# Patient Record
Sex: FEMALE | Race: ASIAN | NOT HISPANIC OR LATINO | Employment: OTHER | URBAN - METROPOLITAN AREA
[De-identification: names, ages, dates, MRNs, and addresses within clinical notes are randomized per-mention and may not be internally consistent; named-entity substitution may affect disease eponyms.]

---

## 2017-05-15 DIAGNOSIS — E78.5 HYPERLIPIDEMIA LDL GOAL <130: ICD-10-CM

## 2017-05-15 NOTE — TELEPHONE ENCOUNTER
lovastatin (MEVACOR) 40 MG tablet     Last Written Prescription Date: 05/11/16  Last Fill Quantity: 90, # refills: 4  Last Office Visit with G, [facility] or [facility] prescribing provider: 05/25/16       Lab Results   Component Value Date    CHOL 216 08/28/2014     Lab Results   Component Value Date    HDL 49 08/28/2014     Lab Results   Component Value Date     05/11/2016     08/28/2014     Lab Results   Component Value Date    TRIG 173 08/28/2014     Lab Results   Component Value Date    CHOLHDLRATIO 4.4 08/28/2014         [facility]

## 2017-05-18 RX ORDER — LOVASTATIN 40 MG
40 TABLET ORAL AT BEDTIME
Qty: 90 TABLET | Refills: 0 | Status: SHIPPED | OUTPATIENT
Start: 2017-05-18 | End: 2018-09-12

## 2017-05-18 NOTE — TELEPHONE ENCOUNTER
Routing refill request to provider for review/approval because:  Labs not current:  Cholesterol labs are from 2014  Lian Hensley RN

## 2017-08-30 ENCOUNTER — COMMUNICATION - HEALTHEAST (OUTPATIENT)
Dept: UROLOGY | Facility: CLINIC | Age: 67
End: 2017-08-30

## 2017-08-30 ENCOUNTER — TRANSFERRED RECORDS (OUTPATIENT)
Dept: HEALTH INFORMATION MANAGEMENT | Facility: CLINIC | Age: 67
End: 2017-08-30

## 2017-08-30 LAB
CREAT SERPL-MCNC: 0.83 MG/DL (ref 0.6–1.1)
GFR SERPL CREATININE-BSD FRML MDRD: >60 ML/MIN/1.73M2
GLUCOSE SERPL-MCNC: 181 MG/DL (ref 70–125)
POTASSIUM SERPL-SCNC: 3.6 MMOL/L (ref 3.5–5)

## 2017-09-05 ENCOUNTER — COMMUNICATION - HEALTHEAST (OUTPATIENT)
Dept: UROLOGY | Facility: CLINIC | Age: 67
End: 2017-09-05

## 2017-09-26 ENCOUNTER — TRANSFERRED RECORDS (OUTPATIENT)
Dept: HEALTH INFORMATION MANAGEMENT | Facility: CLINIC | Age: 67
End: 2017-09-26

## 2017-09-27 ENCOUNTER — COMMUNICATION - HEALTHEAST (OUTPATIENT)
Dept: UROLOGY | Facility: CLINIC | Age: 67
End: 2017-09-27

## 2017-09-29 ENCOUNTER — ANESTHESIA - HEALTHEAST (OUTPATIENT)
Dept: SURGERY | Facility: CLINIC | Age: 67
End: 2017-09-29

## 2017-09-29 ENCOUNTER — SURGERY - HEALTHEAST (OUTPATIENT)
Dept: SURGERY | Facility: CLINIC | Age: 67
End: 2017-09-29

## 2017-09-29 ASSESSMENT — MIFFLIN-ST. JEOR: SCORE: 1220.46

## 2017-10-05 ENCOUNTER — AMBULATORY - HEALTHEAST (OUTPATIENT)
Dept: UROLOGY | Facility: CLINIC | Age: 67
End: 2017-10-05

## 2017-10-05 DIAGNOSIS — N20.9 URINARY TRACT STONES: ICD-10-CM

## 2017-10-10 ENCOUNTER — AMBULATORY - HEALTHEAST (OUTPATIENT)
Dept: UROLOGY | Facility: CLINIC | Age: 67
End: 2017-10-10

## 2017-10-10 ENCOUNTER — TRANSFERRED RECORDS (OUTPATIENT)
Dept: HEALTH INFORMATION MANAGEMENT | Facility: CLINIC | Age: 67
End: 2017-10-10

## 2017-10-10 DIAGNOSIS — N20.0 CALCULUS OF KIDNEY: ICD-10-CM

## 2017-10-10 DIAGNOSIS — N20.1 CALCULUS OF URETER: ICD-10-CM

## 2017-11-13 ENCOUNTER — OFFICE VISIT - HEALTHEAST (OUTPATIENT)
Dept: UROLOGY | Facility: CLINIC | Age: 67
End: 2017-11-13

## 2017-11-13 ENCOUNTER — TRANSFERRED RECORDS (OUTPATIENT)
Dept: HEALTH INFORMATION MANAGEMENT | Facility: CLINIC | Age: 67
End: 2017-11-13

## 2017-11-13 ENCOUNTER — AMBULATORY - HEALTHEAST (OUTPATIENT)
Dept: LAB | Facility: CLINIC | Age: 67
End: 2017-11-13

## 2017-11-13 ENCOUNTER — HOSPITAL ENCOUNTER (OUTPATIENT)
Dept: CT IMAGING | Facility: CLINIC | Age: 67
Discharge: HOME | End: 2017-11-13
Attending: UROLOGY

## 2017-11-13 DIAGNOSIS — N20.1 CALCULUS OF URETER: ICD-10-CM

## 2017-11-13 DIAGNOSIS — N20.9 URINARY CALCULUS: ICD-10-CM

## 2017-11-13 DIAGNOSIS — N20.9 URINARY TRACT STONES: ICD-10-CM

## 2017-11-13 DIAGNOSIS — N20.0 CALCULUS OF KIDNEY: ICD-10-CM

## 2017-11-15 ENCOUNTER — AMBULATORY - HEALTHEAST (OUTPATIENT)
Dept: LAB | Facility: CLINIC | Age: 67
End: 2017-11-15

## 2017-11-15 ENCOUNTER — OFFICE VISIT (OUTPATIENT)
Dept: FAMILY MEDICINE | Facility: CLINIC | Age: 67
End: 2017-11-15
Payer: MEDICARE

## 2017-11-15 VITALS
OXYGEN SATURATION: 98 % | BODY MASS INDEX: 36.31 KG/M2 | WEIGHT: 173 LBS | SYSTOLIC BLOOD PRESSURE: 136 MMHG | TEMPERATURE: 97.7 F | HEART RATE: 78 BPM | HEIGHT: 58 IN | DIASTOLIC BLOOD PRESSURE: 72 MMHG

## 2017-11-15 DIAGNOSIS — Z78.0 ASYMPTOMATIC POSTMENOPAUSAL STATUS: ICD-10-CM

## 2017-11-15 DIAGNOSIS — Z23 NEED FOR PROPHYLACTIC VACCINATION AGAINST STREPTOCOCCUS PNEUMONIAE (PNEUMOCOCCUS): ICD-10-CM

## 2017-11-15 DIAGNOSIS — Z12.31 VISIT FOR SCREENING MAMMOGRAM: ICD-10-CM

## 2017-11-15 DIAGNOSIS — I10 HYPERTENSION GOAL BP (BLOOD PRESSURE) < 140/90: ICD-10-CM

## 2017-11-15 DIAGNOSIS — N20.0 CALCULUS OF KIDNEY: Primary | ICD-10-CM

## 2017-11-15 DIAGNOSIS — Z91.81 AT RISK FOR FALLING: ICD-10-CM

## 2017-11-15 DIAGNOSIS — R20.2 NUMBNESS AND TINGLING OF HAND: ICD-10-CM

## 2017-11-15 DIAGNOSIS — N20.1 CALCULUS OF URETER: ICD-10-CM

## 2017-11-15 DIAGNOSIS — R20.0 NUMBNESS AND TINGLING OF HAND: ICD-10-CM

## 2017-11-15 DIAGNOSIS — E66.01 MORBID OBESITY DUE TO EXCESS CALORIES (H): ICD-10-CM

## 2017-11-15 LAB
ALBUMIN SERPL-MCNC: 3.3 G/DL (ref 3.4–5)
ALP SERPL-CCNC: 94 U/L (ref 40–150)
ALT SERPL W P-5'-P-CCNC: 29 U/L (ref 0–50)
ANION GAP SERPL CALCULATED.3IONS-SCNC: 9 MMOL/L (ref 3–14)
AST SERPL W P-5'-P-CCNC: 20 U/L (ref 0–45)
BILIRUB SERPL-MCNC: 0.3 MG/DL (ref 0.2–1.3)
BUN SERPL-MCNC: 16 MG/DL (ref 7–30)
CALCIUM SERPL-MCNC: 8.4 MG/DL (ref 8.5–10.1)
CHLORIDE SERPL-SCNC: 107 MMOL/L (ref 94–109)
CHOLEST SERPL-MCNC: 195 MG/DL
CO2 SERPL-SCNC: 24 MMOL/L (ref 20–32)
CREAT SERPL-MCNC: 0.87 MG/DL (ref 0.52–1.04)
GFR SERPL CREATININE-BSD FRML MDRD: 65 ML/MIN/1.7M2
GLUCOSE SERPL-MCNC: 148 MG/DL (ref 70–99)
HDLC SERPL-MCNC: 37 MG/DL
LDLC SERPL CALC-MCNC: 83 MG/DL
NONHDLC SERPL-MCNC: 158 MG/DL
POTASSIUM SERPL-SCNC: 3.8 MMOL/L (ref 3.4–5.3)
PROT SERPL-MCNC: 7.1 G/DL (ref 6.8–8.8)
SODIUM SERPL-SCNC: 140 MMOL/L (ref 133–144)
TRIGL SERPL-MCNC: 376 MG/DL
VIT B12 SERPL-MCNC: 420 PG/ML (ref 193–986)

## 2017-11-15 PROCEDURE — 80053 COMPREHEN METABOLIC PANEL: CPT | Performed by: FAMILY MEDICINE

## 2017-11-15 PROCEDURE — 80061 LIPID PANEL: CPT | Performed by: FAMILY MEDICINE

## 2017-11-15 PROCEDURE — 82607 VITAMIN B-12: CPT | Performed by: FAMILY MEDICINE

## 2017-11-15 PROCEDURE — 90670 PCV13 VACCINE IM: CPT | Performed by: FAMILY MEDICINE

## 2017-11-15 PROCEDURE — 99214 OFFICE O/P EST MOD 30 MIN: CPT | Mod: 25 | Performed by: FAMILY MEDICINE

## 2017-11-15 PROCEDURE — 36415 COLL VENOUS BLD VENIPUNCTURE: CPT | Performed by: FAMILY MEDICINE

## 2017-11-15 PROCEDURE — G0009 ADMIN PNEUMOCOCCAL VACCINE: HCPCS | Performed by: FAMILY MEDICINE

## 2017-11-15 RX ORDER — OXYCODONE AND ACETAMINOPHEN 5; 325 MG/1; MG/1
TABLET ORAL
COMMUNITY
Start: 2017-08-30 | End: 2018-09-12

## 2017-11-15 RX ORDER — LOSARTAN POTASSIUM 25 MG/1
25 TABLET ORAL DAILY
Qty: 90 TABLET | Refills: 1 | Status: SHIPPED | OUTPATIENT
Start: 2017-11-15 | End: 2018-09-12

## 2017-11-15 RX ORDER — LISINOPRIL 10 MG/1
10 TABLET ORAL DAILY
COMMUNITY
Start: 2017-09-28 | End: 2017-11-15 | Stop reason: SINTOL

## 2017-11-15 RX ORDER — LEVOFLOXACIN 750 MG/1
TABLET, FILM COATED ORAL
COMMUNITY
Start: 2017-09-28 | End: 2017-11-15

## 2017-11-15 RX ORDER — TAMSULOSIN HYDROCHLORIDE 0.4 MG/1
CAPSULE ORAL
COMMUNITY
Start: 2017-09-28 | End: 2018-09-12

## 2017-11-15 RX ORDER — FUROSEMIDE 20 MG
20 TABLET ORAL DAILY
COMMUNITY
Start: 2017-09-28 | End: 2018-09-12

## 2017-11-15 RX ORDER — OXYCODONE HYDROCHLORIDE 5 MG/1
TABLET ORAL
COMMUNITY
Start: 2017-09-28 | End: 2018-09-12

## 2017-11-15 ASSESSMENT — ANXIETY QUESTIONNAIRES
1. FEELING NERVOUS, ANXIOUS, OR ON EDGE: NOT AT ALL
2. NOT BEING ABLE TO STOP OR CONTROL WORRYING: NOT AT ALL
7. FEELING AFRAID AS IF SOMETHING AWFUL MIGHT HAPPEN: NOT AT ALL
6. BECOMING EASILY ANNOYED OR IRRITABLE: NOT AT ALL
GAD7 TOTAL SCORE: 0
IF YOU CHECKED OFF ANY PROBLEMS ON THIS QUESTIONNAIRE, HOW DIFFICULT HAVE THESE PROBLEMS MADE IT FOR YOU TO DO YOUR WORK, TAKE CARE OF THINGS AT HOME, OR GET ALONG WITH OTHER PEOPLE: NOT DIFFICULT AT ALL
3. WORRYING TOO MUCH ABOUT DIFFERENT THINGS: NOT AT ALL
5. BEING SO RESTLESS THAT IT IS HARD TO SIT STILL: NOT AT ALL

## 2017-11-15 ASSESSMENT — PATIENT HEALTH QUESTIONNAIRE - PHQ9
SUM OF ALL RESPONSES TO PHQ QUESTIONS 1-9: 1
5. POOR APPETITE OR OVEREATING: NOT AT ALL

## 2017-11-15 NOTE — PATIENT INSTRUCTIONS
Based on your medical history and these are the current health maintenance or preventive care services that you are due for (some may have been done at this visit)  Health Maintenance Due   Topic Date Due     PHQ-9 Q6 MONTHS  11/25/2016     FALL RISK ASSESSMENT  04/06/2017     DEXA SCAN SCREENING (SYSTEM ASSIGNED)  04/06/2017     PNEUMOCOCCAL (1 of 2 - PCV13) 04/06/2017     LIPID MONITORING Q1 YEAR  05/11/2017     DEPRESSION ACTION PLAN Q1 YR  05/11/2017     FIT Q1 YR  05/25/2017     MAMMO SCREEN Q2 YR (SYSTEM ASSIGNED)  07/22/2017     INFLUENZA VACCINE (SYSTEM ASSIGNED)  09/01/2017         At [facility], we strive to deliver an exceptional experience to you, every time we see you.    If you receive a survey in the mail, please send us back your thoughts. We really do value your feedback.    Your care team's suggested websites for health information:  Www.[employer] : Up to date and easily searchable information on multiple topics.  Www.medlineplus.gov : medication info, interactive tutorials, watch real surgeries online  Www.familydoctor.org : good info from the Academy of Family Physicians  Www.cdc.gov : public health info, travel advisories, epidemics (H1N1)  Www.aap.org : children's health info, normal development, vaccinations  Www.health.[facility].[place].us : [place] dept of health, public health issues in [place], N1N1    How to contact your care team:   Team [facility] [phone]         Pharmacy [phone]    Dr. Dickinson, Mariluz Geronimo PA-C, Dr. Woodall, Chelsea TERESA CNP, Bria Lange PA-C, Dr. Bell, and BRII Bangura CNP    Team RN: Lyric      Clinic hours  M-Th 7 am-7 pm   Fri 7 am-5 pm.   Urgent care M-F 11 am-9 pm,   Sat/Sun 9 am-5 pm.  Pharmacy M-Th 8 am-8 pm Fri 8 am-6 pm  Sat/Sun 9 am-5 pm.     All password changes, disabled accounts, or ID changes in [employer]MyHealth will be done by our Access Services Department.    If you need help with your account or  sofi, call: [phone]. Clinic staff no longer has the ability to change passwords.

## 2017-11-15 NOTE — NURSING NOTE
"Chief Complaint   Patient presents with     [facility] - 9/26/2017 to 9/28/2017 - Returning to hospital on 11/13/2017 = Calculus of ureter (Primary Dx);Urinary tract stones;Urinary calculus       Initial /72 (BP Location: Right arm, Patient Position: Chair, Cuff Size: Adult Large)  Pulse 78  Temp 97.7  F (36.5  C) (Oral)  Ht 4' 10\" (1.473 m)  Wt 173 lb (78.5 kg)  SpO2 98%  Breastfeeding? No  BMI 36.16 kg/m2 Estimated body mass index is 36.16 kg/(m^2) as calculated from the following:    Height as of this encounter: 4' 10\" (1.473 m).    Weight as of this encounter: 173 lb (78.5 kg).  Medication Reconciliation: complete   An,CMA (AMAA)      "

## 2017-11-15 NOTE — LETTER
November 16, 2017      Amalia Harris  [address] [facility] [place] [address]            Ms. Harris,    Your LDL (bad cholesterol)  was at goal. Your HDL (good cholesterol) was below my goal for you (>45 in men and > 55 in women).  Genetics and inactivity can contribute to this. Your triglycerides were above normal.  Poor diet, genetics and being overweight can contribute to this.  1000mg daily of omega-3 fatty acids may improve this. You need to recheck fasting labs yearly.  Your liver function tests are normal.  Your blood sugar was elevated but this is not unexpected given you were not fasting. Maintaining a healthy weight is benificial to good blood sugar control.  Your kidney function was normal.  Your vitamin B12 level was normal.    Please contact the clinic if you have additional questions.  Thank you.    Sincerely,    Lien Link/ak        Results for orders placed or performed in visit on 11/15/17   Lipid panel reflex to direct LDL Non-fasting   Result Value Ref Range    Cholesterol 195 <200 mg/dL    Triglycerides 376 (H) <150 mg/dL    HDL Cholesterol 37 (L) >49 mg/dL    LDL Cholesterol Calculated 83 <100 mg/dL    Non HDL Cholesterol 158 (H) <130 mg/dL   Comprehensive metabolic panel   Result Value Ref Range    Sodium 140 133 - 144 mmol/L    Potassium 3.8 3.4 - 5.3 mmol/L    Chloride 107 94 - 109 mmol/L    Carbon Dioxide 24 20 - 32 mmol/L    Anion Gap 9 3 - 14 mmol/L    Glucose 148 (H) 70 - 99 mg/dL    Urea Nitrogen 16 7 - 30 mg/dL    Creatinine 0.87 0.52 - 1.04 mg/dL    GFR Estimate 65 >60 mL/min/1.7m2    GFR Estimate If Black 79 >60 mL/min/1.7m2    Calcium 8.4 (L) 8.5 - 10.1 mg/dL    Bilirubin Total 0.3 0.2 - 1.3 mg/dL    Albumin 3.3 (L) 3.4 - 5.0 g/dL    Protein Total 7.1 6.8 - 8.8 g/dL    Alkaline Phosphatase 94 40 - 150 U/L    ALT 29 0 - 50 U/L    AST 20 0 - 45 U/L   Vitamin B12   Result Value Ref Range    Vitamin B12 420 193 - 986 pg/mL

## 2017-11-15 NOTE — PROGRESS NOTES
SUBJECTIVE:   Amalia Harris is a 67 year old female who presents to clinic today for the following health issues:          Hospital Follow-up Visit:    Hospital/Nursing Home/IP Rehab Facility: [facility]  Date of Admission: 9/26/17  Date of Discharge: 9/28/2017  Reason(s) for Admission:    Diagnoses    Hydronephrosis    Renal colic on right side    Obstruction of left ureteropelvic junction (UPJ) due to stone                  Problems taking medications regularly:  None       Medication changes since discharge: None       Problems adhering to non-medication therapy:  None    Summary of hospitalization:  [facility] discharge summary reviewed  Diagnostic Tests/Treatments reviewed.  Follow up needed: none  Other Healthcare Providers Involved in Patient s Care:         None  Update since discharge: improved.     Post Discharge Medication Reconciliation: discharge medications reconciled and changed, per note/orders (see AVS).  Plan of care communicated with patient     Coding guidelines for this visit:  Type of Medical   Decision Making Face-to-Face Visit       within 7 Days of discharge Face-to-Face Visit        within 14 days of discharge   Moderate Complexity [id number]   High Complexity [id number]          Left stone passed with flomax but right stone required lithotripsy. Has one still present and should pass on its own with flomax.    HTN - taking lisinopril but causing dry cough that is disrupting day.  Requesting change.    Tingling of bilateral hands for months.  Worse at night and better when shaking hands.  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          Problem list and histories reviewed & adjusted, as indicated.  Additional history: as documented    Patient Active Problem List   Diagnosis     Hyperlipidemia LDL goal <130     Major depression     Osteoarthritis     Advance care planning     Intertriginous candidiasis     Morbid obesity considered morbid due to HTN (H)      "Prediabetes     Guaiac positive stools     Past Surgical History:   Procedure Laterality Date     GALLBLADDER SURGERY      was done in 86       Social History   Substance Use Topics     Smoking status: Never Smoker     Smokeless tobacco: Never Used     Alcohol use No     Family History   Problem Relation Age of Onset     Respiratory Father      CANCER Brother              Reviewed and updated as needed this visit by clinical staffTobacco  Allergies  Meds  Problems  Med Hx  Surg Hx  Fam Hx  Soc Hx        Reviewed and updated as needed this visit by Provider  Allergies  Meds  Problems         ROS:  Constitutional, HEENT, cardiovascular, pulmonary, gi and gu systems are negative, except as otherwise noted.      OBJECTIVE:   /72 (BP Location: Right arm, Patient Position: Chair, Cuff Size: Adult Large)  Pulse 78  Temp 97.7  F (36.5  C) (Oral)  Ht 4' 10\" (1.473 m)  Wt 173 lb (78.5 kg)  SpO2 98%  Breastfeeding? No  BMI 36.16 kg/m2  Body mass index is 36.16 kg/(m^2).  GENERAL: healthy, alert and no distress  NECK: no adenopathy, no asymmetry, masses, or scars and thyroid normal to palpation  RESP: lungs clear to auscultation - no rales, rhonchi or wheezes  CV: regular rate and rhythm, normal S1 S2, no S3 or S4, no murmur, click or rub, no peripheral edema and peripheral pulses strong  ABDOMEN: soft, nontender, no hepatosplenomegaly, no masses and bowel sounds normal, no flank pain  MS: no gross musculoskeletal defects noted, no edema  NEURO: tingling when tapping cubital tendon on LEFT  PSYCH: mentation appears normal, affect normal/bright    Diagnostic Test Results:  none     ASSESSMENT/PLAN:     1. Calculus of kidney  Continued stone - check labs and continue flomax until last stone has passed  - Comprehensive metabolic panel    2. Hypertension goal BP (blood pressure) < 140/90  Medication side effects - stop lisinopril and start losartan. Check labs  - Lipid panel reflex to direct LDL " Non-fasting  - losartan (COZAAR) 25 MG tablet; Take 1 tablet (25 mg) by mouth daily  Dispense: 90 tablet; Refill: 1  - Comprehensive metabolic panel    3. Numbness and tingling of hand  Suspect carpal tunnel - check lab and refer for EMG  - NEUROLOGY ADULT REFERRAL  - Vitamin B12    4. Morbid obesity considered morbid due to HTN (H)  Low carb eating recommended    5. Need for prophylactic vaccination against Streptococcus pneumoniae (pneumococcus)    - PNEUMOCOCCAL CONJ VACCINE 13 VALENT IM  - ADMIN 1st VACCINE    6. At risk for falling  MA assessment done    7. Visit for screening mammogram    - MA SCREENING DIGITAL BILAT - Future  (s+30); Future    8. Asymptomatic postmenopausal status    - DEXA HIP/PELVIS/SPINE - Future; Future    The uses and side effects, including black box warnings as appropriate, were discussed in detail.  All patient questions were answered.  The patient was instructed to call immediately if any side effects developed.     FUTURE APPOINTMENTS:       - Follow-up visit in 1 month.    Lien Link MD  [facility]

## 2017-11-15 NOTE — NURSING NOTE
Screening Questionnaire for Adult Immunization    Are you sick today?   No   Do you have allergies to medications, food, a vaccine component or latex?   Yes   Have you ever had a serious reaction after receiving a vaccination?   No   Do you have a long-term health problem with heart disease, lung disease, asthma, kidney disease, metabolic disease (e.g. diabetes), anemia, or other blood disorder?   No   Do you have cancer, leukemia, HIV/AIDS, or any other immune system problem?   No   In the past 3 months, have you taken medications that affect  your immune system, such as prednisone, other steroids, or anticancer drugs; drugs for the treatment of rheumatoid arthritis, Crohn s disease, or psoriasis; or have you had radiation treatments?   No   Have you had a seizure, or a brain or other nervous system problem?   No   During the past year, have you received a transfusion of blood or blood     products, or been given immune (gamma) globulin or antiviral drug?   No   For women: Are you pregnant or is there a chance you could become        pregnant during the next month?   No   Have you received any vaccinations in the past 4 weeks?   No     Immunization questionnaire answers were all negative.        Per orders of Dr. Angela Link, injection of PCV 13 given by Orin Scruggs. Patient instructed to remain in clinic for 15 minutes afterwards, and to report any adverse reaction to me immediately.       Screening performed by Orin Scruggs on 11/15/2017 at 4:02 PM.

## 2017-11-16 ASSESSMENT — ANXIETY QUESTIONNAIRES: GAD7 TOTAL SCORE: 0

## 2017-11-16 NOTE — PROGRESS NOTES
Ms. Harris,    Your LDL (bad cholesterol)  was at goal. Your HDL (good cholesterol) was below my goal for you (>45 in men and > 55 in women).  Genetics and inactivity can contribute to this. Your triglycerides were above normal.  Poor diet, genetics and being overweight can contribute to this.  1000mg daily of omega-3 fatty acids may improve this. You need to recheck fasting labs yearly.  Your liver function tests are normal.  Your blood sugar was elevated but this is not unexpected given you were not fasting. Maintaining a healthy weight is benificial to good blood sugar control.  Your kidney function was normal.  Your vitamin B12 level was normal.    Please contact the clinic if you have additional questions.  Thank you.    Sincerely,    Lien Link

## 2017-11-22 ENCOUNTER — COMMUNICATION - HEALTHEAST (OUTPATIENT)
Dept: UROLOGY | Facility: CLINIC | Age: 67
End: 2017-11-22

## 2017-11-22 ENCOUNTER — AMBULATORY - HEALTHEAST (OUTPATIENT)
Dept: LAB | Facility: CLINIC | Age: 67
End: 2017-11-22

## 2017-11-22 DIAGNOSIS — N20.9 URINARY TRACT STONES: ICD-10-CM

## 2017-12-14 ENCOUNTER — HOSPITAL ENCOUNTER (OUTPATIENT)
Dept: CT IMAGING | Facility: CLINIC | Age: 67
Discharge: HOME | End: 2017-12-14
Attending: UROLOGY

## 2017-12-14 ENCOUNTER — OFFICE VISIT - HEALTHEAST (OUTPATIENT)
Dept: UROLOGY | Facility: CLINIC | Age: 67
End: 2017-12-14

## 2017-12-14 ENCOUNTER — TRANSFERRED RECORDS (OUTPATIENT)
Dept: HEALTH INFORMATION MANAGEMENT | Facility: CLINIC | Age: 67
End: 2017-12-14

## 2017-12-14 DIAGNOSIS — N20.1 CALCULUS OF URETER: ICD-10-CM

## 2017-12-14 DIAGNOSIS — R34 LOW URINE OUTPUT: ICD-10-CM

## 2017-12-14 DIAGNOSIS — N20.9 URINARY TRACT STONES: ICD-10-CM

## 2017-12-14 DIAGNOSIS — N20.0 URIC ACID NEPHROLITHIASIS: ICD-10-CM

## 2017-12-14 DIAGNOSIS — R82.991 HYPOCITRATURIA: ICD-10-CM

## 2018-09-12 ENCOUNTER — OFFICE VISIT (OUTPATIENT)
Dept: FAMILY MEDICINE | Facility: CLINIC | Age: 68
End: 2018-09-12
Payer: MEDICARE

## 2018-09-12 VITALS
OXYGEN SATURATION: 98 % | BODY MASS INDEX: 38.12 KG/M2 | HEIGHT: 58 IN | HEART RATE: 88 BPM | WEIGHT: 181.6 LBS | DIASTOLIC BLOOD PRESSURE: 80 MMHG | SYSTOLIC BLOOD PRESSURE: 116 MMHG | TEMPERATURE: 98 F

## 2018-09-12 DIAGNOSIS — F32.2 SEVERE SINGLE CURRENT EPISODE OF MAJOR DEPRESSIVE DISORDER, WITHOUT PSYCHOTIC FEATURES (H): ICD-10-CM

## 2018-09-12 DIAGNOSIS — I10 HYPERTENSION GOAL BP (BLOOD PRESSURE) < 140/90: ICD-10-CM

## 2018-09-12 DIAGNOSIS — Z11.1 SCREENING EXAMINATION FOR PULMONARY TUBERCULOSIS: ICD-10-CM

## 2018-09-12 DIAGNOSIS — Z23 NEED FOR PROPHYLACTIC VACCINATION AND INOCULATION AGAINST INFLUENZA: ICD-10-CM

## 2018-09-12 DIAGNOSIS — E78.5 HYPERLIPIDEMIA LDL GOAL <130: ICD-10-CM

## 2018-09-12 DIAGNOSIS — R07.9 CHEST PAIN, UNSPECIFIED TYPE: Primary | ICD-10-CM

## 2018-09-12 DIAGNOSIS — E66.01 MORBID OBESITY DUE TO EXCESS CALORIES (H): ICD-10-CM

## 2018-09-12 DIAGNOSIS — R01.1 HEART MURMUR: ICD-10-CM

## 2018-09-12 DIAGNOSIS — Z12.11 SCREEN FOR COLON CANCER: ICD-10-CM

## 2018-09-12 LAB
ALBUMIN SERPL-MCNC: 3.2 G/DL (ref 3.4–5)
ALP SERPL-CCNC: 78 U/L (ref 40–150)
ALT SERPL W P-5'-P-CCNC: 26 U/L (ref 0–50)
ANION GAP SERPL CALCULATED.3IONS-SCNC: 8 MMOL/L (ref 3–14)
AST SERPL W P-5'-P-CCNC: 16 U/L (ref 0–45)
BILIRUB SERPL-MCNC: 0.3 MG/DL (ref 0.2–1.3)
BUN SERPL-MCNC: 25 MG/DL (ref 7–30)
CALCIUM SERPL-MCNC: 9.1 MG/DL (ref 8.5–10.1)
CHLORIDE SERPL-SCNC: 105 MMOL/L (ref 94–109)
CHOLEST SERPL-MCNC: 250 MG/DL
CO2 SERPL-SCNC: 28 MMOL/L (ref 20–32)
CREAT SERPL-MCNC: 0.85 MG/DL (ref 0.52–1.04)
ERYTHROCYTE [DISTWIDTH] IN BLOOD BY AUTOMATED COUNT: 13.2 % (ref 10–15)
GFR SERPL CREATININE-BSD FRML MDRD: 67 ML/MIN/1.7M2
GLUCOSE SERPL-MCNC: 118 MG/DL (ref 70–99)
HCT VFR BLD AUTO: 38.1 % (ref 35–47)
HDLC SERPL-MCNC: 65 MG/DL
HGB BLD-MCNC: 12.3 G/DL (ref 11.7–15.7)
LDLC SERPL CALC-MCNC: 128 MG/DL
MCH RBC QN AUTO: 29.9 PG (ref 26.5–33)
MCHC RBC AUTO-ENTMCNC: 32.3 G/DL (ref 31.5–36.5)
MCV RBC AUTO: 93 FL (ref 78–100)
NONHDLC SERPL-MCNC: 185 MG/DL
PLATELET # BLD AUTO: 354 10E9/L (ref 150–450)
POTASSIUM SERPL-SCNC: 4.3 MMOL/L (ref 3.4–5.3)
PROT SERPL-MCNC: 7.7 G/DL (ref 6.8–8.8)
RBC # BLD AUTO: 4.11 10E12/L (ref 3.8–5.2)
SODIUM SERPL-SCNC: 141 MMOL/L (ref 133–144)
TRIGL SERPL-MCNC: 286 MG/DL
TSH SERPL DL<=0.005 MIU/L-ACNC: 2.09 MU/L (ref 0.4–4)
WBC # BLD AUTO: 14 10E9/L (ref 4–11)

## 2018-09-12 PROCEDURE — G0008 ADMIN INFLUENZA VIRUS VAC: HCPCS | Performed by: NURSE PRACTITIONER

## 2018-09-12 PROCEDURE — 80053 COMPREHEN METABOLIC PANEL: CPT | Performed by: NURSE PRACTITIONER

## 2018-09-12 PROCEDURE — 90662 IIV NO PRSV INCREASED AG IM: CPT | Performed by: NURSE PRACTITIONER

## 2018-09-12 PROCEDURE — 85027 COMPLETE CBC AUTOMATED: CPT | Performed by: NURSE PRACTITIONER

## 2018-09-12 PROCEDURE — 86480 TB TEST CELL IMMUN MEASURE: CPT | Performed by: NURSE PRACTITIONER

## 2018-09-12 PROCEDURE — 82043 UR ALBUMIN QUANTITATIVE: CPT | Performed by: NURSE PRACTITIONER

## 2018-09-12 PROCEDURE — 80061 LIPID PANEL: CPT | Performed by: NURSE PRACTITIONER

## 2018-09-12 PROCEDURE — 93000 ELECTROCARDIOGRAM COMPLETE: CPT | Performed by: NURSE PRACTITIONER

## 2018-09-12 PROCEDURE — 99214 OFFICE O/P EST MOD 30 MIN: CPT | Mod: 25 | Performed by: NURSE PRACTITIONER

## 2018-09-12 PROCEDURE — 36415 COLL VENOUS BLD VENIPUNCTURE: CPT | Performed by: NURSE PRACTITIONER

## 2018-09-12 PROCEDURE — 84443 ASSAY THYROID STIM HORMONE: CPT | Performed by: NURSE PRACTITIONER

## 2018-09-12 RX ORDER — LOVASTATIN 40 MG
40 TABLET ORAL AT BEDTIME
Qty: 90 TABLET | Refills: 1 | Status: SHIPPED | OUTPATIENT
Start: 2018-09-12 | End: 2019-09-05

## 2018-09-12 ASSESSMENT — ANXIETY QUESTIONNAIRES
5. BEING SO RESTLESS THAT IT IS HARD TO SIT STILL: NEARLY EVERY DAY
7. FEELING AFRAID AS IF SOMETHING AWFUL MIGHT HAPPEN: NOT AT ALL
2. NOT BEING ABLE TO STOP OR CONTROL WORRYING: NOT AT ALL
IF YOU CHECKED OFF ANY PROBLEMS ON THIS QUESTIONNAIRE, HOW DIFFICULT HAVE THESE PROBLEMS MADE IT FOR YOU TO DO YOUR WORK, TAKE CARE OF THINGS AT HOME, OR GET ALONG WITH OTHER PEOPLE: SOMEWHAT DIFFICULT
6. BECOMING EASILY ANNOYED OR IRRITABLE: NEARLY EVERY DAY
1. FEELING NERVOUS, ANXIOUS, OR ON EDGE: NEARLY EVERY DAY
3. WORRYING TOO MUCH ABOUT DIFFERENT THINGS: NEARLY EVERY DAY
GAD7 TOTAL SCORE: 12

## 2018-09-12 ASSESSMENT — PATIENT HEALTH QUESTIONNAIRE - PHQ9: 5. POOR APPETITE OR OVEREATING: NOT AT ALL

## 2018-09-12 NOTE — PROGRESS NOTES
SUBJECTIVE:   Amalia Harris is a 68 year old female who presents to clinic today for the following health issues:    Hyperlipidemia Follow-Up      Rate your low fat/cholesterol diet?: good    Taking statin?  Yes, no muscle aches from statin    Other lipid medications/supplements?:  none      Amount of exercise or physical activity: 4-5 days/week for an average of greater than 60 minutes    Problems taking medications regularly: No    Medication side effects: weight gain    Diet: low fat/cholesterol    Possible medication to help lose weight.          Chest Pain      Onset: 2 weeks    Description (location/character/radiation/duration): cones and goes, lasting for a few hours at a time, not related to meals, sometimes is worse with activity (climbing stairs), no shortness of breath but she notes increased HR, no diaphoresis, weakness, dizziness, nausea or voniting.     Intensity:  moderate    Accompanying signs and symptoms:        Shortness of breath: no        Sweating: no        Nausea/vomitting: no        Palpitations: YES- when walking quickly       Other (fevers/chills/cough/heartburn/lightheadedness): no     History (similar episodes/previous evaluation): None    Precipitating or alleviating factors:       Worse with exertion: YES- especially when carrying heavy items       Worse with breathing: no        Related to eating: no        Better with burping: no     Therapies tried and outcome: Warm water/green tea helps    Patient also requests TB screen.  She went camping with a friend who has TB..    No unexplained hoarseness  No loss of appetite  No unexplained weight loss  No productive or prolonged cough  No bloody sputum  Np persistent fever over 100 F  No night sweats  No hemoptysis  No shortness of breath  No unexplained fatigue or weakness  No recurrent pneumonia      Problem list and histories reviewed & adjusted, as indicated.  Additional history: as documented    Patient Active Problem List   Diagnosis  "    Hyperlipidemia LDL goal <130     Major depression     Osteoarthritis     Advance care planning     Intertriginous candidiasis     Morbid obesity considered morbid due to HTN (H)     Prediabetes     Guaiac positive stools     Hypertension goal BP (blood pressure) < 140/90     Past Surgical History:   Procedure Laterality Date     GALLBLADDER SURGERY      was done in 86       Social History   Substance Use Topics     Smoking status: Never Smoker     Smokeless tobacco: Never Used     Alcohol use No     Family History   Problem Relation Age of Onset     Respiratory Father      Cancer Brother          Current Outpatient Prescriptions   Medication Sig Dispense Refill     lovastatin (MEVACOR) 40 MG tablet Take 1 tablet (40 mg) by mouth At Bedtime Needs to be seen for more. 90 tablet 0     BP Readings from Last 3 Encounters:   09/12/18 116/80   11/15/17 136/72   05/25/16 126/82    Wt Readings from Last 3 Encounters:   09/12/18 181 lb 9.6 oz (82.4 kg)   11/15/17 173 lb (78.5 kg)   05/25/16 176 lb 9.6 oz (80.1 kg)                    Reviewed and updated as needed this visit by clinical staff  Tobacco  Allergies  Meds  Med Hx  Surg Hx  Fam Hx  Soc Hx      Reviewed and updated as needed this visit by Provider         ROS:  Constitutional, HEENT, cardiovascular, pulmonary, gi and gu systems are negative, except as otherwise noted.    OBJECTIVE:     /80 (BP Location: Left arm, Patient Position: Chair, Cuff Size: Adult Large)  Pulse 88  Temp 98  F (36.7  C) (Oral)  Ht 4' 10\" (1.473 m)  Wt 181 lb 9.6 oz (82.4 kg)  SpO2 98%  BMI 37.95 kg/m2  Body mass index is 37.95 kg/(m^2).  GENERAL: healthy, alert and no distress  EYES: Eyes grossly normal to inspection, PERRL and conjunctivae and sclerae normal  HENT: ear canals and TM's normal, nose and mouth without ulcers or lesions  NECK: no adenopathy, no asymmetry, masses, or scars and thyroid normal to palpation  RESP: lungs clear to auscultation - no rales, rhonchi " "or wheezes  CV: regular rate and rhythm, normal S1 S2, no S3 or S4, Gr 2 DHIRAJ heard best at LUSB, no click or rub, no peripheral edema and peripheral pulses strong  ABDOMEN: soft, nontender, no hepatosplenomegaly, no masses and bowel sounds normal  MS: no gross musculoskeletal defects noted, no edema  SKIN: no suspicious lesions or rashes  NEURO: Normal strength and tone, mentation intact and speech normal  BACK: no CVA tenderness, no paralumbar tenderness  PSYCH: mentation appears normal, affect normal/bright  LYMPH: normal ant/post cervical, supraclavicular nodes    Diagnostic Test Results:  Results for orders placed or performed in visit on 09/12/18 (from the past 24 hour(s))   EKG 12-lead complete w/read - Clinics    Impression    Sinus rhythm, rate 74,  Normal rate, rhythm, intervals, axis, nonspecific ST depression-nondiagnostic, no prior EKG's available for comparison.  Abnormal EKG.  Felicia Ayala  APRN, CNP     CBC with platelets   Result Value Ref Range    WBC 14.0 (H) 4.0 - 11.0 10e9/L    RBC Count 4.11 3.8 - 5.2 10e12/L    Hemoglobin 12.3 11.7 - 15.7 g/dL    Hematocrit 38.1 35.0 - 47.0 %    MCV 93 78 - 100 fl    MCH 29.9 26.5 - 33.0 pg    MCHC 32.3 31.5 - 36.5 g/dL    RDW 13.2 10.0 - 15.0 %    Platelet Count 354 150 - 450 10e9/L       ASSESSMENT/PLAN:         BMI:   Estimated body mass index is 37.95 kg/(m^2) as calculated from the following:    Height as of this encounter: 4' 10\" (1.473 m).    Weight as of this encounter: 181 lb 9.6 oz (82.4 kg).   Weight management plan: Discussed healthy diet and exercise guidelines and patient will follow up in 6 months in clinic to re-evaluate.      1. Chest pain, unspecified type  Patient is asymptomatic presently, EKG with nonspecifid ST depression.  Getting chest film given history of CHF, new onset murmur, referring to Cardiology, reviewed indications for ER visit in detail.  - EKG 12-lead complete w/read - Clinics  - Dobutamine Stress Echocardiogram; " Future  - XR Chest 2 Views; Future  - CARDIOLOGY EVAL ADULT REFERRAL    2. Heart murmur  Getting stress echo, chest film, and referring to Cardiology. Patient to go to the ER for new/worsening chest pain.  - CBC with platelets; Future  - Dobutamine Stress Echocardiogram; Future  - XR Chest 2 Views; Future  - CBC with platelets  - CARDIOLOGY EVAL ADULT REFERRAL    3. Severe single current episode of major depressive disorder, without psychotic features (H)  PHQ-9= 15, ILAN-78=12.  Patient is not on serotonin specific reuptake inhibitor and is not in counseling, declines medication/ referral at this time.     4. Hyperlipidemia LDL goal <130  Patient will come back in 2 weeks for fasting labs, refilled Mevacor. I will call with any abnormal lab results.  Low salt, low chol diet reviewed.  - Lipid Profile (Chol, Trig, HDL, LDL calc); Future  - Comprehensive metabolic panel FUTURE anytime; Future  - Lipid Profile (Chol, Trig, HDL, LDL calc)  - lovastatin (MEVACOR) 40 MG tablet; Take 1 tablet (40 mg) by mouth At Bedtime Needs to be seen for more.  Dispense: 90 tablet; Refill: 1    5. Hypertension goal BP (blood pressure) < 140/90  checking labs, low salt diet advised.  - **Comprehensive metabolic panel FUTURE anytime; Future  - TSH with free T4 reflex; Future  - Albumin Random Urine Quantitative with Creat Ratio; Future  - Albumin Random Urine Quantitative with Creat Ratio  - TSH with free T4 reflex  - **Comprehensive metabolic panel FUTURE anytime    6. Morbid obesity considered morbid due to HTN (H)  Benefits of weight loss reviewed in detail, encouraged her to cut back on the carbohydrates in the diet, consume more fruits and vegetables, drink plenty of water, avoid fruit juices, sodas, get 150 min moderate exercise/week.  Recheck weight in 6 months.    7. Screen for colon cancer  -FIT screening    8. Screening examination for pulmonary tuberculosis    - M Tuberculosis by Quantiferon    9. Need for prophylactic  vaccination and inoculation against influenza    - FLU VACCINE, INCREASED ANTIGEN, PRESV FREE, AGE 65+ [25390]  - Vaccine Administration, Initial [48233]    See Patient Instructions    BRII Barrientos [facility]

## 2018-09-12 NOTE — PATIENT INSTRUCTIONS
At [facility], we strive to deliver an exceptional experience to you, every time we see you.  If you receive a survey in the mail, please send us back your thoughts. We really do value your feedback.    Based on your medical history, these are the current health maintenance/preventive care services that you are due for (some may have been done at this visit.)  Health Maintenance Due   Topic Date Due     FALL RISK ASSESSMENT  04/06/2017     DEXA SCAN SCREENING (SYSTEM ASSIGNED)  04/06/2017     DEPRESSION ACTION PLAN Q1 YR  05/11/2017     FIT Q1 YR  05/25/2017     MAMMO SCREEN Q2 YR (SYSTEM ASSIGNED)  07/22/2017     PHQ-9 Q6 MONTHS  05/15/2018     INFLUENZA VACCINE (1) 09/01/2018       Suggested websites for health information:  Www.Shoes4you.GreenPal : Up to date and easily searchable information on multiple topics.  Www.33Across.gov : medication info, interactive tutorials, watch real surgeries online  Www.familydoctor.org : good info from the Academy of Family Physicians  Www.cdc.gov : public health info, travel advisories, epidemics (H1N1)  Www.aap.org : children's health info, normal development, vaccinations  Www.health.[facility].[place].us : [place] dept of health, public health issues in [place], N1N1    Your care team:                            Family Medicine Internal Medicine   MD Gonzalo Palomino MD Shantel Branch-Fleming, MD Katya Georgiev PA-C Megan Hill, APRN AGUEDA Perrin MD Pediatrics   Isaiah Garcia, PASajanC  Isabel George, MD Chelsea Garcia APRN CNP   MD Kiersten Michele MD Deborah Mielke, MD Kim Thein, APRN [facility]      Clinic hours: Monday - Thursday 7 am-7 pm; Fridays 7 am-5 pm.   Urgent care: Monday - Friday 11 am-9 pm; Saturday and Sunday 9 am-5 pm.  Pharmacy : Monday -Thursday 8 am-8 pm; Friday 8 am-6 pm; Saturday and Sunday 9 am-5 pm.     Clinic: [phone]   Pharmacy: [phone]       *CHEST PAIN, UNCERTAIN CAUSE    Based on your  exam today, the exact cause of your chest pain is not certain. Your condition does not seem serious at this time, and your pain does not appear to be coming from your heart. However, sometimes the signs of a serious problem take more time to appear. Therefore, watch for the warning signs listed below.  HOME CARE:    1. Rest today and avoid strenuous activity.  2. Take any prescribed medicine as directed.  FOLLOW UP with your doctor in 1-3 days.   GET PROMPT MEDICAL ATTENTION if any of the following occur:    A change in the type of pain: if it feels different, becomes more severe, lasts longer, or begins to spread into your shoulder, arm, neck, jaw or back    Shortness of breath or increased pain with breathing    Weakness, dizziness, or fainting    Cough with blood or dark colored sputum (phlegm)    Fever over 101  F (38.3  C)    Swelling, pain or redness in one leg    6812-3123 [employer]. [address] [place] [address]. All rights reserved. This information is not intended as a substitute for professional medical care. Always follow your healthcare professional's instructions.  This information has been modified by your health care provider with permission from the publisher.

## 2018-09-12 NOTE — MR AVS SNAPSHOT
After Visit Summary   9/12/2018    Amalia Harris    MRN: [id number]           Patient Information     Date Of Birth          1950        Visit Information        Provider Department      9/12/2018 3:00 PM Felicia Ayala APRN CNP; [employer] [employer] [facility]        Today's Diagnoses     Chest pain, unspecified type    -  1    Heart murmur        Hyperlipidemia LDL goal <130        Hypertension goal BP (blood pressure) < 140/90        Morbid obesity considered morbid due to HTN (H)        Screen for colon cancer        Major depressive disorder with single episode, in full remission (H)        Influenza vaccine needed          Care Instructions    At [facility], we strive to deliver an exceptional experience to you, every time we see you.  If you receive a survey in the mail, please send us back your thoughts. We really do value your feedback.    Based on your medical history, these are the current health maintenance/preventive care services that you are due for (some may have been done at this visit.)  Health Maintenance Due   Topic Date Due     FALL RISK ASSESSMENT  04/06/2017     DEXA SCAN SCREENING (SYSTEM ASSIGNED)  04/06/2017     DEPRESSION ACTION PLAN Q1 YR  05/11/2017     FIT Q1 YR  05/25/2017     MAMMO SCREEN Q2 YR (SYSTEM ASSIGNED)  07/22/2017     PHQ-9 Q6 MONTHS  05/15/2018     INFLUENZA VACCINE (1) 09/01/2018       Suggested websites for health information:  Www.MacroSolve.org : Up to date and easily searchable information on multiple topics.  Www.medlineplus.gov : medication info, interactive tutorials, watch real surgeries online  Www.familydoctor.org : good info from the Academy of Family Physicians  Www.cdc.gov : public health info, travel advisories, epidemics (H1N1)  Www.aap.org : children's health info, normal development, vaccinations  Www.health.state.[place].us : [place] dept of health, public health issues in [place], N1N1    Your care team:                             Family Medicine Internal Medicine   MD Gonzalo Palomino MD Shantel Branch-Fleming, MD Katya Georgiev PA-C Megan Hill, APRN AGUEDA Perrin MD Pediatrics   Isaiah Garcia PAMERI George, MD Chelsea Garcia APRN CNP   MD Kiersten Michele MD Deborah Mielke, MD Kim Thein, APRN [facility]      Clinic hours: Monday - Thursday 7 am-7 pm; Fridays 7 am-5 pm.   Urgent care: Monday - Friday 11 am-9 pm; Saturday and Sunday 9 am-5 pm.  Pharmacy : Monday -Thursday 8 am-8 pm; Friday 8 am-6 pm; Saturday and Sunday 9 am-5 pm.     Clinic: [phone]   Pharmacy: [phone]       *CHEST PAIN, UNCERTAIN CAUSE    Based on your exam today, the exact cause of your chest pain is not certain. Your condition does not seem serious at this time, and your pain does not appear to be coming from your heart. However, sometimes the signs of a serious problem take more time to appear. Therefore, watch for the warning signs listed below.  HOME CARE:    1. Rest today and avoid strenuous activity.  2. Take any prescribed medicine as directed.  FOLLOW UP with your doctor in 1-3 days.   GET PROMPT MEDICAL ATTENTION if any of the following occur:    A change in the type of pain: if it feels different, becomes more severe, lasts longer, or begins to spread into your shoulder, arm, neck, jaw or back    Shortness of breath or increased pain with breathing    Weakness, dizziness, or fainting    Cough with blood or dark colored sputum (phlegm)    Fever over 101  F (38.3  C)    Swelling, pain or redness in one leg    0730-8070 [employer]. [address] [place] [address], [place] [address]. All rights reserved. This information is not intended as a substitute for professional medical care. Always follow your healthcare professional's instructions.  This information has been modified by your health care provider with permission from the publisher.            Follow-ups  after your visit        Additional Services     CARDIOLOGY EVAL ADULT REFERRAL       Preferred location:  [facility] [phone]   [email or web address]    Please be aware that coverage of these services is subject to the terms and limitations of your health insurance plan.  Call member services at your health plan with any benefit or coverage questions.      Please bring the following to your appointment:  Any x-rays, CTs or MRIs which have been performed. Contact the facility where they were done to arrange for  prior to your scheduled appointment.    List of current medications  This referral request   Any documents/labs given to you for this referral                  Future tests that were ordered for you today     Open Future Orders        Priority Expected Expires Ordered    Dobutamine Stress Echocardiogram Routine  9/12/2019 9/12/2018    XR Chest 2 Views Routine 9/12/2018 9/12/2019 9/12/2018            Who to contact     If you have questions or need follow up information about today's clinic visit or your schedule please contact [facility] [place] directly at [phone].  Normal or non-critical lab and imaging results will be communicated to you by MyChart, letter or phone within 4 business days after the clinic has received the results. If you do not hear from us within 7 days, please contact the clinic through MyChart or phone. If you have a critical or abnormal lab result, we will notify you by phone as soon as possible.  Submit refill requests through [employer] or call your pharmacy and they will forward the refill request to us. Please allow 3 business days for your refill to be completed.          Additional Information About Your Visit        Care EveryWhere ID     This is your Care EveryWhere ID. This could be used by other organizations to access your [facility]  "records  [id number]        Your Vitals Were     Pulse Temperature Height Pulse Oximetry BMI (Body Mass Index)       88 98  F (36.7  C) (Oral) 4' 10\" (1.473 m) 98% 37.95 kg/m2        Blood Pressure from Last 3 Encounters:   09/12/18 116/80   11/15/17 136/72   05/25/16 126/82    Weight from Last 3 Encounters:   09/12/18 181 lb 9.6 oz (82.4 kg)   11/15/17 173 lb (78.5 kg)   05/25/16 176 lb 9.6 oz (80.1 kg)              We Performed the Following     CARDIOLOGY EVAL ADULT REFERRAL     EKG 12-lead complete w/read - Clinics          Today's Medication Changes          These changes are accurate as of 9/12/18  4:15 PM.  If you have any questions, ask your nurse or doctor.               Stop taking these medicines if you haven't already. Please contact your care team if you have questions.     furosemide 20 MG tablet   Commonly known as:  LASIX   Stopped by:  Felicia Ayala APRN CNP           losartan 25 MG tablet   Commonly known as:  COZAAR   Stopped by:  Felicia Ayala APRN CNP           nystatin cream   Commonly known as:  MYCOSTATIN   Stopped by:  Felicia Ayala APRN CNP           oxyCODONE IR 5 MG tablet   Commonly known as:  ROXICODONE   Stopped by:  Felicia Ayala APRN CNP           oxyCODONE-acetaminophen 5-325 MG per tablet   Commonly known as:  PERCOCET   Stopped by:  Felicia Ayala APRN CNP           tamsulosin 0.4 MG capsule   Commonly known as:  FLOMAX   Stopped by:  Felicia Ayala APRN CNP                Where to get your medicines      These medications were [place] [facility] [place] [address]  [address], [place] [address]     Phone:  [phone]     lovastatin 40 MG tablet                Primary Care Provider Office Phone # Fax #    Lien Link [phone]       [address]  [place] [address]        Equal Access to Services     RAUL GUY AH: Hipolito Cardona, " wadarleneda damien, qaybta kamich lowry, leanna begumcalvin ah. So [facility] [phone].    ATENCIÓN: Si brela abbey, tiene a canchola disposición servicios gratuitos de asistencia lingüística. Héctor al [phone].    We comply with applicable federal civil rights laws and [place] laws. We do not discriminate on the basis of race, color, national origin, age, disability, sex, sexual orientation, or gender identity.            Thank you!     Thank you for choosing [facility]  for your care. Our goal is always to provide you with excellent care. Hearing back from our patients is one way we can continue to improve our services. Please take a few minutes to complete the written survey that you may receive in the mail after your visit with us. Thank you!             Your Updated Medication List - Protect others around you: Learn how to safely use, store and throw away your medicines at www.disposemymeds.org.          This list is accurate as of 9/12/18  4:15 PM.  Always use your most recent med list.                   Brand Name Dispense Instructions for use Diagnosis    lovastatin 40 MG tablet    MEVACOR    90 tablet    Take 1 tablet (40 mg) by mouth At Bedtime Needs to be seen for more.    Hyperlipidemia LDL goal <130

## 2018-09-12 NOTE — LETTER
September 17, 2018      Amalia Harris  [address] [place] [address]        Hi Amalia,     Your urine Microalbumin was normal for you and your thyroid function tests were also normal for you.     Your blood sugar was 118 (elevated) but you were not fasting.  Your kidney and liver function are normal.     Your cholesterol was abnormal. Genetics, diet, weight and low exercise levels can contribute to this.  Elevated LDL cholesterol and triglycerides as well as low HDL cholesterol all increase a person's risk for heart and vascular disease.     I recommend you continue with  a low fat and low cholesterol diet, weight loss and regular exercise to see if you can't improve this a bit. Please continue on the Mevacor as previously prescribed. Recheck in 12 months.     Your white blood count was elevated at 14 but your indicies were normal for you. Have you been ill recently with URI type symptoms?  We might consider doing a chest film on you.     Feel free to contact me with any questions or concerns.  Thank you for allowing me to participate in your care.         Resulted Orders   Albumin Random Urine Quantitative with Creat Ratio   Result Value Ref Range    Creatinine Urine 44 mg/dL    Albumin Urine mg/L 10 mg/L    Albumin Urine mg/g Cr 22.32 0 - 25 mg/g Cr   TSH with free T4 reflex   Result Value Ref Range    TSH 2.09 0.40 - 4.00 mU/L   CBC with platelets   Result Value Ref Range    WBC 14.0 (H) 4.0 - 11.0 10e9/L    RBC Count 4.11 3.8 - 5.2 10e12/L    Hemoglobin 12.3 11.7 - 15.7 g/dL    Hematocrit 38.1 35.0 - 47.0 %    MCV 93 78 - 100 fl    MCH 29.9 26.5 - 33.0 pg    MCHC 32.3 31.5 - 36.5 g/dL    RDW 13.2 10.0 - 15.0 %    Platelet Count 354 150 - 450 10e9/L   **Comprehensive metabolic panel FUTURE anytime   Result Value Ref Range    Sodium 141 133 - 144 mmol/L    Potassium 4.3 3.4 - 5.3 mmol/L    Chloride 105 94 - 109 mmol/L    Carbon Dioxide 28 20 - 32 mmol/L    Anion Gap 8 3 - 14 mmol/L    Glucose 118 (H) 70 - 99  mg/dL      Comment:      Non Fasting    Urea Nitrogen 25 7 - 30 mg/dL    Creatinine 0.85 0.52 - 1.04 mg/dL    GFR Estimate 67 >60 mL/min/1.7m2      Comment:      Non  GFR Calc    GFR Estimate If Black 81 >60 mL/min/1.7m2      Comment:       GFR Calc    Calcium 9.1 8.5 - 10.1 mg/dL    Bilirubin Total 0.3 0.2 - 1.3 mg/dL    Albumin 3.2 (L) 3.4 - 5.0 g/dL    Protein Total 7.7 6.8 - 8.8 g/dL    Alkaline Phosphatase 78 40 - 150 U/L    ALT 26 0 - 50 U/L    AST 16 0 - 45 U/L   Lipid Profile (Chol, Trig, HDL, LDL calc)   Result Value Ref Range    Cholesterol 250 (H) <200 mg/dL      Comment:      Desirable:       <200 mg/dl    Triglycerides 286 (H) <150 mg/dL      Comment:      Borderline high:  150-199 mg/dl  High:             200-499 mg/dl  Very high:       >499 mg/dl  Non Fasting      HDL Cholesterol 65 >49 mg/dL    LDL Cholesterol Calculated 128 (H) <100 mg/dL      Comment:      Above desirable:  100-129 mg/dl  Borderline High:  130-159 mg/dL  High:             160-189 mg/dL  Very high:       >189 mg/dl      Non HDL Cholesterol 185 (H) <130 mg/dL      Comment:      Above Desirable:  130-159 mg/dl  Borderline high:  160-189 mg/dl  High:             190-219 mg/dl  Very high:       >219 mg/dl         If you have any questions or concerns, please call the clinic at the number listed above.       Sincerely,        Felicia Ayala, APRN CNP/TJ

## 2018-09-12 NOTE — PROGRESS NOTES

## 2018-09-12 NOTE — LETTER
September 18, 2018      Amalia Harris  [address] [place] [address]    Hi Amalia,     Your Quantiferon screen was positive so I do want to do a chest film on you.  You can schedule a Radiology appt to have this done and then follow back with me to review your results.  Feel free to contact me with any questions or concerns.  Thank you for allowing me to participate in your care.     Felicia Ayala APRN, CNP/smj    Resulted Orders   Albumin Random Urine Quantitative with Creat Ratio   Result Value Ref Range    Creatinine Urine 44 mg/dL    Albumin Urine mg/L 10 mg/L    Albumin Urine mg/g Cr 22.32 0 - 25 mg/g Cr   TSH with free T4 reflex   Result Value Ref Range    TSH 2.09 0.40 - 4.00 mU/L   CBC with platelets   Result Value Ref Range    WBC 14.0 (H) 4.0 - 11.0 10e9/L    RBC Count 4.11 3.8 - 5.2 10e12/L    Hemoglobin 12.3 11.7 - 15.7 g/dL    Hematocrit 38.1 35.0 - 47.0 %    MCV 93 78 - 100 fl    MCH 29.9 26.5 - 33.0 pg    MCHC 32.3 31.5 - 36.5 g/dL    RDW 13.2 10.0 - 15.0 %    Platelet Count 354 150 - 450 10e9/L   **Comprehensive metabolic panel FUTURE anytime   Result Value Ref Range    Sodium 141 133 - 144 mmol/L    Potassium 4.3 3.4 - 5.3 mmol/L    Chloride 105 94 - 109 mmol/L    Carbon Dioxide 28 20 - 32 mmol/L    Anion Gap 8 3 - 14 mmol/L    Glucose 118 (H) 70 - 99 mg/dL      Comment:      Non Fasting    Urea Nitrogen 25 7 - 30 mg/dL    Creatinine 0.85 0.52 - 1.04 mg/dL    GFR Estimate 67 >60 mL/min/1.7m2      Comment:      Non  GFR Calc    GFR Estimate If Black 81 >60 mL/min/1.7m2      Comment:       GFR Calc    Calcium 9.1 8.5 - 10.1 mg/dL    Bilirubin Total 0.3 0.2 - 1.3 mg/dL    Albumin 3.2 (L) 3.4 - 5.0 g/dL    Protein Total 7.7 6.8 - 8.8 g/dL    Alkaline Phosphatase 78 40 - 150 U/L    ALT 26 0 - 50 U/L    AST 16 0 - 45 U/L   Lipid Profile (Chol, Trig, HDL, LDL calc)   Result Value Ref Range    Cholesterol 250 (H) <200 mg/dL      Comment:      Desirable:       <200  mg/dl    Triglycerides 286 (H) <150 mg/dL      Comment:      Borderline high:  150-199 mg/dl  High:             200-499 mg/dl  Very high:       >499 mg/dl  Non Fasting      HDL Cholesterol 65 >49 mg/dL    LDL Cholesterol Calculated 128 (H) <100 mg/dL      Comment:      Above desirable:  100-129 mg/dl  Borderline High:  130-159 mg/dL  High:             160-189 mg/dL  Very high:       >189 mg/dl      Non HDL Cholesterol 185 (H) <130 mg/dL      Comment:      Above Desirable:  130-159 mg/dl  Borderline high:  160-189 mg/dl  High:             190-219 mg/dl  Very high:       >219 mg/dl     Quantiferon TB Gold Plus   Result Value Ref Range    Quantiferon-TB Gold Plus Result Positive (A) NEG^Negative      Comment:      Interferon gamma response to M.tuberculosis antigens was detected,suggesting   infection with M.tuberculosis. Positive results in patients at low risk for   infection should be interpreted with caution and repeat testing on a new   sample should be considered  Interferon gamma response to M.tuberculosis antigens was detected,suggesting   infection with M.tuberculosis. Positive results in patients at low risk for   infection should be interpreted with caution and repeat testing on a new   sample should be considered    as recommended by the 2017 ATS/IDSA/CDC Clinical Practice Guidelines for   Diagnosis of Tuberculosis in Adults and Children [Renée DM et al.   Clin.Infect.Dis. 2017 64(92):111-115]. False positive results may occur in   patients with prior infection with M.marinum, M.szulgai or M.kansasii.      TB1 Ag minus Nil Value 1.04 IU/mL    TB2 Ag minus Nil Value 1.68 IU/mL    Mitogen minus Nil Result >10.00 IU/mL    Nil Result 0.06 IU/mL

## 2018-09-13 LAB
CREAT UR-MCNC: 44 MG/DL
MICROALBUMIN UR-MCNC: 10 MG/L
MICROALBUMIN/CREAT UR: 22.32 MG/G CR (ref 0–25)

## 2018-09-13 ASSESSMENT — PATIENT HEALTH QUESTIONNAIRE - PHQ9: SUM OF ALL RESPONSES TO PHQ QUESTIONS 1-9: 15

## 2018-09-13 ASSESSMENT — ANXIETY QUESTIONNAIRES: GAD7 TOTAL SCORE: 12

## 2018-09-17 LAB
GAMMA INTERFERON BACKGROUND BLD IA-ACNC: 0.06 IU/ML
M TB IFN-G BLD-IMP: POSITIVE
M TB IFN-G CD4+ BCKGRND COR BLD-ACNC: >10 IU/ML
MITOGEN IGNF BCKGRD COR BLD-ACNC: 1.04 IU/ML
MITOGEN IGNF BCKGRD COR BLD-ACNC: 1.68 IU/ML

## 2018-10-18 ENCOUNTER — TELEPHONE (OUTPATIENT)
Dept: FAMILY MEDICINE | Facility: CLINIC | Age: 68
End: 2018-10-18

## 2018-10-18 DIAGNOSIS — R76.12 POSITIVE QUANTIFERON-TB GOLD TEST: Primary | ICD-10-CM

## 2018-10-18 NOTE — TELEPHONE ENCOUNTER
Routing to RN to advise of results. [facility]  might be needed.    Yamila Pastor MA

## 2018-10-18 NOTE — TELEPHONE ENCOUNTER
This writer attempted to contact Amalia on 10/18/18      Reason for call assist with questions and left message.      If patient calls back:   Patient contacted by a Registered Nurse. Inform patient that someone from the RN group will contact them, document that pt called and route to P DYAD 3 RN POOL [[id number]]        Isabell Huynh RN

## 2018-10-18 NOTE — TELEPHONE ENCOUNTER
..Reason for Call:  Request for results:    Name of test or procedure: lab draw    Date of test of procedure: 9/12/2018    Location of the test or procedure: [facility]    OK to leave the result message on voice mail or with a family member? YES    Phone number Patient can be reached at:  Cell number on file:    Telephone Information:   Mobile [phone]       Additional comments: possible will need [facility]  when calling. Patient was wondering about the call she got concerning lab results/billing? She wanted to confirm what the calls were about.     Call taken on 10/18/2018 at 9:21 AM by Jessi Bullock

## 2018-10-19 NOTE — TELEPHONE ENCOUNTER
This writer attempted to contact Amalia with [facility]  on 10/19/18      Reason for call clarify what people needs assistance with and left message.      If patient calls back:   Patient contacted by a Registered Nurse. Inform patient that someone from the RN group will contact them, document that pt called and route to P DYAD 3 RN POOL [[id number]]        Lyric Maki, RN

## 2018-10-22 NOTE — TELEPHONE ENCOUNTER
This writer attempted to contact Amalia with  [employer]  #[id number]  on 10/22/18      Reason for call clarify original message, unclear what patient is needing or has questions about  and unable to leave message, no answer after several rings with . Call was disconnected. Will have to attempt call again at later time. No alternate number listed in chart.      If patient calls back:   Patient contacted by a Registered Nurse. Inform patient that someone from the RN group will contact them, document that pt called and route to P DYAD 3 RN POOL [[id number]]        Cathryn Mcdaniel RN

## 2018-10-23 NOTE — TELEPHONE ENCOUNTER
This writer attempted to contact Amalia with  [phone] on 10/23/18      Reason for call clarify what we can do for the patient and unable to leave message line was busy.      If patient calls back:Find out what the patient needs.   Patient contacted by a Registered Nurse. Inform patient that someone from the RN group will contact them, document that pt called and route to P DYAD 3 RN POOL [[id number]]        Lyric Maki, RN

## 2018-10-23 NOTE — TELEPHONE ENCOUNTER
..Pt  returned call    Best number to reach caller: Home number on file [phone] (home)    Is it ok to leave a detailed message: will need an

## 2018-10-24 NOTE — TELEPHONE ENCOUNTER
This writer attempted to contact Amalia on 10/24/18 via [employer]       Reason for call answer questions and left message.      If patient calls back:   Patient contacted by a Registered Nurse. Inform patient that someone from the RN group will contact them, document that pt called and route to P DYAD 3 RN POOL [[id number]]        Davonte Baumann RN, BSN

## 2018-10-25 NOTE — TELEPHONE ENCOUNTER
Reason for Call:  Other     Detailed comments: 9:11 am returned. Call please call back with [facility] .  Some but not much english.    Phone Number Patient can be reached at: Home number on file [phone] (home)    Best Time: any     Can we leave a detailed message on this number? YES    Call taken on 10/25/2018 at 9:12 AM by Sil Trinh

## 2018-10-25 NOTE — TELEPHONE ENCOUNTER
Result Notes   Notes Recorded by Isidra Lai MA on 9/18/2018 at 8:34 AM  Letter sent.  Azra MEDINA    ------    Notes Recorded by Felicia Ayala APRN CNP on 9/17/2018 at 7:20 PM  Costa Holland,    Your Quantiferon screen was positive so I do want to do a chest film on you.  You can schedule a Radiology appt to have this done and then follow back with me to review your results.  Feel free to contact me with any questions or concerns.  Thank you for allowing me to participate in your care.    Felicia TERESA, CNP     Patient never received letter regarding result. Spoke with patient via [employer]  #[id number] - advised patient to schedule a radiology appointment for a Chest Film. Due to delayed reception of results and needed action, provider is there anything else you would advise patient on at this time? Patient was also advised to schedule a follow-up visit with you to discuss Chest Film results after.     Jazmyn Ballesteros RN

## 2018-10-26 NOTE — TELEPHONE ENCOUNTER
Patient spoke with RN on 10/25/18 regarding questions and concerns pertaining to recent lab results notice. Patient was advised by RN to schedule radiology appt for chest x-ray and then to follow up with provider afterwards for discussion. Per provider, nothing further is recommended at this time. Closing encounter as patient has been informed of next steps.    Cathryn Mcdaniel RN  [facility] Triage

## 2018-12-07 ENCOUNTER — RADIANT APPOINTMENT (OUTPATIENT)
Dept: MAMMOGRAPHY | Facility: CLINIC | Age: 68
End: 2018-12-07
Attending: FAMILY MEDICINE
Payer: MEDICARE

## 2018-12-07 DIAGNOSIS — Z12.31 VISIT FOR SCREENING MAMMOGRAM: ICD-10-CM

## 2018-12-07 PROCEDURE — 77067 SCR MAMMO BI INCL CAD: CPT | Mod: TC

## 2018-12-10 ENCOUNTER — OFFICE VISIT (OUTPATIENT)
Dept: FAMILY MEDICINE | Facility: CLINIC | Age: 68
End: 2018-12-10
Payer: MEDICARE

## 2018-12-10 VITALS
SYSTOLIC BLOOD PRESSURE: 136 MMHG | DIASTOLIC BLOOD PRESSURE: 89 MMHG | OXYGEN SATURATION: 97 % | BODY MASS INDEX: 36.73 KG/M2 | TEMPERATURE: 97.8 F | HEART RATE: 83 BPM | HEIGHT: 58 IN | WEIGHT: 175 LBS

## 2018-12-10 DIAGNOSIS — Z12.11 SCREEN FOR COLON CANCER: ICD-10-CM

## 2018-12-10 DIAGNOSIS — I10 HYPERTENSION GOAL BP (BLOOD PRESSURE) < 140/90: ICD-10-CM

## 2018-12-10 DIAGNOSIS — E78.5 HYPERLIPIDEMIA LDL GOAL <130: ICD-10-CM

## 2018-12-10 DIAGNOSIS — R73.03 PREDIABETES: ICD-10-CM

## 2018-12-10 DIAGNOSIS — Z78.0 ASYMPTOMATIC POSTMENOPAUSAL STATUS: ICD-10-CM

## 2018-12-10 DIAGNOSIS — Z00.00 ENCOUNTER FOR ROUTINE HISTORY AND PHYSICAL EXAM IN FEMALE: Primary | ICD-10-CM

## 2018-12-10 PROBLEM — N20.0 URIC ACID NEPHROLITHIASIS: Status: ACTIVE | Noted: 2017-09-26

## 2018-12-10 PROBLEM — N20.9 URINARY TRACT STONES: Status: ACTIVE | Noted: 2017-12-14

## 2018-12-10 PROBLEM — R82.991 HYPOCITRATURIA: Status: ACTIVE | Noted: 2017-12-14

## 2018-12-10 PROBLEM — R34 LOW URINE OUTPUT: Status: ACTIVE | Noted: 2017-12-14

## 2018-12-10 PROBLEM — I50.9 CHF (CONGESTIVE HEART FAILURE) (H): Status: ACTIVE | Noted: 2017-09-27

## 2018-12-10 PROCEDURE — 99397 PER PM REEVAL EST PAT 65+ YR: CPT | Performed by: NURSE PRACTITIONER

## 2018-12-10 RX ORDER — LOSARTAN POTASSIUM 25 MG/1
25 TABLET ORAL DAILY
Qty: 90 TABLET | Refills: 3 | Status: SHIPPED | OUTPATIENT
Start: 2018-12-10 | End: 2019-09-05

## 2018-12-10 RX ORDER — LOSARTAN POTASSIUM 25 MG/1
25 TABLET ORAL
COMMUNITY
Start: 2017-11-15 | End: 2018-12-10

## 2018-12-10 ASSESSMENT — MIFFLIN-ST. JEOR: SCORE: 1223.54

## 2018-12-10 NOTE — PROGRESS NOTES
"  SUBJECTIVE:   Amalia Harris is a 68 year old female who presents for Preventive Visit.  Are you in the first 12 months of your Medicare Part B coverage?  No     Physical Health:    In general, how would you rate your overall physical health? good    Outside of work, how many days during the week do you exercise? 2-3 days/week    Outside of work, approximately how many minutes a day do you exercise?15-30 minutes treadmilll    If you drink alcohol do you typically have >3 drinks per day or >7 drinks per week? Not Applicable    Do you usually eat at least 4 servings of fruit and vegetables a day, include whole grains & fiber and avoid regularly eating high fat or \"junk\" foods? Yes    Do you have any problems taking medications regularly?  YES    Do you have any side effects from medications? none    Needs assistance for the following daily activities: no assistance needed    Which of the following safety concerns are present in your home?  none identified     Hearing impairment: Yes, 10 yards away from pt she has a hard time hearing others    In the past 6 months, have you been bothered by leaking of urine? no    Mental Health:    In general, how would you rate your overall mental or emotional health? good  PHQ-2 Score:      Do you feel safe in your environment? Yes    Do you have a Health Care Directive? No: Advance care planning reviewed with patient; information given to patient to review.    Additional concerns to address?  No    Fall risk:     Cognitive Screenin) Repeat 3 items (Leader, Season, Table)    2) Clock draw: NORMAL  3) 3 item recall: Recalls 3 objects  Results: 3 items recalled: COGNITIVE IMPAIRMENT LESS LIKELY    Mini-CogTM Copyright MICAELA Varner. Licensed by the author for use in [facility]; reprinted with permission (maikel@.[facility]). All rights reserved.      Do you have sleep apnea, excessive snoring or daytime drowsiness?: yes- snores at night no snort arousals or apneic spells. Sleep is " "restorative and she does not nap during the day.        PreDiabetes Follow-up      Patient is checking blood sugars: not at all    Diabetic concerns: None     Symptoms of hypoglycemia (low blood sugar): none     Paresthesias (numbness or burning in feet) or sores: No     Date of last diabetic eye exam:     Diabetes Management Resources    Hyperlipidemia Follow-Up      Rate your low fat/cholesterol diet?: good    Taking statin?  Yes, no muscle aches from statin    Other lipid medications/supplements?:  none    Hypertension Follow-up      Outpatient blood pressures are being checked at home.  Results are  \"normal.\".    Low Salt Diet: low salt    BP Readings from Last 2 Encounters:   12/10/18 136/89   09/12/18 116/80     Hemoglobin A1C (%)   Date Value   05/25/2016 6.4 (H)     LDL Cholesterol Calculated (mg/dL)   Date Value   09/12/2018 128 (H)   11/15/2017 83       Reviewed and updated as needed this visit by clinical staff  Tobacco  Allergies  Meds  Problems  Med Hx  Surg Hx  Fam Hx  Soc Hx          Reviewed and updated as needed this visit by Provider  Allergies  Meds  Problems        Social History     Tobacco Use     Smoking status: Never Smoker     Smokeless tobacco: Never Used   Substance Use Topics     Alcohol use: No     Alcohol/week: 0.0 oz                           Current providers sharing in care for this patient include:   Patient Care Team:  Lien Sands MD as PCP - General (Family Practice)    The following health maintenance items are reviewed in Epic and correct as of today:  Health Maintenance   Topic Date Due     HF ACTION PLAN Q3 YR  1950     ZOSTER IMMUNIZATION (1 of 2) 04/06/2002     DTAP/TDAP/TD IMMUNIZATION (2 - Td) 10/25/2011     DEXA SCAN SCREENING (SYSTEM ASSIGNED)  04/06/2017     FIT Q1 YR  05/25/2017     PNEUMOVAX IMMUNIZATION 65+ LOW/MEDIUM RISK (2 of 2 - PPSV23) 11/15/2018     BMP Q6 MOS  03/12/2019     PHQ-9 Q6 MONTHS  03/12/2019     ALT Q1 YR  " 09/12/2019     FALL RISK ASSESSMENT  09/12/2019     LIPID MONITORING Q1 YEAR  09/12/2019     CBC Q1 YR  09/12/2019     MAMMO SCREEN Q2 YR (SYSTEM ASSIGNED)  12/07/2020     ADVANCE DIRECTIVE PLANNING Q5 YRS  05/11/2021     DEPRESSION ACTION PLAN  Completed     INFLUENZA VACCINE  Completed     HEPATITIS C SCREENING  Completed     IPV IMMUNIZATION  Aged Out     MENINGITIS IMMUNIZATION  Aged Out     Labs reviewed in EPIC  BP Readings from Last 3 Encounters:   12/10/18 136/89   09/12/18 116/80   11/15/17 136/72    Wt Readings from Last 3 Encounters:   12/10/18 79.4 kg (175 lb)   09/12/18 82.4 kg (181 lb 9.6 oz)   11/15/17 78.5 kg (173 lb)                  Patient Active Problem List   Diagnosis     Hyperlipidemia LDL goal <130     Major depression     Osteoarthritis     Advance care planning     Intertriginous candidiasis     Morbid obesity considered morbid due to HTN (H)     Prediabetes     Guaiac positive stools     Hypertension goal BP (blood pressure) < 140/90     CHF (congestive heart failure) (H)     Hypocitraturia     Low urine output     Uric acid nephrolithiasis     Urinary tract stones     Past Surgical History:   Procedure Laterality Date     GALLBLADDER SURGERY      was done in 86       Social History     Tobacco Use     Smoking status: Never Smoker     Smokeless tobacco: Never Used   Substance Use Topics     Alcohol use: No     Alcohol/week: 0.0 oz     Family History   Problem Relation Age of Onset     Respiratory Father      Cancer Brother          Recent Labs   Lab Test 09/12/18  1623 11/15/17  1605  05/25/16  1424 05/11/16  1448 08/28/14  0831  07/18/11  1227   A1C  --   --   --  6.4*  --   --   --   --    * 83  --   --  176* 132*   < > 173*   HDL 65 37*  --   --   --  49*  --  49*   TRIG 286* 376*  --   --   --  173*  --   --    ALT 26 29  --   --   --   --   --  28   CR 0.85 0.87   < >  --   --   --   --  0.67   GFRESTIMATED 67 65   < >  --   --   --   --  >90   GFRESTBLACK 81 79   < >  --   --   " --   --  >90   POTASSIUM 4.3 3.8   < >  --   --   --   --  3.9   TSH 2.09  --   --   --   --   --   --   --     < > = values in this interval not displayed.        ROS:  Constitutional, HEENT, cardiovascular, pulmonary, GI, , musculoskeletal, neuro, skin, endocrine and psych systems are negative, except as otherwise noted.    OBJECTIVE:   /89   Pulse 83   Temp 97.8  F (36.6  C) (Oral)   Ht 1.473 m (4' 10\")   Wt 79.4 kg (175 lb)   SpO2 97%   BMI 36.58 kg/m   Estimated body mass index is 36.58 kg/m  as calculated from the following:    Height as of this encounter: 1.473 m (4' 10\").    Weight as of this encounter: 79.4 kg (175 lb).  EXAM:   GENERAL: healthy, alert and no distress  EYES: Eyes grossly normal to inspection, PERRL and conjunctivae and sclerae normal  HENT: ear canals and TM's normal, nose and mouth without ulcers or lesions  NECK: no adenopathy, no asymmetry, masses, or scars and thyroid normal to palpation  RESP: lungs clear to auscultation - no rales, rhonchi or wheezes  BREAST: normal without masses, tenderness or nipple discharge and no palpable axillary masses or adenopathy  CV: regular rate and rhythm, normal S1 S2, no S3 or S4, no murmur, click or rub, no peripheral edema and peripheral pulses strong  ABDOMEN: soft, nontender, no hepatosplenomegaly, no masses and bowel sounds normal   (female): deferred  MS: no gross musculoskeletal defects noted, no edema  SKIN: no suspicious lesions or rashes  NEURO: Normal strength and tone, mentation intact and speech normal  PSYCH: mentation appears normal, affect normal/bright  LYMPH: no cervical, supraclavicular, axillary, or inguinal adenopathy    Diagnostic Test Results:  none     ASSESSMENT / PLAN:   1. Encounter for routine history and physical exam in female      2. Prediabetes  Discussed prior A1c  Of 6.4 5/2016 and need for re screening given her increased risk for developing diabetes.  - Hemoglobin A1c; Future    3. Hypertension goal " "BP (blood pressure) < 140/90  BP at upper limit of normal, refilled Cozaar, advised weight loss, low salt diet, benefits of exercise. Return to clinic 6 months for BP recheck  - Albumin Random Urine Quantitative with Creat Ratio; Future  - Comprehensive metabolic panel; Future  - losartan (COZAAR) 25 MG tablet; Take 1 tablet (25 mg) by mouth daily  Dispense: 90 tablet; Refill: 3    4.  Hyperlipidemia LDL goal <130  Patient ahs refill of Mevacor, adjust dose based on Lipid results (due on 1 week).  - Comprehensive metabolic panel; Future  - Lipid Profile; Future    5. Screen for colon cancer    - Fecal colorectal cancer screen (FIT); Future    6. Asymptomatic postmenopausal status    - DEXA HIP/PELVIS/SPINE - Future; Future    End of Life Planning:  Patient currently has an advanced directive: No.  I have verified the patient's ablity to prepare an advanced directive/make health care decisions.  Literature was provided to assist patient in preparing an advanced directive.    COUNSELING:  Reviewed preventive health counseling, as reflected in patient instructions    BP Readings from Last 1 Encounters:   12/10/18 136/89     Estimated body mass index is 36.58 kg/m  as calculated from the following:    Height as of this encounter: 1.473 m (4' 10\").    Weight as of this encounter: 79.4 kg (175 lb).      Weight management plan: Discussed healthy diet and exercise guidelines     reports that  has never smoked. she has never used smokeless tobacco.      Appropriate preventive services were discussed with this patient, including applicable screening as appropriate for cardiovascular disease, diabetes, osteopenia/osteoporosis, and glaucoma.  As appropriate for age/gender, discussed screening for colorectal cancer, prostate cancer, breast cancer, and cervical cancer. Checklist reviewing preventive services available has been given to the patient.    Reviewed patients plan of care and provided an AVS. The Basic Care Plan " (routine screening as documented in Health Maintenance) for Amalia meets the Care Plan requirement. This Care Plan has been established and reviewed with the Patient.    Counseling Resources:  ATP IV Guidelines  Pooled Cohorts Equation Calculator  Breast Cancer Risk Calculator  FRAX Risk Assessment  ICSI Preventive Guidelines  Dietary Guidelines for Americans, 2010  USDA's MyPlate  ASA Prophylaxis  Lung CA Screening    BRII Barrientos CNP  [facility]

## 2018-12-10 NOTE — PATIENT INSTRUCTIONS
At [facility], we strive to deliver an exceptional experience to you, every time we see you.  If you receive a survey in the mail, please send us back your thoughts. We really do value your feedback.    Your care team:                            Family Medicine Internal Medicine   MD Gonzalo Palomino MD Shantel Branch-Fleming, MD Katya Georgiev PA-C Megan Hill, APRN CNP    Presley Perrin MD Pediatrics   Isaiah Garcia, NARCISO George, MD Chelsea Garcia APRN CNP   MD Kiersten Michele MD Deborah Mielke, MD Cata Ayala, APRN [facility]      Clinic hours: Monday - Thursday 7 am-7 pm; Fridays 7 am-5 pm.   Urgent care: Monday - Friday 11 am-9 pm; Saturday and Sunday 9 am-5 pm.  Pharmacy : Monday -Thursday 8 am-8 pm; Friday 8 am-6 pm; Saturday and Sunday 9 am-5 pm.     Clinic: [phone]   Pharmacy: [phone]        Preventive Health Recommendations    See your health care provider every year to    Review health changes.     Discuss preventive care.      Review your medicines if your doctor has prescribed any.      You no longer need a yearly Pap test unless you've had an abnormal Pap test in the past 10 years. If you have vaginal symptoms, such as bleeding or discharge, be sure to talk with your provider about a Pap test.      Every 1 to 2 years, have a mammogram.  If you are over 69, talk with your health care provider about whether or not you want to continue having screening mammograms.      Every 10 years, have a colonoscopy. Or, have a yearly FIT test (stool test). These exams will check for colon cancer.       Have a cholesterol test every 5 years, or more often if your doctor advises it.       Have a diabetes test (fasting glucose) every three years. If you are at risk for diabetes, you should have this test more often.       At age 65, have a bone density scan (DEXA) to check for osteoporosis (brittle bone disease).    Shots:    Get a flu  shot each year.    Get a tetanus shot every 10 years.    Talk to your doctor about your pneumonia vaccines. There are now two you should receive - Pneumovax (PPSV 23) and Prevnar (PCV 13).    Talk to your pharmacist about the shingles vaccine.    Talk to your doctor about the hepatitis B vaccine.    Nutrition:     Eat at least 5 servings of fruits and vegetables each day.      Eat whole-grain bread, whole-wheat pasta and brown rice instead of white grains and rice.      Get adequate about Calcium and Vitamin D.     Lifestyle    Exercise at least 150 minutes a week (30 minutes a day, 5 days a week). This will help you control your weight and prevent disease.      Limit alcohol to one drink per day.      No smoking.       Wear sunscreen to prevent skin cancer.       See your dentist twice a year for an exam and cleaning.      See your eye doctor every 1 to 2 years to screen for conditions such as glaucoma, macular degeneration, cataracts, etc.    Personalized Prevention Plan  You are due for the preventive services outlined below.  Your care team is available to assist you in scheduling these services.  If you have already completed any of these items, please share that information with your care team to update in your medical record.    Health Maintenance Due   Topic Date Due     Zoster (Chicken Pox) Vaccine (1 of 2) 04/06/2002     Diptheria Tetanus Pertussis (DTAP/TDAP/TD) Vaccine (2 - Td) 10/25/2011     Bone Density Screening (Dexa)  04/06/2017     Colon Cancer Screening - FIT Test - yearly  05/25/2017     Pneumococcal Vaccine (2 of 2 - PPSV23) 11/15/2018     Patient Education     Prevention Guidelines, Women Ages 50 to 64  Screening tests and vaccines are an important part of managing your health. A screening test is done to find possible disorders or diseases in people who don't have any symptoms. The goal is to find a disease early so lifestyle changes can be made and you can be watched more closely to reduce  the risk of disease, or to detect it early enough to treat it most effectively. Screening tests are not considered diagnostic, but are used to determine if more testing is needed. Health counseling is essential, too. Below are guidelines for these, for women ages 50 to 64. Talk with your healthcare provider to make sure you re up to date on what you need.  Screening Who needs it How often   Type 2 diabetes or prediabetes All women beginning at age 45 and women without symptoms at any age who are overweight or obese and have 1 or more additional risk factors for diabetes. At  least every 3 years   Type 2 diabetes or prediabetes All women diagnosed with gestational diabetes Lifelong testing every 3 years   Type 2 diabetes All women with prediabetes Every year   Alcohol misuse All women in this age group At routine exams   Blood pressure All women in this age group Yearly checkup if your blood pressure is normal  Normal blood pressure is less than 120/80 mm Hg  If your blood pressure reading is higher than normal, follow the advice of your healthcare provider   Breast cancer All women at average risk in this age group Yearly mammogram should be done until age 54. At age 55, switch to mammograms every other year or choose to continue yearly mammograms.  All women should be familiar with the potential benefits and risks of breast cancer screening with mammograms.      Cervical cancer All women in this age group, except women who have had a complete hysterectomy Pap test every 3 years or Pap test with human papillomavirus (HPV) test every 5 years   Chlamydia Women at increased risk for infection At routine exams   Colorectal cancer All women in this age group Flexible sigmoidoscopy every 5 years, or colonoscopy every 10 years, or double-contrast barium enema every 5 years; yearly fecal occult blood test or fecal immunochemical test; or a stool DNA test as often as your health care provider advises; talk with your health  care provider about which tests are best for you   Depression All women in this age group At routine exams   Gonorrhea Sexually active women at increased risk for infection At routine exams   Hepatitis C Anyone at increased risk; 1 time for those born between 1945 and 1965 At routine exams   High cholesterol or triglycerides All women in this age group who are at risk for coronary artery disease At least every 5 years   HIV All women At routine exams   Lung cancer Adults age 55 to 80 who have smoked Yearly screening in smokers with 30 pack-year history of smoking or who quit within 15 years   Obesity All women in this age group At routine exams   Osteoporosis Women who are postmenopausal Ask your healthcare provider   Syphilis Women at increased risk for infection - talk with your healthcare provider At routine exams   Tuberculosis Women at increased risk for infection - talk with your healthcare provider Ask your healthcare provider   Vision All women in this age group Ask your healthcare provider   Vaccine Who needs it How often   Chickenpox (varicella) All women in this age group who have no record of this infection or vaccine 2 doses; the second dose should be given at least 4 weeks after the first dose   Hepatitis A Women at increased risk for infection - talk with your healthcare provider 2 doses given at least 6 months apart   Hepatitis B Women at increased risk for infection - talk with your healthcare provider 3 doses over 6 months; second dose should be given 1 month after the first dose; the third dose should be given at least 2 months after the second dose and at least 4 months after the first dose   Haemophilus influenzaeType B (HIB) Women at increased risk for infection - talk with your healthcare provider 1 to 3 doses   Influenza (flu) All women in this age group Once a year   Measles, mumps, rubella (MMR) Women in this age group through their late 50s who have no record of these infections or  vaccines 1 dose   Meningococcal Women at increased risk for infection - talk with your healthcare provider 1 or more doses   Pneumococcal conjugate vaccine (PCV13) and pneumococcal polysaccharide vaccine (PPSV23) Women at increased risk for infection - talk with your healthcare provider PCV13: 1 dose ages 19 to 65 (protects against 13 types of pneumococcal bacteria)  PPSV23: 1 to 2 doses through age 64, or 1 dose at 65 or older (protects against 23 types of pneumococcal bacteria)   Tetanus/diphtheria/pertussis (Td/Tdap) booster All women in this age group Td every 10 years, or a one-time dose of Tdap instead of a Td booster after age 18, then Td every 10 years   Zoster All women ages 60 and older 1 dose   Counseling Who needs it How often   BRCA gene mutation testing for breast and ovarian cancer susceptibility Women with increased risk for having gene mutation When your risk is known   Breast cancer and chemoprevention Women at high risk for breast cancer When your risk is known   Diet and exercise Women who are overweight or obese When diagnosed, and then at routine exams   Sexually transmitted infection prevention Women at increased risk for infection - talk with your healthcare provider At routine exams   Use of daily aspirin Women ages 55 and up in this age group who are at risk for cardiovascular health problems such as stroke When your risk is known   Use of tobacco and the health effects it can cause All women in this age group Every exam   1American [employer] Society  Date Last Reviewed: 1/26/2016 2000-2018 [employer]. [address] [place] [id number]. All rights reserved. This information is not intended as a substitute for professional medical care. Always follow your healthcare professional's instructions.

## 2018-12-17 DIAGNOSIS — E78.5 HYPERLIPIDEMIA LDL GOAL <130: ICD-10-CM

## 2018-12-17 DIAGNOSIS — I10 HYPERTENSION GOAL BP (BLOOD PRESSURE) < 140/90: ICD-10-CM

## 2018-12-17 DIAGNOSIS — R73.03 PREDIABETES: ICD-10-CM

## 2018-12-17 LAB
ALBUMIN SERPL-MCNC: 3.3 G/DL (ref 3.4–5)
ALP SERPL-CCNC: 81 U/L (ref 40–150)
ALT SERPL W P-5'-P-CCNC: 23 U/L (ref 0–50)
ANION GAP SERPL CALCULATED.3IONS-SCNC: 7 MMOL/L (ref 3–14)
AST SERPL W P-5'-P-CCNC: 18 U/L (ref 0–45)
BILIRUB SERPL-MCNC: 0.4 MG/DL (ref 0.2–1.3)
BUN SERPL-MCNC: 20 MG/DL (ref 7–30)
CALCIUM SERPL-MCNC: 8.6 MG/DL (ref 8.5–10.1)
CHLORIDE SERPL-SCNC: 107 MMOL/L (ref 94–109)
CHOLEST SERPL-MCNC: 251 MG/DL
CO2 SERPL-SCNC: 27 MMOL/L (ref 20–32)
CREAT SERPL-MCNC: 0.8 MG/DL (ref 0.52–1.04)
CREAT UR-MCNC: 100 MG/DL
GFR SERPL CREATININE-BSD FRML MDRD: 71 ML/MIN/1.7M2
GLUCOSE SERPL-MCNC: 126 MG/DL (ref 70–99)
HBA1C MFR BLD: 6.8 % (ref 0–5.6)
HDLC SERPL-MCNC: 48 MG/DL
LDLC SERPL CALC-MCNC: 168 MG/DL
MICROALBUMIN UR-MCNC: 233 MG/L
MICROALBUMIN/CREAT UR: 233 MG/G CR (ref 0–25)
NONHDLC SERPL-MCNC: 203 MG/DL
POTASSIUM SERPL-SCNC: 3.2 MMOL/L (ref 3.4–5.3)
PROT SERPL-MCNC: 7.7 G/DL (ref 6.8–8.8)
SODIUM SERPL-SCNC: 141 MMOL/L (ref 133–144)
TRIGL SERPL-MCNC: 177 MG/DL

## 2018-12-17 PROCEDURE — 82043 UR ALBUMIN QUANTITATIVE: CPT | Performed by: NURSE PRACTITIONER

## 2018-12-17 PROCEDURE — 80053 COMPREHEN METABOLIC PANEL: CPT | Performed by: NURSE PRACTITIONER

## 2018-12-17 PROCEDURE — 83036 HEMOGLOBIN GLYCOSYLATED A1C: CPT | Performed by: NURSE PRACTITIONER

## 2018-12-17 PROCEDURE — 36415 COLL VENOUS BLD VENIPUNCTURE: CPT | Performed by: NURSE PRACTITIONER

## 2018-12-17 PROCEDURE — 80061 LIPID PANEL: CPT | Performed by: NURSE PRACTITIONER

## 2018-12-24 DIAGNOSIS — Z12.11 SCREEN FOR COLON CANCER: ICD-10-CM

## 2018-12-24 LAB — HEMOCCULT STL QL IA: NEGATIVE

## 2018-12-24 PROCEDURE — 82274 ASSAY TEST FOR BLOOD FECAL: CPT | Performed by: NURSE PRACTITIONER

## 2019-02-21 ENCOUNTER — TELEPHONE (OUTPATIENT)
Dept: FAMILY MEDICINE | Facility: CLINIC | Age: 69
End: 2019-02-21

## 2019-02-21 NOTE — TELEPHONE ENCOUNTER
Panel Management Review   One phone call and send letter if unable to reach them or skyrockithart message and send letter if not read after 2 weeks (You will get a message to your inbasket)      BP Readings from Last 1 Encounters:   12/10/18 136/89    ,   Lab Results   Component Value Date    A1C 6.8 12/17/2018    A1C 6.4 05/25/2016   , 12/10/2018  Last Office Visit with this department: 12/10/2018    Fail List measure:     Depression / Dysthymia review    Measure:  Needs PHQ-9 score of 4 or less during index window.  Administer PHQ-9 and if score is 5 or more, send encounter to provider for next steps.    5 - 7 month window range: 1/12/19-5/12/19    PHQ-9 SCORE 5/25/2016 11/15/2017 9/12/2018   PHQ-9 Total Score - - -   PHQ-9 Total Score 8 1 15       If PHQ-9 recheck is 5 or more, route to provider for next steps.    Patient is due for:  PHQ9      Patient is due/failing the following:   PHQ9    Action needed:   Patient needs to do PHQ9.    Type of outreach:    Phone, left message for patient to call back.     Questions for provider review:    None                                                                                                                                    Mary Ann Saenz MA      Chart routed to Care Team .

## 2019-03-01 NOTE — TELEPHONE ENCOUNTER
Pls call patient and do PHQ-9 screen.  If no response, send letter asking her to set up an appt to review her depression.   Felicia TERESA, CNP

## 2019-03-27 NOTE — TELEPHONE ENCOUNTER
Panel Management Review   One phone call and send letter if unable to reach them or Ariistohart message and send letter if not read after 2 weeks (You will get a message to your inbasket)      BP Readings from Last 1 Encounters:   12/10/18 136/89    ,   Lab Results   Component Value Date    A1C 6.8 12/17/2018    A1C 6.4 05/25/2016   , 12/10/2018    Fail List measure:     Depression / Dysthymia review    Measure:  Needs PHQ-9 score of 4 or less during index window.  Administer PHQ-9 and if score is 5 or more, send encounter to provider for next steps.    5 - 7 month window range: 1/14/19-5/14/19    PHQ-9 SCORE 5/25/2016 11/15/2017 9/12/2018   PHQ-9 Total Score - - -   PHQ-9 Total Score 8 1 15       If PHQ-9 recheck is 5 or more, route to provider for next steps.    Patient is due for:  PHQ9        Patient is due/failing the following:   PHQ9    Action needed:   Patient needs to do PHQ9.    Type of outreach:    Phone, left message for patient to call back.     Questions for provider review:    None                                                                                                                                    Mary Ann Saenz MA      Chart routed to Care Team .

## 2019-04-17 ASSESSMENT — PATIENT HEALTH QUESTIONNAIRE - PHQ9: SUM OF ALL RESPONSES TO PHQ QUESTIONS 1-9: 2

## 2019-04-17 NOTE — TELEPHONE ENCOUNTER
Panel Management Review   One phone call and send letter if unable to reach them or Radario[employer] message and send letter if not read after 2 weeks (You will get a message to your inbasket)      BP Readings from Last 1 Encounters:   12/10/18 136/89    ,   Lab Results   Component Value Date    A1C 6.8 12/17/2018    A1C 6.4 05/25/2016   , 12/10/2018    Fail List measure:     Depression / Dysthymia review    Measure:  Needs PHQ-9 score of 4 or less during index window.  Administer PHQ-9 and if score is 5 or more, send encounter to provider for next steps.    5 - 7 month window range: 1/12/19-5/2/19    PHQ-9 SCORE 11/15/2017 9/12/2018 4/17/2019   PHQ-9 Total Score - - -   PHQ-9 Total Score 1 15 2       If PHQ-9 recheck is 5 or more, route to provider for next steps.    Patient is due for:  PHQ9        Patient is due/failing the following:   PHQ9    Action needed:   Patient needs to do PHQ9.    Type of outreach:    Phone, spoke to patient.  scored 2    Questions for provider review:    None                                                                                                                                    Mary Ann Saenz MA      Chart routed to NA .

## 2019-06-03 ENCOUNTER — OFFICE VISIT - HEALTHEAST (OUTPATIENT)
Dept: UROLOGY | Facility: CLINIC | Age: 69
End: 2019-06-03

## 2019-06-03 ENCOUNTER — AMBULATORY - HEALTHEAST (OUTPATIENT)
Dept: UROLOGY | Facility: CLINIC | Age: 69
End: 2019-06-03

## 2019-06-03 DIAGNOSIS — N20.0 NEPHROLITHIASIS, URIC ACID: ICD-10-CM

## 2019-06-03 DIAGNOSIS — N20.1 CALCULUS OF URETER: ICD-10-CM

## 2019-06-03 DIAGNOSIS — N13.2 HYDRONEPHROSIS WITH URINARY OBSTRUCTION DUE TO URETERAL CALCULUS: ICD-10-CM

## 2019-06-03 LAB
ALBUMIN UR-MCNC: NEGATIVE MG/DL
APPEARANCE UR: ABNORMAL
BILIRUB UR QL STRIP: NEGATIVE
COLOR UR AUTO: YELLOW
GLUCOSE UR STRIP-MCNC: NEGATIVE MG/DL
HGB UR QL STRIP: ABNORMAL
KETONES UR STRIP-MCNC: NEGATIVE MG/DL
LEUKOCYTE ESTERASE UR QL STRIP: ABNORMAL
NITRATE UR QL: NEGATIVE
PH UR STRIP: 5.5 [PH] (ref 5–8)
SP GR UR STRIP: <=1.005 (ref 1–1.03)
UROBILINOGEN UR STRIP-ACNC: ABNORMAL

## 2019-06-12 ENCOUNTER — AMBULATORY - HEALTHEAST (OUTPATIENT)
Dept: UROLOGY | Facility: CLINIC | Age: 69
End: 2019-06-12

## 2019-06-12 ENCOUNTER — COMMUNICATION - HEALTHEAST (OUTPATIENT)
Dept: UROLOGY | Facility: CLINIC | Age: 69
End: 2019-06-12

## 2019-06-12 ENCOUNTER — AMBULATORY - HEALTHEAST (OUTPATIENT)
Dept: LAB | Facility: CLINIC | Age: 69
End: 2019-06-12

## 2019-06-12 DIAGNOSIS — N20.0 NEPHROLITHIASIS, URIC ACID: ICD-10-CM

## 2019-06-12 DIAGNOSIS — N20.1 CALCULUS OF URETER: ICD-10-CM

## 2019-06-12 DIAGNOSIS — N20.0 CALCULUS OF KIDNEY: ICD-10-CM

## 2019-06-12 LAB
ALBUMIN UR-MCNC: NEGATIVE MG/DL
APPEARANCE UR: CLEAR
BILIRUB UR QL STRIP: NEGATIVE
COLOR UR AUTO: ABNORMAL
GLUCOSE UR STRIP-MCNC: NEGATIVE MG/DL
HGB UR QL STRIP: NEGATIVE
KETONES UR STRIP-MCNC: NEGATIVE MG/DL
LEUKOCYTE ESTERASE UR QL STRIP: ABNORMAL
NITRATE UR QL: NEGATIVE
PH UR STRIP: 6.5 [PH] (ref 4.5–8)
SP GR UR STRIP: 1.01 (ref 1–1.03)
UROBILINOGEN UR STRIP-ACNC: ABNORMAL

## 2019-06-14 ENCOUNTER — AMBULATORY - HEALTHEAST (OUTPATIENT)
Dept: UROLOGY | Facility: CLINIC | Age: 69
End: 2019-06-14

## 2019-06-14 ENCOUNTER — COMMUNICATION - HEALTHEAST (OUTPATIENT)
Dept: UROLOGY | Facility: CLINIC | Age: 69
End: 2019-06-14

## 2019-06-14 DIAGNOSIS — N30.00 ACUTE CYSTITIS WITHOUT HEMATURIA: ICD-10-CM

## 2019-06-14 DIAGNOSIS — N13.2 HYDRONEPHROSIS WITH URINARY OBSTRUCTION DUE TO URETERAL CALCULUS: ICD-10-CM

## 2019-06-14 LAB — BACTERIA SPEC CULT: ABNORMAL

## 2019-06-20 ENCOUNTER — OFFICE VISIT - HEALTHEAST (OUTPATIENT)
Dept: UROLOGY | Facility: CLINIC | Age: 69
End: 2019-06-20

## 2019-06-20 ENCOUNTER — HOSPITAL ENCOUNTER (OUTPATIENT)
Dept: CT IMAGING | Facility: CLINIC | Age: 69
Discharge: HOME | End: 2019-06-20
Attending: PHYSICIAN ASSISTANT

## 2019-06-20 DIAGNOSIS — N20.1 CALCULUS OF URETER: ICD-10-CM

## 2019-06-20 DIAGNOSIS — N20.0 NEPHROLITHIASIS, URIC ACID: ICD-10-CM

## 2019-06-20 DIAGNOSIS — N13.2 HYDRONEPHROSIS WITH URINARY OBSTRUCTION DUE TO URETERAL CALCULUS: ICD-10-CM

## 2019-06-20 LAB
ALBUMIN UR-MCNC: NEGATIVE MG/DL
APPEARANCE UR: CLEAR
BILIRUB UR QL STRIP: NEGATIVE
COLOR UR AUTO: YELLOW
GLUCOSE UR STRIP-MCNC: NEGATIVE MG/DL
HGB UR QL STRIP: ABNORMAL
KETONES UR STRIP-MCNC: NEGATIVE MG/DL
LEUKOCYTE ESTERASE UR QL STRIP: NEGATIVE
NITRATE UR QL: NEGATIVE
PH UR STRIP: 5.5 [PH] (ref 5–8)
SP GR UR STRIP: <=1.005 (ref 1–1.03)
UROBILINOGEN UR STRIP-ACNC: ABNORMAL

## 2019-09-05 ENCOUNTER — OFFICE VISIT (OUTPATIENT)
Dept: OPTOMETRY | Facility: CLINIC | Age: 69
End: 2019-09-05
Payer: MEDICARE

## 2019-09-05 ENCOUNTER — OFFICE VISIT (OUTPATIENT)
Dept: FAMILY MEDICINE | Facility: CLINIC | Age: 69
End: 2019-09-05
Payer: MEDICARE

## 2019-09-05 VITALS
OXYGEN SATURATION: 96 % | TEMPERATURE: 98 F | DIASTOLIC BLOOD PRESSURE: 84 MMHG | BODY MASS INDEX: 36.31 KG/M2 | RESPIRATION RATE: 16 BRPM | SYSTOLIC BLOOD PRESSURE: 134 MMHG | WEIGHT: 173 LBS | HEIGHT: 58 IN | HEART RATE: 89 BPM

## 2019-09-05 DIAGNOSIS — H53.9 VISUAL DISTURBANCE: Primary | ICD-10-CM

## 2019-09-05 DIAGNOSIS — I10 HYPERTENSION GOAL BP (BLOOD PRESSURE) < 140/90: ICD-10-CM

## 2019-09-05 DIAGNOSIS — R80.9 TYPE 2 DIABETES MELLITUS WITH MICROALBUMINURIA, WITHOUT LONG-TERM CURRENT USE OF INSULIN (H): ICD-10-CM

## 2019-09-05 DIAGNOSIS — E11.29 TYPE 2 DIABETES MELLITUS WITH MICROALBUMINURIA, WITHOUT LONG-TERM CURRENT USE OF INSULIN (H): ICD-10-CM

## 2019-09-05 DIAGNOSIS — E78.5 HYPERLIPIDEMIA LDL GOAL <100: ICD-10-CM

## 2019-09-05 DIAGNOSIS — I10 ESSENTIAL HYPERTENSION WITH GOAL BLOOD PRESSURE LESS THAN 140/90: ICD-10-CM

## 2019-09-05 DIAGNOSIS — E78.5 HYPERLIPIDEMIA LDL GOAL <130: ICD-10-CM

## 2019-09-05 DIAGNOSIS — E11.9 TYPE 2 DIABETES MELLITUS WITHOUT COMPLICATION, WITHOUT LONG-TERM CURRENT USE OF INSULIN (H): ICD-10-CM

## 2019-09-05 DIAGNOSIS — H25.813 COMBINED FORMS OF AGE-RELATED CATARACT OF BOTH EYES: Primary | ICD-10-CM

## 2019-09-05 LAB
ALT SERPL W P-5'-P-CCNC: 27 U/L (ref 0–50)
ANION GAP SERPL CALCULATED.3IONS-SCNC: 7 MMOL/L (ref 3–14)
BUN SERPL-MCNC: 21 MG/DL (ref 7–30)
CALCIUM SERPL-MCNC: 9.1 MG/DL (ref 8.5–10.1)
CHLORIDE SERPL-SCNC: 108 MMOL/L (ref 94–109)
CHOLEST SERPL-MCNC: 221 MG/DL
CO2 SERPL-SCNC: 24 MMOL/L (ref 20–32)
CREAT SERPL-MCNC: 0.95 MG/DL (ref 0.52–1.04)
GFR SERPL CREATININE-BSD FRML MDRD: 62 ML/MIN/{1.73_M2}
GLUCOSE BLD-MCNC: 138 MG/DL (ref 70–99)
GLUCOSE SERPL-MCNC: 150 MG/DL (ref 70–99)
HBA1C MFR BLD: 6.3 % (ref 0–5.6)
HDLC SERPL-MCNC: 50 MG/DL
LDLC SERPL CALC-MCNC: 127 MG/DL
NONHDLC SERPL-MCNC: 171 MG/DL
POTASSIUM SERPL-SCNC: 3.8 MMOL/L (ref 3.4–5.3)
SODIUM SERPL-SCNC: 139 MMOL/L (ref 133–144)
TRIGL SERPL-MCNC: 218 MG/DL

## 2019-09-05 PROCEDURE — 82947 ASSAY GLUCOSE BLOOD QUANT: CPT | Performed by: FAMILY MEDICINE

## 2019-09-05 PROCEDURE — 99214 OFFICE O/P EST MOD 30 MIN: CPT | Performed by: FAMILY MEDICINE

## 2019-09-05 PROCEDURE — 83036 HEMOGLOBIN GLYCOSYLATED A1C: CPT | Performed by: FAMILY MEDICINE

## 2019-09-05 PROCEDURE — 80061 LIPID PANEL: CPT | Performed by: FAMILY MEDICINE

## 2019-09-05 PROCEDURE — 36415 COLL VENOUS BLD VENIPUNCTURE: CPT | Performed by: FAMILY MEDICINE

## 2019-09-05 PROCEDURE — 80048 BASIC METABOLIC PNL TOTAL CA: CPT | Performed by: FAMILY MEDICINE

## 2019-09-05 PROCEDURE — 92004 COMPRE OPH EXAM NEW PT 1/>: CPT | Performed by: OPTOMETRIST

## 2019-09-05 PROCEDURE — 84460 ALANINE AMINO (ALT) (SGPT): CPT | Performed by: FAMILY MEDICINE

## 2019-09-05 RX ORDER — POTASSIUM CITRATE 10 MEQ/1
3 TABLET, EXTENDED RELEASE ORAL 2 TIMES DAILY
COMMUNITY
Start: 2019-06-03 | End: 2020-03-09

## 2019-09-05 RX ORDER — LOVASTATIN 40 MG
40 TABLET ORAL AT BEDTIME
Qty: 90 TABLET | Refills: 0 | Status: SHIPPED | OUTPATIENT
Start: 2019-09-05 | End: 2019-11-04

## 2019-09-05 RX ORDER — LOSARTAN POTASSIUM 25 MG/1
25 TABLET ORAL DAILY
Qty: 90 TABLET | Refills: 0 | Status: SHIPPED | OUTPATIENT
Start: 2019-09-05 | End: 2019-11-04

## 2019-09-05 ASSESSMENT — CONF VISUAL FIELD
OD_NORMAL: 1
OS_NORMAL: 1

## 2019-09-05 ASSESSMENT — MIFFLIN-ST. JEOR: SCORE: 1209.47

## 2019-09-05 ASSESSMENT — EXTERNAL EXAM - RIGHT EYE: OD_EXAM: NORMAL

## 2019-09-05 ASSESSMENT — CUP TO DISC RATIO
OS_RATIO: 0.3
OD_RATIO: 0.3

## 2019-09-05 ASSESSMENT — TONOMETRY
IOP_METHOD: TONOPEN
OS_IOP_MMHG: 23
OD_IOP_MMHG: 21
OS_IOP_MMHG: 23
IOP_METHOD: TONOPEN

## 2019-09-05 ASSESSMENT — VISUAL ACUITY
OS_SC: 20/70
METHOD: SNELLEN - LINEAR
OD_SC: 20/70
OS_SC: 20/400
OS_PH_SC+: -1
OD_PH_SC: 20/50
OS_PH_SC: 20/40
OD_SC: 20/400

## 2019-09-05 ASSESSMENT — PAIN SCALES - GENERAL: PAINLEVEL: NO PAIN (0)

## 2019-09-05 ASSESSMENT — SLIT LAMP EXAM - LIDS
COMMENTS: DERMATOCHALASIS
COMMENTS: DERMATOCHALASIS

## 2019-09-05 ASSESSMENT — PATIENT HEALTH QUESTIONNAIRE - PHQ9: SUM OF ALL RESPONSES TO PHQ QUESTIONS 1-9: 7

## 2019-09-05 ASSESSMENT — EXTERNAL EXAM - LEFT EYE: OS_EXAM: NORMAL

## 2019-09-05 NOTE — TELEPHONE ENCOUNTER
Dr. Snachez verbally told this writer to send in qty 90 refill of Mevacor and Losartan to [facility] pharmacy, now. Patient is waiting.     Appears other RN was working on refill request at same time and sent in the Mevacor for qty 90. This RN will send in qty 90 for Losartan, now.     Haleigh Santana RN

## 2019-09-05 NOTE — LETTER
9/5/2019         RE: Amalia Harris  [address]  [place] [address]        Dear Colleague,    Thank you for referring your patient, Amalia Harris, to the [facility]. Please see a copy of my visit note below.    Chief Complaint   Patient presents with     Vision Changes Ou     Referred from Dr. Sanchez in primary care.     Hemoglobin A1C   Date Value Ref Range Status   09/05/2019 6.3 (H) 0 - 5.6 % Final     Comment:     Normal <5.7% Prediabetes 5.7-6.4%  Diabetes 6.5% or higher - adopted from ADA   consensus guidelines.       Referred by Dr. Sanchez-he had concerns about sudden change in vision.      She noticed the vision changes about a month ago. She has blurry vision both far and near.     Medical, surgical and family histories reviewed and updated 9/5/2019.       OBJECTIVE: See Ophthalmology exam    ASSESSMENT:    ICD-10-CM    1. Combined forms of age-related cataract of both eyes H25.813 OPHTHALMOLOGY ADULT REFERRAL   2. Type 2 diabetes mellitus without complication, without long-term current use of insulin (H) E11.9       PLAN:     Patient Instructions   Referral to [facility] ophthalmology for cataract evaluation.    There are not any signs of the diabetes affecting the eyes today.  It is important that you get your eyes dilated once yearly and keep good control of your diabetes.    Don Ortiz OD           Again, thank you for allowing me to participate in the care of your patient.        Sincerely,        Don Ortiz OD

## 2019-09-05 NOTE — PATIENT INSTRUCTIONS
At [facility], we strive to deliver an exceptional experience to you, every time we see you.  If you receive a survey in the mail, please send us back your thoughts. We really do value your feedback.    Based on your medical history, these are the current health maintenance/preventive care services that you are due for (some may have been done at this visit.)  Health Maintenance Due   Topic Date Due     DEXA  1950     HF ACTION PLAN  1950     DIABETIC FOOT EXAM  1950     EYE EXAM  1950     ZOSTER IMMUNIZATION (1 of 2) 04/06/2002     PNEUMOCOCCAL IMMUNIZATION 65+ LOW/MEDIUM RISK (2 of 2 - PPSV23) 11/15/2018     A1C  06/17/2019     BMP  06/17/2019     PHQ-9  08/21/2019     INFLUENZA VACCINE (1) 09/01/2019     FALL RISK ASSESSMENT  09/12/2019     CBC  09/12/2019         Suggested websites for health information:  Www.[facility] : Up to date and easily searchable information on multiple topics.  Www.medlineplus.gov : medication info, interactive tutorials, watch real surgeries online  Www.familydoctor.org : good info from the Academy of Family Physicians  Www.cdc.gov : public health info, travel advisories, epidemics (H1N1)  Www.aap.org : children's health info, normal development, vaccinations  Www.health.[facility].[place].us : [place] dept of health, public health issues in [place], N1N1    Your care team:                            Family Medicine Internal Medicine   MD Gonzalo Palomino MD Shantel Branch-Fleming, MD Katya Georgiev PA-C Nam Ho, MD Pediatrics   NARCISO Esposito, MD Kiersten Gonzalez CNP, MD Deborah Mielke, MD Kim Thein, APRN CNP      Clinic hours: Monday - Thursday 7 am-7 pm; Fridays 7 am-5 pm.   Urgent care: Monday - Friday 11 am-9 pm; Saturday and Sunday 9 am-5 pm.  Pharmacy : Monday -Thursday 8 am-8 pm; Friday 8 am-6 pm; Saturday and Sunday 9 am-5 pm.     Clinic: [phone]    Pharmacy: [phone]    Patient Education     What Are Cataracts?    A clear lens in the eye focuses light. This lets the eye see images sharply. Aging is the most common cause of cataracts. With age, the lens slowly becomes cloudy. The cloudy lens is a cataract. A cataract scatters light and makes it hard for the eye to focus. Cataracts often form in both eyes. But one lens may cloud faster than the other.  The aging of your lens  Your lens may cloud so slowly that you don`t notice any vision changes at first. But as the cataract gets worse, the eye has a harder time focusing. In early stages, glasses may help you see better. As the lens gets more cloudy, your healthcare provider may recommend surgery to restore your vision.  A clear lens allows your eye to bring objects sharply into focus.  A mild cataract may slightly blur your vision.  A dense cataract can severely blur your vision.  Date Last Reviewed: 11/1/2017 2000-2018 [employer]. [address] [place] [address]. All rights reserved. This information is not intended as a substitute for professional medical care. Always follow your healthcare professional's instructions.           Patient Education     What Is Glaucoma?    Glaucoma is an eye disease that can cause blindness. If caught early, it can usually be controlled. But it often has no symptoms, so you need regular eye exams. Glaucoma usually begins when pressure builds up in the eye. This pressure can damage the optic nerve. The optic nerve sends messages to the brain so you can see. There are two main kinds of glaucoma: open-angle and closed-angle.  Drainage area  The eye is always producing fluid. The eye's drainage areas may become clogged or blocked. Too much fluid stays in the eye. This increases eye pressure.  Optic nerve  Too much pressure in the eye can damage the optic nerve. If damaged, this nerve cannot send the messages to the brain that let you  see.  Open-angle glaucoma  Open-angle is the most common kind of glaucoma. It happens slowly as people age. The drainage area in the eye becomes clogged. Not enough fluid drains from the eye, so pressure slowly builds up. This causes gradual loss of side (peripheral) vision. You may not even notice changes until much of your vision is lost.  Closed-angle glaucoma  Closed-angle glaucoma is less common than open-angle. It usually comes on quickly. The drainage area in the eye suddenly becomes completely blocked. Eye pressure builds rapidly. You may notice blurred vision and rainbow halos around lights. You may also have headaches, nausea, vomiting, and severe pain. If not treated right away, blindness can happen quickly.  Date Last Reviewed: 10/1/2017    5097-6868 The "[employer]. [address] [place] [address], [place] [id number]. All rights reserved. This information is not intended as a substitute for professional medical care. Always follow your healthcare professional's instructions.           Patient Education     What Is Age-Related Macular Degeneration (AMD)?    Age-related macular degeneration (AMD) is an eye disease. AMD is the leading cause of vision loss in adults over age 50. It almost always affects both eyes. The macula (the part of the eye that controls your central, detailed vision) becomes damaged. Central vision becomes limited. However, side vision remains clear. There are two types of macular degeneration: dry and wet.     Dry macular degeneration       Wet macular degeneration      Dry macular degeneration  Dry is the most common type of macular degeneration. In the early stages, changes in vision may be hard to notice. Over time, your central vision may slowly worsen. You may notice wavy lines and blank spots in the center of your vision. Colors may look dim. There is no way to restore vision lost from dry macular degeneration. But you need to monitor it because it can turn into wet macular  degeneration.  Wet macular degeneration  Wet macular degeneration is less common, but more serious. Vision loss may be faster and more noticeable. You may suddenly see dark spots, blank spots, wavy lines, and dim colors in the center of your vision. If wet macular degeneration is caught early, certain treatments, such as injections, photodynamic therapy, or laser surgery, may help to slow further vision loss or even improve vision.  Date Last Reviewed: 10/1/2017    3311-6999 The [employer]. [address] [place] [address], [place] [address]. All rights reserved. This information is not intended as a substitute for professional medical care. Always follow your healthcare professional's instructions.

## 2019-09-05 NOTE — TELEPHONE ENCOUNTER
.Reason for Call:  Other prescription    Detailed comments: patient is still in the clinic, wondering if  can fill her medication and send it to the [facility]. 1.lovastatin (MEVACOR) 40 MG tablet and 2.losartan (COZAAR) 25 MG tablet    Phone Number Patient can be reached at: Cell number on file:    Telephone Information:   [place] [phone]       Best Time: any    Can we leave a detailed message on this number? YES    Call taken on 9/5/2019 at 12:29 PM by Juan A Cassidy

## 2019-09-05 NOTE — TELEPHONE ENCOUNTER
For losartan:  Routing refill request to provider for review/approval because:  Labs not current:  Potassium    For Lovastatin:  Prescription approved per [facility] Refill Protocol.      Davonte Baumann RN, BSN, PHN

## 2019-09-05 NOTE — PATIENT INSTRUCTIONS
Referral to [facility] ophthalmology for cataract evaluation.    There are not any signs of the diabetes affecting the eyes today.  It is important that you get your eyes dilated once yearly and keep good control of your diabetes.    Don Ortiz, ROSSY    The affects of the dilating drops last for 4- 6 hours.  You will be more sensitive to light and vision will be blurry up close.  Mydriatic sunglasses were given if needed.      Patient Education   Diabetes weakens the blood vessels all over the body, including the eyes. Damage to the blood vessels in the eyes can cause swelling or bleeding into part of the eye (called the retina). This is called diabetic retinopathy (PAOLO-tin-AH-puh-thee). If not treated, this disease can cause vision loss or blindness.   Symptoms may include blurred or distorted vision, but many people have no symptoms. It's important to see your eye doctor regularly to check for problems.   Early treatment and good control can help protect your vision. Here are the things you can do to help prevent vision loss:      1. Keep your blood sugar levels under tight control.      2. Bring high blood pressure under control.      3. No smoking.      4. Have yearly dilated eye exams.         Optometry Providers       Clinic Locations                                 Telephone Number   Dr. Jackie C[facility] and [place]   [place] [phone]     [facility] Hours:                [facility] Hours:       [facility] Hours:   [address] [facility] [address]   [address]     [address] [place] [address]   [place] [address]    [place] [address]  Phone: [phone]                    Phone: [phone]     Phone: [phone]                      Monday 8:00-7:00                          Monday 8:00-7:00                          Monday 8:00-7:00              Tuesday 8:00-6:00                          Tuesday  8:00-7:00                          Tuesday 8:00-7:00              Wednesday 8:00-6:00                  Wednesday 8:00-7:00                   Wednesday 8:00-7:00      Thursday 8:00-6:00                        Thursday 8:00-7:00                         Thursday 8:00-7:00            Friday 8:00-5:00                              Friday 8:00-5:00                              Friday 8:00-5:00    [facility] Hours:   [address] [place] [address]  [phone]    Monday 8:00-7:00  Tuesday 8:00-7:00  Wednesday 8:00-7:00  Thursday 8:00-7:00  Friday 8:00-5:00  Please log on to [email or web address] to order your contact lenses.  The link is found on the [facility] Care [facility] Vision Services page.  As always, Thank you for trusting us with your health care needs!

## 2019-09-05 NOTE — LETTER
September 6, 2019      Amalia Harris  [address] [address]        Ms. Harris,     Your LDL (bad cholesterol) was above goal.  Genetics, diet, weight and low exercise levels can contribute to this.   Your HDL (good cholesterol) was below my goal for you (>45 in men and > 55 in women).  Genetics and inactivity can contribute to this.   Your triglycerides were above normal.  Poor diet, diabetes, genetics and being overweight can contribute to this.     Elevated LDL cholesterol and triglycerides as well as low HDL cholesterol all increase a person's risk for heart and vascular disease. Maintaining a healthy diet with lean proteins, whole grains and healthy fats such as olive oil as well as regular exercise and maintaining an appropriate weight all contribute to healthier cholesterol levels. It is also important for you to take your lovastatin every day.     Your HgbA1c was within the goal range for your diabetes (less than 7.0%) and it has improved since last check. You should recheck your HgbA1c in 6 months.     All of the rest of your test results were within the expected range for you.     Please contact the clinic if you have additional questions.  Thank you.     Sincerely,       Terry Sanchez MD     Resulted Orders   Hemoglobin A1c   Result Value Ref Range    Hemoglobin A1C 6.3 (H) 0 - 5.6 %      Comment:      Normal <5.7% Prediabetes 5.7-6.4%  Diabetes 6.5% or higher - adopted from ADA   consensus guidelines.     Glucose whole blood   Result Value Ref Range    Glucose Whole Blood 138 (H) 70 - 99 mg/dL   Lipid panel reflex to direct LDL Non-fasting   Result Value Ref Range    Cholesterol 221 (H) <200 mg/dL      Comment:      Desirable:       <200 mg/dl    Triglycerides 218 (H) <150 mg/dL      Comment:      Borderline high:  150-199 mg/dl  High:             200-499 mg/dl  Very high:       >499 mg/dl      HDL Cholesterol 50 >49 mg/dL    LDL Cholesterol Calculated 127 (H) <100 mg/dL      Comment:       Above desirable:  100-129 mg/dl  Borderline High:  130-159 mg/dL  High:             160-189 mg/dL  Very high:       >189 mg/dl      Non HDL Cholesterol 171 (H) <130 mg/dL      Comment:      Above Desirable:  130-159 mg/dl  Borderline high:  160-189 mg/dl  High:             190-219 mg/dl  Very high:       >219 mg/dl     Basic metabolic panel   Result Value Ref Range    Sodium 139 133 - 144 mmol/L    Potassium 3.8 3.4 - 5.3 mmol/L    Chloride 108 94 - 109 mmol/L    Carbon Dioxide 24 20 - 32 mmol/L    Anion Gap 7 3 - 14 mmol/L    Glucose 150 (H) 70 - 99 mg/dL    Urea Nitrogen 21 7 - 30 mg/dL    Creatinine 0.95 0.52 - 1.04 mg/dL    GFR Estimate 62 >60 mL/min/[1.73_m2]      Comment:      Non  GFR Calc  Starting 12/18/2018, serum creatinine based estimated GFR (eGFR) will be   calculated using the Chronic Kidney Disease Epidemiology Collaboration   (CKD-EPI) equation.      GFR Estimate If Black 72 >60 mL/min/[1.73_m2]      Comment:       GFR Calc  Starting 12/18/2018, serum creatinine based estimated GFR (eGFR) will be   calculated using the Chronic Kidney Disease Epidemiology Collaboration   (CKD-EPI) equation.      Calcium 9.1 8.5 - 10.1 mg/dL   ALT   Result Value Ref Range    ALT 27 0 - 50 U/L

## 2019-09-05 NOTE — TELEPHONE ENCOUNTER
Provider - see below- this has been taken care of.   No further action needed.  Haleigh Santana RN

## 2019-09-05 NOTE — PROGRESS NOTES
Chief Complaint   Patient presents with     Vision Changes Ou     Referred from Dr. Sanchez in primary care.     Hemoglobin A1C   Date Value Ref Range Status   09/05/2019 6.3 (H) 0 - 5.6 % Final     Comment:     Normal <5.7% Prediabetes 5.7-6.4%  Diabetes 6.5% or higher - adopted from ADA   consensus guidelines.       Referred by Dr. Sanchez-he had concerns about sudden change in vision.      She noticed the vision changes about a month ago. She has blurry vision both far and near.     Medical, surgical and family histories reviewed and updated 9/5/2019.       OBJECTIVE: See Ophthalmology exam    ASSESSMENT:    ICD-10-CM    1. Combined forms of age-related cataract of both eyes H25.813 OPHTHALMOLOGY ADULT REFERRAL   2. Type 2 diabetes mellitus without complication, without long-term current use of insulin (H) E11.9       PLAN:     Patient Instructions   Referral to [facility] ophthalmology for cataract evaluation.    There are not any signs of the diabetes affecting the eyes today.  It is important that you get your eyes dilated once yearly and keep good control of your diabetes.    Don Ortiz, OD

## 2019-09-05 NOTE — PROGRESS NOTES
"Subjective     Amalia Harris is a 69 year old female who presents to clinic today for the following health issues. She is accompanied by her     HPI   Concern - Vision changes     When did it start?: noted this about 45 days after starting a new medication (potassium citrate)    Any strain/injury, other illness, or event to trigger this problem?   no    Previous history of similar problem:   no      Location:           Began in one eye and seemed to progress to the other,     Describe it:   Cloudy, hard to see self in mirror or faces even a few feet away, eye occasionally feels \"sticky\" and uses eye drops    Severity: variable      Progression of symptoms from onset until now:  worsening gradually      Accompanying Signs & Symptoms:  None    What have you noticed that tends to make this worse?     None      What have you noticed that tends to make this better?     None      What have you done (this time) to try to treat this problem yourself?     None      Did it help?     no         She has an upcoming eye appointment here at [facility] scheduled for 09/23.       Diabetes Follow-up      How often are you checking your blood sugar? Not at all    What time of day are you checking your blood sugars (select all that apply)? None.    Have you had any blood sugars above 200?  NA    Have you had any blood sugars below 70?  NA    What symptoms do you notice when your blood sugar is low?  None    What concerns do you have today about your diabetes? None     Do you have any of these symptoms? (Select all that apply)  No numbness or tingling in feet.  No redness, sores or blisters on feet.  No complaints of excessive thirst.  No reports of blurry vision.  No significant changes to weight.     Have you had a diabetic eye exam in the last 12 months? No appt scheduled for 9/23      Hyperlipidemia Follow-Up      Are you having any of the following symptoms? (Select all that apply)  No complaints of shortness of breath, " "chest pain or pressure.  No increased sweating or nausea with activity.  No left-sided neck or arm pain.  No complaints of pain in calves when walking 1-2 blocks.    Are you regularly taking any medication or supplement to lower your cholesterol?   Yes- lovastatin    Are you having muscle aches or other side effects that you think could be caused by your cholesterol lowering medication?  No    Hypertension Follow-up       Do you check your blood pressure regularly outside of the clinic? No     Are you following a low salt diet? Yes    Are your blood pressures ever more than 140 on the top number (systolic) OR more   than 90 on the bottom number (diastolic), for example 140/90? No    BP Readings from Last 2 Encounters:   09/05/19 134/84   12/10/18 136/89     Hemoglobin A1C (%)   Date Value   09/05/2019 6.3 (H)   12/17/2018 6.8 (H)     LDL Cholesterol Calculated (mg/dL)   Date Value   09/05/2019 127 (H)   12/17/2018 168 (H)         How many servings of fruits and vegetables do you eat daily?  2-3    On average, how many sweetened beverages do you drink each day (soda, juice, sweet tea, etc)?   0    How many days per week do you miss taking your medication? 0      Past medical, family, and social histories, medications, and allergies are reviewed and updated in [facility].       Review of Systems   ROS COMP: Constitutional, HEENT, cardiovascular, pulmonary, gi and gu systems are negative, except as otherwise noted.    This document serves as a record of the services and decisions personally performed and made by Terry Sanchez MD. It was created on his behalf by Yan Stafford, a trained medical scribe. The creation of this document is based on the provider's statements to the medical scribe.  Yan Stafford 10:33 AM September 5, 2019      Objective    /84   Pulse 89   Temp 98  F (36.7  C) (Oral)   Resp 16   Ht 1.473 m (4' 10\")   Wt 78.5 kg (173 lb)   SpO2 96%   BMI 36.16 kg/m    Body mass index is 36.16 " kg/m .  Physical Exam   GENERAL: healthy, alert and no distress  EYES: Eyes grossly normal to inspection, PERRL, EOMI, sclerae white and conjunctivae normal, pterygium on the right, and bilateral cataracts present  RESP: lungs clear to auscultation - no crackles or wheezes, no areas of dullness, no tachypnea  CV: Heart regular rate and rhythm without murmur, click or rub. No peripheral edema and peripheral pulses strong  MS: no gross musculoskeletal defects noted, no edema  SKIN: no suspicious lesions or rashes to visible skin   NEURO: Normal strength and tone, sensory exam grossly normal, mentation intact, oriented times 3 and cranial nerves 2-12 intact  PSYCH: mentation appears normal, affect normal/bright     Lab Results   Component Value Date    A1C 6.3 09/05/2019    A1C 6.8 12/17/2018    A1C 6.4 05/25/2016            Assessment & Plan     (H53.9) Visual disturbance  (primary encounter diagnosis)  Comment: acute changes over the last couple months. Cataracts noted on exam, but potential for macular degeneration or glaucoma. I discussed case with Optometry and they recommended a same-day appointment.  Plan: OPTOMETRY REFERRAL(today)        Follow-up with Optometry. Thank you, Dr. Ortiz.    (E11.29,  R80.9) Type 2 diabetes mellitus with microalbuminuria, without long-term current use of insulin (H)  Comment: well controlled with diet and exercise  Plan: Hemoglobin A1c, Glucose whole blood, OPTOMETRY         REFERRAL         Return in about 6 months (around 3/5/2020) for full physical, diabetes.    (E78.5) Hyperlipidemia LDL goal <100  Comment: lab update for future refills  Plan: Lipid panel reflex to direct LDL Non-fasting,         ALT    (I10) Essential hypertension with goal blood pressure less than 140/90  Comment: lab update for future refills, borderline controlled   Plan: Basic metabolic panel        She may need her potassium citrate dose adjusted if today's potassium level is too high.     The information  in this document, created by the medical scribe for me, accurately reflects the services I personally performed and the decisions made by me. I have reviewed and approved this document for accuracy prior to leaving the patient care area.  September 5, 2019 10:40 AM    Terry Sanchez MD  [facility]

## 2019-09-05 NOTE — TELEPHONE ENCOUNTER
"Requested Prescriptions   Pending Prescriptions Disp Refills     lovastatin (MEVACOR) 40 MG tablet 90 tablet 1     Sig: Take 1 tablet (40 mg) by mouth At Bedtime Needs to be seen for more.       Statins Protocol Passed - 9/5/2019  1:07 PM        Passed - LDL on file in past 12 months     Recent Labs   Lab Test 12/17/18  0859   *             Passed - No abnormal creatine kinase in past 12 months                 Passed - Recent (12 mo) or future (30 days) visit within the authorizing provider's specialty     Patient had office visit in the last 12 months or has a visit in the next 30 days with authorizing provider or within the authorizing provider's specialty.  See \"Patient Info\" tab in inbasket, or \"Choose Columns\" in Meds & Orders section of the refill encounter.              Passed - Medication is active on med list        Passed - Patient is age 18 or older        Passed - No active pregnancy on record        Passed - No positive pregnancy test in past 12 months        losartan (COZAAR) 25 MG tablet 90 tablet 3     Sig: Take 1 tablet (25 mg) by mouth daily       Angiotensin-II Receptors Failed - 9/5/2019  1:07 PM        Failed - Normal serum potassium on file in past 12 months     Recent Labs   Lab Test 12/17/18  0859   POTASSIUM 3.2*                    Passed - Last blood pressure under 140/90 in past 12 months     BP Readings from Last 3 Encounters:   09/05/19 134/84   12/10/18 136/89   09/12/18 116/80                 Passed - Recent (12 mo) or future (30 days) visit within the authorizing provider's specialty     Patient had office visit in the last 12 months or has a visit in the next 30 days with authorizing provider or within the authorizing provider's specialty.  See \"Patient Info\" tab in inbasket, or \"Choose Columns\" in Meds & Orders section of the refill encounter.              Passed - Medication is active on med list        Passed - Patient is age 18 or older        Passed - No active pregnancy " on record        Passed - Normal serum creatinine on file in past 12 months     Recent Labs   Lab Test 12/17/18  0859   CR 0.80             Passed - No positive pregnancy test in past 12 months

## 2019-09-05 NOTE — Clinical Note
She did not have diabetic retinopathy.  The type of cataracts that she has can definitely cause more of a sudden change in vision.  She will be scheduled with ophthalmology for a cataract evaluation.  Thanks for sending her over.Don

## 2019-09-09 ENCOUNTER — OFFICE VISIT (OUTPATIENT)
Dept: OPHTHALMOLOGY | Facility: CLINIC | Age: 69
End: 2019-09-09
Attending: OPTOMETRIST
Payer: MEDICARE

## 2019-09-09 DIAGNOSIS — H25.813 COMBINED FORM OF AGE-RELATED CATARACT, BOTH EYES: Primary | ICD-10-CM

## 2019-09-09 PROCEDURE — 92004 COMPRE OPH EXAM NEW PT 1/>: CPT | Performed by: STUDENT IN AN ORGANIZED HEALTH CARE EDUCATION/TRAINING PROGRAM

## 2019-09-09 ASSESSMENT — REFRACTION_WEARINGRX
OS_SPHERE: -1.00
SPECS_TYPE: SVL
OD_AXIS: 045
OD_SPHERE: -1.00
OD_CYLINDER: +0.50
OS_AXIS: 010
OS_CYLINDER: +0.50

## 2019-09-09 ASSESSMENT — CUP TO DISC RATIO
OD_RATIO: 0.3
OS_RATIO: 0.4

## 2019-09-09 ASSESSMENT — VISUAL ACUITY
OS_CC: 20/50-1
METHOD: SNELLEN - LINEAR
CORRECTION_TYPE: GLASSES
OD_CC: 20/70

## 2019-09-09 ASSESSMENT — TONOMETRY
OD_IOP_MMHG: 18
IOP_METHOD: APPLANATION
OS_IOP_MMHG: 20

## 2019-09-09 ASSESSMENT — SLIT LAMP EXAM - LIDS
COMMENTS: 2+ DERMATOCHALASIS
COMMENTS: 2+ DERMATOCHALASIS

## 2019-09-09 ASSESSMENT — REFRACTION_MANIFEST
OS_CYLINDER: +0.50
OS_SPHERE: -1.50
OS_AXIS: 005

## 2019-09-09 ASSESSMENT — EXTERNAL EXAM - RIGHT EYE: OD_EXAM: NORMAL

## 2019-09-09 ASSESSMENT — EXTERNAL EXAM - LEFT EYE: OS_EXAM: NORMAL

## 2019-09-09 NOTE — PATIENT INSTRUCTIONS
Offered cataract surgery right eye, then left eye at [facility]   Call Malik to schedule at [phone]    Robel Clancy MD  [phone]

## 2019-09-09 NOTE — PROGRESS NOTES
Current Eye Medications:  no     Subjective: Cataract evaluation/Dr. Ortiz referral  Pt reports that has noticed for about the past month and one half that her vision has become blurrier in both her eyes.    Lab Results   Component Value Date    A1C 6.3 09/05/2019    A1C 6.8 12/17/2018    A1C 6.4 05/25/2016        Objective:  See Ophthalmology Exam.      Assessment:  Amalia Harris is a 69 year old female who presents with:     Combined form of age-related cataract, both eyes Visually significant both eyes. Dil 6.5, DM2,  in OR, ok for MG.  Needs BAT left eye at cataract pre-op.     Visually significant cataract that is interfering with daily activities of living. Plan for cataract extraction and intraocular lens implant right eye, then left eye.  Risks, benefits, complications, and alternatives discussed with patient including possibility of limitations from coexistent eye disease and loss of vision. Target refraction and lens options discussed.  Patient understands and wishes to proceed with surgery.     Plan:  Offered cataract surgery right eye, then left eye at [facility]   Call Malik to schedule at [phone]    Robel Clancy MD  [phone]

## 2019-09-09 NOTE — LETTER
9/9/2019         RE: Amalia Harris  [address] [address] [address]        Dear Colleague,    Thank you for referring your patient, Amalia Harris, to the [facility]. Please see a copy of my visit note below.     Current Eye Medications:  no     Subjective: Cataract evaluation/Dr. Ortiz referral  Pt reports that has noticed for about the past month and one half that her vision has become blurrier in both her eyes.    Lab Results   Component Value Date    A1C 6.3 09/05/2019    A1C 6.8 12/17/2018    A1C 6.4 05/25/2016        Objective:  See Ophthalmology Exam.      Assessment:  Amalia Harris is a 69 year old female who presents with:     Combined form of age-related cataract, both eyes Visually significant both eyes. Dil 6.5, DM2,  in OR, ok for MG.  Needs BAT left eye at cataract pre-op.     Visually significant cataract that is interfering with daily activities of living. Plan for cataract extraction and intraocular lens implant right eye, then left eye.  Risks, benefits, complications, and alternatives discussed with patient including possibility of limitations from coexistent eye disease and loss of vision. Target refraction and lens options discussed.  Patient understands and wishes to proceed with surgery.     Plan:  Offered cataract surgery right eye, then left eye at [facility]   Call Malik to schedule at [phone]    Robel Clancy MD  [phone]             Again, thank you for allowing me to participate in the care of your patient.        Sincerely,        Robel Clancy MD

## 2019-09-13 ENCOUNTER — COMMUNICATION - HEALTHEAST (OUTPATIENT)
Dept: UROLOGY | Facility: CLINIC | Age: 69
End: 2019-09-13

## 2019-09-13 ENCOUNTER — TELEPHONE (OUTPATIENT)
Dept: OPHTHALMOLOGY | Facility: CLINIC | Age: 69
End: 2019-09-13

## 2019-09-13 DIAGNOSIS — N20.0 NEPHROLITHIASIS, URIC ACID: ICD-10-CM

## 2019-09-13 NOTE — TELEPHONE ENCOUNTER
Type of surgery: Cataract Extraction with Intraocular Lens Implant Right Eye  CPT 57140  Combined form of age-related cataract, both eyes [H25.813]  - Primary   Location of surgery: [facility]  Date and time of surgery: 11/11/2019 @ 7:30am  Surgeon: Dr. Clancy  Pre-Op Appt Date: 11/04/2019  Post-Op Appt Date: 11/12/2019   Packet sent out: Yes  Pre-cert/Authorization completed:  No prior auth needed for Medicare  Date: 09/16/2019     Thank you,   Stefanie Donahue   [facility] Department  [phone]

## 2019-09-13 NOTE — TELEPHONE ENCOUNTER
Voicemail left on surgery scheduler line requesting a call back to assist in scheduling mother's cataract surgery. Number provided is out of service. Will attempt to reach patient with an  to get a valid number.     
04-Jul-2018 18:02

## 2019-09-13 NOTE — TELEPHONE ENCOUNTER
Type of surgery: Cataract Extraction with Intraocular Lens Implant Left Eye  CPT 28805  Combined form of age-related cataract, both eyes [H25.813]  - Primary   Location of surgery: [facility]  Date and time of surgery: 11/25/2019 @ 7:30am  Surgeon: Dr. Clancy  Pre-Op Appt Date: 11/04/2019  Post-Op Appt Date: 11/26/2019   Packet sent out: Yes  Pre-cert/Authorization completed:  No prior auth needed for Medicare  Date: 09/16/2019    Thank you,   Stefanie Donahue   [facility] Department  [phone]

## 2019-10-21 ENCOUNTER — OFFICE VISIT (OUTPATIENT)
Dept: OPHTHALMOLOGY | Facility: CLINIC | Age: 69
End: 2019-10-21
Payer: MEDICARE

## 2019-10-21 DIAGNOSIS — H25.813 COMBINED FORM OF AGE-RELATED CATARACT, BOTH EYES: Primary | ICD-10-CM

## 2019-10-21 PROCEDURE — 92012 INTRM OPH EXAM EST PATIENT: CPT | Performed by: STUDENT IN AN ORGANIZED HEALTH CARE EDUCATION/TRAINING PROGRAM

## 2019-10-21 PROCEDURE — 92136 OPHTHALMIC BIOMETRY: CPT | Mod: 26 | Performed by: STUDENT IN AN ORGANIZED HEALTH CARE EDUCATION/TRAINING PROGRAM

## 2019-10-21 RX ORDER — PREDNISOLONE ACETATE 10 MG/ML
1 SUSPENSION/ DROPS OPHTHALMIC 4 TIMES DAILY
Qty: 5 ML | Refills: 0 | Status: SHIPPED | OUTPATIENT
Start: 2019-11-12 | End: 2019-11-26

## 2019-10-21 RX ORDER — TETRAHYDROZOLINE HCL 0.05 %
1 DROPS OPHTHALMIC (EYE) PRN
COMMUNITY
End: 2021-08-02

## 2019-10-21 RX ORDER — MOXIFLOXACIN 5 MG/ML
1 SOLUTION/ DROPS OPHTHALMIC 4 TIMES DAILY
Qty: 3 ML | Refills: 0 | Status: SHIPPED | OUTPATIENT
Start: 2019-11-10 | End: 2019-12-23

## 2019-10-21 RX ORDER — KETOROLAC TROMETHAMINE 5 MG/ML
1 SOLUTION OPHTHALMIC 4 TIMES DAILY
Qty: 5 ML | Refills: 0 | Status: SHIPPED | OUTPATIENT
Start: 2019-11-10 | End: 2019-12-23

## 2019-10-21 ASSESSMENT — EXTERNAL EXAM - LEFT EYE: OS_EXAM: NORMAL

## 2019-10-21 ASSESSMENT — SLIT LAMP EXAM - LIDS
COMMENTS: 2+ DERMATOCHALASIS
COMMENTS: 2+ DERMATOCHALASIS

## 2019-10-21 ASSESSMENT — TONOMETRY
OD_IOP_MMHG: 20
IOP_METHOD: APPLANATION

## 2019-10-21 ASSESSMENT — VISUAL ACUITY
METHOD: SNELLEN - LINEAR
OD_SC: 20/70

## 2019-10-21 ASSESSMENT — EXTERNAL EXAM - RIGHT EYE: OD_EXAM: NORMAL

## 2019-10-21 NOTE — PATIENT INSTRUCTIONS
PRE-OP CATARACT INSTRUCTIONS    ** 3 eye drops from the pharmacy at least 3 days before surgery.    *Use the following drops in the right eye 4 times on the day before surgery and once the morning of surgery:                                   Vigamox or Ofloxacin (tan cap)              Ketorolac      **We will start the prednisolone (white or pink top) drops AFTER surgery.    *Please bring all your eyedrops to the surgery center.    *If taking more than one drop, wait five minutes between drops.    *No solid food or drink after midnight. Please take the starred medications with a small sip of water the morning of surgery.    *If you are diabetic, please do not take any diabetic medications the morning of surgery.    Robel Clancy MD  [phone]

## 2019-10-21 NOTE — LETTER
10/21/2019         RE: Amalia Harris  [address]  [place] [address]        Dear Colleague,    Thank you for referring your patient, Amalia Harris, to the [facility]. Please see a copy of my visit note below.     Current Eye Medications:  Visine both eyes prn     Subjective:  Here for Preop right eye. Would like a copy of her appointments as her son gives her rides but doesn't tell her when her appointments are. Chart states she has Diabetes, but she denies and doesn't take any meds for this.      Objective:  See Ophthalmology Exam.       Assessment:  Amalia Harris is a 69 year old female who presents with:   Encounter Diagnosis   Name Primary?     Combined form of age-related cataract, both eyes Pre-op right eye. MG case, 3 drops,  in OR, dil 6.5, likely Trypan foe sense Posterior subcapsular cataract (PSC). Target mostly distance right eye.        Plan:  PRE-OP CATARACT INSTRUCTIONS    ** 3 eye drops from the pharmacy at least 3 days before surgery.    *Use the following drops in the right eye 4 times on the day before surgery and once the morning of surgery:                                   Vigamox or Ofloxacin (tan cap)              Ketorolac      **We will start the prednisolone (white or pink top) drops AFTER surgery.    *Please bring all your eyedrops to the surgery center.    *If taking more than one drop, wait five minutes between drops.    *No solid food or drink after midnight. Please take the starred medications with a small sip of water the morning of surgery.    *If you are diabetic, please do not take any diabetic medications the morning of surgery.    Robel Clancy MD  [phone]          Again, thank you for allowing me to participate in the care of your patient.        Sincerely,        Robel Clancy MD

## 2019-10-21 NOTE — PROGRESS NOTES
Current Eye Medications:  Visine both eyes prn     Subjective:  Here for Preop right eye. Would like a copy of her appointments as her son gives her rides but doesn't tell her when her appointments are. Chart states she has Diabetes, but she denies and doesn't take any meds for this.      Objective:  See Ophthalmology Exam.       Assessment:  Amalia Harris is a 69 year old female who presents with:   Encounter Diagnosis   Name Primary?     Combined form of age-related cataract, both eyes Pre-op right eye. MG case, 3 drops,  in OR, dil 6.5, likely Trypan foe sense Posterior subcapsular cataract (PSC). Target mostly distance right eye.        Plan:  PRE-OP CATARACT INSTRUCTIONS    ** 3 eye drops from the pharmacy at least 3 days before surgery.    *Use the following drops in the right eye 4 times on the day before surgery and once the morning of surgery:                                   Vigamox or Ofloxacin (tan cap)              Ketorolac      **We will start the prednisolone (white or pink top) drops AFTER surgery.    *Please bring all your eyedrops to the surgery center.    *If taking more than one drop, wait five minutes between drops.    *No solid food or drink after midnight. Please take the starred medications with a small sip of water the morning of surgery.    *If you are diabetic, please do not take any diabetic medications the morning of surgery.    Robel Clancy MD  [phone]

## 2019-10-21 NOTE — PROGRESS NOTES
Current Eye Medications: Visine prn      Subjective:  preop kpe right eye  Scheduled on 11/11/2019     Objective:  See Ophthalmology Exam.       Assessment:      Plan:   See Patient Instructions.

## 2019-11-04 ENCOUNTER — OFFICE VISIT (OUTPATIENT)
Dept: FAMILY MEDICINE | Facility: CLINIC | Age: 69
End: 2019-11-04
Payer: MEDICARE

## 2019-11-04 VITALS
RESPIRATION RATE: 16 BRPM | HEIGHT: 58 IN | TEMPERATURE: 98.2 F | BODY MASS INDEX: 37.36 KG/M2 | WEIGHT: 178 LBS | OXYGEN SATURATION: 97 % | HEART RATE: 91 BPM | SYSTOLIC BLOOD PRESSURE: 133 MMHG | DIASTOLIC BLOOD PRESSURE: 75 MMHG

## 2019-11-04 DIAGNOSIS — Z01.818 PREOP GENERAL PHYSICAL EXAM: Primary | ICD-10-CM

## 2019-11-04 DIAGNOSIS — E11.29 TYPE 2 DIABETES MELLITUS WITH MICROALBUMINURIA, WITHOUT LONG-TERM CURRENT USE OF INSULIN (H): ICD-10-CM

## 2019-11-04 DIAGNOSIS — H25.813 COMBINED FORMS OF AGE-RELATED CATARACT OF BOTH EYES: ICD-10-CM

## 2019-11-04 DIAGNOSIS — E78.5 HYPERLIPIDEMIA LDL GOAL <100: ICD-10-CM

## 2019-11-04 DIAGNOSIS — Z23 NEED FOR VACCINATION FOR STREP PNEUMONIAE: ICD-10-CM

## 2019-11-04 DIAGNOSIS — R80.9 TYPE 2 DIABETES MELLITUS WITH MICROALBUMINURIA, WITHOUT LONG-TERM CURRENT USE OF INSULIN (H): ICD-10-CM

## 2019-11-04 DIAGNOSIS — R01.1 UNDIAGNOSED CARDIAC MURMURS: ICD-10-CM

## 2019-11-04 DIAGNOSIS — Z23 NEED FOR IMMUNIZATION AGAINST INFLUENZA: ICD-10-CM

## 2019-11-04 DIAGNOSIS — I11.0 BENIGN HYPERTENSIVE HEART DISEASE WITH CONGESTIVE HEART FAILURE (H): ICD-10-CM

## 2019-11-04 LAB
CREAT SERPL-MCNC: 0.89 MG/DL (ref 0.52–1.04)
ERYTHROCYTE [DISTWIDTH] IN BLOOD BY AUTOMATED COUNT: 13.4 % (ref 10–15)
GFR SERPL CREATININE-BSD FRML MDRD: 67 ML/MIN/{1.73_M2}
GLUCOSE SERPL-MCNC: 132 MG/DL (ref 70–99)
HCT VFR BLD AUTO: 38.5 % (ref 35–47)
HGB BLD-MCNC: 12.6 G/DL (ref 11.7–15.7)
MCH RBC QN AUTO: 30.4 PG (ref 26.5–33)
MCHC RBC AUTO-ENTMCNC: 32.7 G/DL (ref 31.5–36.5)
MCV RBC AUTO: 93 FL (ref 78–100)
PLATELET # BLD AUTO: 251 10E9/L (ref 150–450)
POTASSIUM SERPL-SCNC: 4.1 MMOL/L (ref 3.4–5.3)
RBC # BLD AUTO: 4.14 10E12/L (ref 3.8–5.2)
WBC # BLD AUTO: 7.8 10E9/L (ref 4–11)

## 2019-11-04 PROCEDURE — 82947 ASSAY GLUCOSE BLOOD QUANT: CPT | Performed by: FAMILY MEDICINE

## 2019-11-04 PROCEDURE — 84132 ASSAY OF SERUM POTASSIUM: CPT | Performed by: FAMILY MEDICINE

## 2019-11-04 PROCEDURE — 36415 COLL VENOUS BLD VENIPUNCTURE: CPT | Performed by: FAMILY MEDICINE

## 2019-11-04 PROCEDURE — 99214 OFFICE O/P EST MOD 30 MIN: CPT | Mod: 25 | Performed by: FAMILY MEDICINE

## 2019-11-04 PROCEDURE — 90732 PPSV23 VACC 2 YRS+ SUBQ/IM: CPT | Performed by: FAMILY MEDICINE

## 2019-11-04 PROCEDURE — 85027 COMPLETE CBC AUTOMATED: CPT | Performed by: FAMILY MEDICINE

## 2019-11-04 PROCEDURE — G0009 ADMIN PNEUMOCOCCAL VACCINE: HCPCS | Performed by: FAMILY MEDICINE

## 2019-11-04 PROCEDURE — 90662 IIV NO PRSV INCREASED AG IM: CPT | Performed by: FAMILY MEDICINE

## 2019-11-04 PROCEDURE — 82565 ASSAY OF CREATININE: CPT | Performed by: FAMILY MEDICINE

## 2019-11-04 PROCEDURE — G0008 ADMIN INFLUENZA VIRUS VAC: HCPCS | Performed by: FAMILY MEDICINE

## 2019-11-04 PROCEDURE — 93000 ELECTROCARDIOGRAM COMPLETE: CPT | Performed by: FAMILY MEDICINE

## 2019-11-04 RX ORDER — LOVASTATIN 40 MG
40 TABLET ORAL AT BEDTIME
Qty: 90 TABLET | Refills: 1 | Status: SHIPPED | OUTPATIENT
Start: 2019-11-04 | End: 2020-03-09

## 2019-11-04 RX ORDER — LOSARTAN POTASSIUM 25 MG/1
25 TABLET ORAL DAILY
Qty: 90 TABLET | Refills: 1 | Status: SHIPPED | OUTPATIENT
Start: 2019-11-04 | End: 2020-03-09

## 2019-11-04 ASSESSMENT — MIFFLIN-ST. JEOR: SCORE: 1232.15

## 2019-11-04 ASSESSMENT — PAIN SCALES - GENERAL: PAINLEVEL: NO PAIN (0)

## 2019-11-04 NOTE — PATIENT INSTRUCTIONS
At [facility], we strive to deliver an exceptional experience to you, every time we see you.  If you receive a survey in the mail, please send us back your thoughts. We really do value your feedback.    Based on your medical history, these are the current health maintenance/preventive care services that you are due for (some may have been done at this visit.)  Health Maintenance Due   Topic Date Due     DEXA  1950     HF ACTION PLAN  1950     DIABETIC FOOT EXAM  1950     ZOSTER IMMUNIZATION (1 of 2) 04/06/2002     PNEUMOCOCCAL IMMUNIZATION 65+ LOW/MEDIUM RISK (2 of 2 - PPSV23) 11/15/2018     INFLUENZA VACCINE (1) 09/01/2019     CBC  09/12/2019         Suggested websites for health information:  Www.Limos.com.PlaceIQ : Up to date and easily searchable information on multiple topics.  Www.Money360.gov : medication info, interactive tutorials, watch real surgeries online  Www.familydoctor.org : good info from the Academy of Family Physicians  Www.cdc.gov : public health info, travel advisories, epidemics (H1N1)  Www.aap.org : children's health info, normal development, vaccinations  Www.health.[facility].[place].us : [place] dept of health, public health issues in [place], N1N1    Your care team:                            Family Medicine Internal Medicine   MD Gonzalo Palomino MD Shantel Branch-Fleming, MD Katya Georgiev PA-C Nam Ho, MD Pediatrics   Isaiah Garcia PAMERI George, MD Kiersten Gonzalez CNP, MD Deborah Mielke, MD Kim Thein, APRN CNP      Clinic hours: Monday - Thursday 7 am-7 pm; Fridays 7 am-5 pm.   Urgent care: Monday - Friday 11 am-9 pm; Saturday and Sunday 9 am-5 pm.  Pharmacy : Monday -Thursday 8 am-8 pm; Friday 8 am-6 pm; Saturday and Sunday 9 am-5 pm.     Clinic: [phone]   Pharmacy: [phone]    Before Your Surgery      Call your surgeon if there is any change in your health. This includes signs of  a cold or flu (such as a sore throat, runny nose, cough, rash or fever).    Do not smoke, drink alcohol or take over the counter medicine (unless your surgeon or primary care doctor tells you to) for the 24 hours before and after surgery.    If you take prescribed drugs: Follow your doctor s orders about which medicines to take and which to stop until after surgery.    Eating and drinking prior to surgery: follow the instructions from your surgeon    Take a shower or bath the night before surgery. Use the soap your surgeon gave you to gently clean your skin. If you do not have soap from your surgeon, use your regular soap. Do not shave or scrub the surgery site.  Wear clean pajamas and have clean sheets on your bed.     Please call your clinic of choice to schedule your echocardiogram (heart ultrasound):    [facility]:   [phone]  [facility]:   [phone]  [facility]:  [phone]  [facility]): [phone]  [facility]:   [phone]  [facility]:   [phone]  [facility]):  [phone]  [facility] [phone]   Patient Education     Understanding a Heart Murmur    The heart makes sounds as it beats. These sounds occur as the heart valves open and close to allow blood to flow through the heart. A heart murmur is an extra noise heard during a heartbeat. The noise is caused when blood does not flow smoothly through the heart. Heart murmurs can be innocent (harmless) or abnormal (caused by a heart problem).  What causes a heart murmur?  An innocent heart murmur can be a normal finding for many people. It may also be caused by:    Fever    Exercise    Pregnancy    Anemia    Overactive thyroid gland  An abnormal heart murmur can be caused by heart problems such as:    A damaged or diseased valve. The valve may be too narrow for blood to flow through easily. Or it may have problems opening or closing, and may leak blood backward.    A hole in  the heart (septal defect). This is a problem with the heart s structure that a person is born with (congenital). It causes blood to leak through the wall that normally divides the left and right sides of the heart.  What are the symptoms of a heart murmur?  Heart murmurs do not usually cause symptoms. They tend to be found when your healthcare provider is listening to your heart for another reason. People with an abnormal heart murmur may have symptoms of the problem causing the murmur. Symptoms can include:    Feeling weak or tired    Shortness of breath, especially with exercise    Chest pain    Fast, pounding, or skipping heartbeat    Swollen ankles, feet, abdomen    Feeling dizzy or faint    Poor feeding and failing to grow normally (babies only)  How is a heart murmur treated?  An innocent heart murmur does not usually need treatment unless there is a clear cause, such as anemia. In such cases, treating the underlying cause should cure the murmur. In some cases, an innocent heart murmur may go away on its own.  Treatment for an abnormal heart murmur depends on the cause. Options may include:    Medicines to help relieve symptoms    Procedures or surgery to fix or replace a diseased or damaged heart valve    Procedures or surgery to fix a hole in the heart  What are the complications of a heart murmur?  An innocent heart murmur has no complications. Complications of an abnormal heart murmur will vary depending on the cause. Possible complications include:    Heart failure. This problem occurs when the heart is so weak it no longer pumps blood well.    Infection of the heart s valves or inner lining (infective endocarditis)    Blood clots and stroke    Fainting    Heart attack    Sudden cardiac arrest. This problem occurs when the heart suddenly stops beating.  When should I call my healthcare provider?  Call your healthcare provider right away if you have any of these:    Chest pain    Shortness of  breath    Fever of 100.4 F (38 C) or higher, or as directed    Symptoms that don t get better with treatment, or symptoms that get worse    New symptoms   Date Last Reviewed: 5/1/2016 2000-2018 [employer]. [address] [place] [address], [place] [address]. All rights reserved. This information is not intended as a substitute for professional medical care. Always follow your healthcare professional's instructions.

## 2019-11-04 NOTE — LETTER
November 7, 2019      Amalia Harris  [address] [address] [address]        Ms. Harris,     All of your test results were normal or within the usual/expected range for you.     Please contact the clinic if you have additional questions.  Thank you.     Sincerely,       Terry Sanchez MD     Resulted Orders   CBC with Platelets     Result Value Ref Range    WBC 7.8 4.0 - 11.0 10e9/L    RBC Count 4.14 3.8 - 5.2 10e12/L    Hemoglobin 12.6 11.7 - 15.7 g/dL    Hematocrit 38.5 35.0 - 47.0 %    MCV 93 78 - 100 fl    MCH 30.4 26.5 - 33.0 pg    MCHC 32.7 31.5 - 36.5 g/dL    RDW 13.4 10.0 - 15.0 %    Platelet Count 251 150 - 450 10e9/L   Glucose   Result Value Ref Range    Glucose 132 (H) 70 - 99 mg/dL      Comment:      Non Fasting   Potassium   Result Value Ref Range    Potassium 4.1 3.4 - 5.3 mmol/L   Creatinine   Result Value Ref Range    Creatinine 0.89 0.52 - 1.04 mg/dL    GFR Estimate 67 >60 mL/min/[1.73_m2]      Comment:      Non  GFR Calc  Starting 12/18/2018, serum creatinine based estimated GFR (eGFR) will be   calculated using the Chronic Kidney Disease Epidemiology Collaboration   (CKD-EPI) equation.      GFR Estimate If Black 78 >60 mL/min/[1.73_m2]      Comment:       GFR Calc  Starting 12/18/2018, serum creatinine based estimated GFR (eGFR) will be   calculated using the Chronic Kidney Disease Epidemiology Collaboration   (CKD-EPI) equation.

## 2019-11-04 NOTE — PROGRESS NOTES
[address] [address]  [phone]  Dept: [phone]    PRE-OP EVALUATION:  Today's date: 2019    Amalia Harris (: 1950) presents for pre-operative evaluation assessment as requested by Dr. Clancy.  She requires evaluation and anesthesia risk assessment prior to undergoing surgery/procedure for treatment of cataracts.    Proposed Surgery/ Procedure: RIGHT/LEFT PHACOEMULSIFICATION, CATARACT, WITH STANDARD IOL INSERTION  Date of Surgery/ Procedure: 2019 and 2019  Time of Surgery/ Procedure: In the    Hospital/Surgical Facility: [facility]   Primary Physician: Terry Sanchez  Type of Anesthesia Anticipated: Local with MAC    Patient has a Health Care Directive or Living Will:  YES     1. NO - Do you have a history of heart attack, stroke, stent, bypass or surgery on an artery in the head, neck, heart or legs?  2. NO - Do you ever have any pain or discomfort in your chest?  3. NO - Do you have a history of  Heart Failure?  4. YES - ARE YOUR TROUBLED BY SHORTNESS OF BREATH WHEN WALKING ON THE LEVEL, UP A SLIGHT HILL OR AT NIGHT? When she goes up the stairs, but can complete 3 flights of stairs  5. NO - Do you currently have a cold, bronchitis or other respiratory infection?  6. NO - Do you have a cough, shortness of breath or wheezing?  7. NO - Do you sometimes get pains in the calves of your legs when you walk?  8. NO - Do you or anyone in your family have previous history of blood clots?  9. NO - Do you or does anyone in your family have a serious bleeding problem such as prolonged bleeding following surgeries or cuts?  10. NO - Have you ever had problems with anemia or been told to take iron pills?  11. NO - Have you had any abnormal blood loss such as black, tarry or bloody stools, or abnormal vaginal bleeding?  12. NO - Have you ever had a blood transfusion?  13. NO - Have you or any of your relatives ever had  problems with anesthesia?  14. NO - Do you have sleep apnea, excessive snoring or daytime drowsiness?  15. NO - Do you have any prosthetic heart valves?  16. NO - Do you have prosthetic joints?  17. NO - Is there any chance that you may be pregnant?      HPI:     HPI related to upcoming procedure: This 69 year old female complains of decreased visual acuity, which was first noted about around 7/18/19, and she was noted to have cataracts on her exam on 9/5/19.       See problem list for active medical problems.  Problems all longstanding and stable, except as noted/documented.  See ROS for pertinent symptoms related to these conditions.      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     Priority: Medium     Low urine output 12/14/2017     Priority: Medium     Urinary tract stones 12/14/2017     Priority: Medium     Essential hypertension with goal blood pressure less than 140/90 12/12/2017     Priority: Medium     CHF (congestive heart failure) (H) 09/27/2017     Priority: Medium     Uric acid nephrolithiasis 09/26/2017     Priority: Medium     Type 2 diabetes mellitus with microalbuminuria, without long-term current use of insulin (H) 05/27/2016     Priority: Medium     Advance care planning 05/11/2016     Priority: Medium     Advance Care Planning 5/11/2016: Patient declined to discuss at this time. Nalini Mccray MA         Intertriginous candidiasis 05/11/2016     Priority: Medium     Severe obesity (BMI 35.0-39.9) with comorbidity (H) 08/27/2014     Priority: Medium     HTN, pre-diabetes/diabetes       Hyperlipidemia LDL goal <100 03/23/2011     Priority: Medium     Major depression 03/23/2011     Priority: Medium     Osteoarthritis 03/23/2011     Priority: Medium      Past Medical History:   Diagnosis Date     Guaiac positive stools 6/6/2016      Hyperlipidemia LDL goal <130 3/23/2011     Hypertension      Major depression 3/23/2011     Nonsenile cataract      Osteoarthritis 3/23/2011     Past Surgical History:   Procedure Laterality Date     GALLBLADDER SURGERY  1986     Current Outpatient Medications   Medication Sig Dispense Refill     [START ON 11/10/2019] ketorolac (ACULAR) 0.5 % ophthalmic solution Place 1 drop into the right eye 4 times daily 5 mL 0     losartan (COZAAR) 25 MG tablet Take 1 tablet (25 mg) by mouth daily 90 tablet 1     lovastatin (MEVACOR) 40 MG tablet Take 1 tablet (40 mg) by mouth At Bedtime Needs to be seen for more. 90 tablet 1     [START ON 11/10/2019] moxifloxacin (VIGAMOX) 0.5 % ophthalmic solution Place 1 drop into the right eye 4 times daily 3 mL 0     potassium citrate (UROCIT-K) 10 MEQ (1080 MG) CR tablet 3 tablets 2 times daily for kidney stone prevention.       [START ON 11/12/2019] prednisoLONE acetate (PRED FORTE) 1 % ophthalmic suspension Place 1 drop into the right eye 4 times daily 5 mL 0     tetrahydrozoline (VISINE) 0.05 % ophthalmic solution Place 1 drop into both eyes as needed       OTC products: occasional Tylenol    Allergies   Allergen Reactions     No Clinical Screening - See Comments Rash     Tele patches      Latex Allergy: NO    Social History     Tobacco Use     Smoking status: Never Smoker     Smokeless tobacco: Never Used   Substance Use Topics     Alcohol use: No     Alcohol/week: 0.0 standard drinks     History   Drug Use No       REVIEW OF SYSTEMS:   CONSTITUTIONAL: NEGATIVE for fever, chills, change in weight  ENT/MOUTH: NEGATIVE for ear, mouth and throat problems  RESP: NEGATIVE for significant cough or SOB  CV: NEGATIVE for chest pain, palpitations or peripheral edema    This document serves as a record of the services and decisions personally performed and made by Dr. Sanchez. It was created on his behalf by Katiuska Cruz, a trained medical scribe. The creation of this document is based the  "provider's statements to the medical scribe.  Katiuska Cruz,  11:50 AM     EXAM:   /75 (BP Location: Left arm, Patient Position: Chair, Cuff Size: Adult Large)   Pulse 91   Temp 98.2  F (36.8  C) (Oral)   Resp 16   Ht 1.473 m (4' 10\")   Wt 80.7 kg (178 lb)   SpO2 97%   BMI 37.20 kg/m         GENERAL APPEARANCE: healthy, alert and no distress     EYES: EOMI, PERRL, sclera white, no conjunctivitis, bilateral cataracts, and pterygium noted in the right eye     HENT: ear canals and TM's normal and nose and mouth without ulcers or lesions     NECK: no adenopathy, no asymmetry, masses, or scars and thyroid normal to palpation     RESP: lungs clear to auscultation - no rales, rhonchi or wheezes     CV: regular rates and rhythm, normal S1 S2, no S3 or S4. 3/6 systolic murmur noted, loudest along the left sternal border.     ABDOMEN:  soft, nontender, no HSM or masses and bowel sounds normal     MS: extremities normal- no gross deformities noted, no evidence of inflammation in joints, FROM in all extremities.     SKIN: no suspicious lesions or rashes to visible skin      NEURO: Normal strength and tone, sensory exam grossly normal, mentation intact and speech normal, DTRs symmetrical, cranial nerves 2-12 intact      PSYCH: mentation appears normal. and affect normal/bright     LYMPHATICS: No cervical adenopathy    DIAGNOSTICS:   EKG: appears normal, NSR, normal axis, normal intervals, no acute ST/T changes c/w ischemia, no LVH by voltage criteria, no significant change from 9/12/18    Recent Labs   Lab Test 09/05/19  0935 12/17/18  0859 09/12/18  1623   HGB  --   --  12.3   PLT  --   --  354    141 141   POTASSIUM 3.8 3.2* 4.3   CR 0.95 0.80 0.85   A1C 6.3* 6.8*  --         IMPRESSION:   Reason for surgery/procedure: visually significant cataracts  Diagnosis/reason for consult: Pre-op evaluation of fitness for surgery & proposed anesthesia     The proposed surgical procedure is considered LOW " risk.    REVISED CARDIAC RISK INDEX  The patient has the following serious cardiovascular risks for perioperative complications such as (MI, PE, VFib and 3  AV Block):  Congestive Heart Failure (pulmonary edema, PND, s3 parvez, CXR with pulmonary congestion, basilar rales)  INTERPRETATION: 1 risks: Class II (low risk - 0.9% complication rate)    The patient has the following additional risks for perioperative complications:  Diagnoses number 3,4,6 from the list below:       ICD-10-CM    1. Preop general physical exam Z01.818 EKG 12-lead complete w/read - Clinics   2. Combined forms of age-related cataract of both eyes H25.813    3. Benign hypertensive heart disease with congestive heart failure (H) I11.0 CBC with Platelets       EKG 12-lead complete w/read - Clinics     losartan (COZAAR) 25 MG tablet     Potassium     Creatinine   4. Type 2 diabetes mellitus with microalbuminuria, without long-term current use of insulin (H) E11.29 Glucose    R80.9    5. Hyperlipidemia LDL goal <100 E78.5 lovastatin (MEVACOR) 40 MG tablet   6. Undiagnosed cardiac murmurs R01.1 Echocardiogram Complete   7. Need for vaccination for Strep pneumoniae Z23 PNEUMOCOCCAL VACCINE,ADULT,SQ OR IM     ADMIN PNEUMOCOCCAL VACCINE   8. Need for immunization against influenza Z23 INFLUENZA (HIGH DOSE) 3 VALENT VACCINE [31141]     ADMIN INFLUENZA VIRUS VAC     RECOMMENDATIONS:     I recommend an echocardiogram, and possible cardiology referral, but this does not preclude having cataract surgery.    APPROVAL GIVEN to proceed with proposed procedure, without further diagnostic evaluation       Signed Electronically by: Terry Sanchez MD    Copy of this evaluation report is provided to requesting physician.    [facility] Preop Guidelines    Revised Cardiac Risk Index

## 2019-11-04 NOTE — NURSING NOTE
Prior to immunization administration, verified patients identity using patient s name and date of birth. Please see Immunization Activity for additional information.     Screening Questionnaire for Adult Immunization    Are you sick today?   No   Do you have allergies to medications, food, a vaccine component or latex?   No   Have you ever had a serious reaction after receiving a vaccination?   No   Do you have a long-term health problem with heart disease, lung disease, asthma, kidney disease, metabolic disease (e.g. diabetes), anemia, or other blood disorder?   No   Do you have cancer, leukemia, HIV/AIDS, or any other immune system problem?   No   In the past 3 months, have you taken medications that affect  your immune system, such as prednisone, other steroids, or anticancer drugs; drugs for the treatment of rheumatoid arthritis, Crohn s disease, or psoriasis; or have you had radiation treatments?   No   Have you had a seizure, or a brain or other nervous system problem?   No   During the past year, have you received a transfusion of blood or blood     products, or been given immune (gamma) globulin or antiviral drug?   No   For women: Are you pregnant or is there a chance you could become        pregnant during the next month?   No   Have you received any vaccinations in the past 4 weeks?   No     Immunization questionnaire answers were all negative.        Per orders of Dr. Sanchez, injection of [id number] and HD Flu given by Yon Conner MA. Patient instructed to remain in clinic for 15 minutes afterwards, and to report any adverse reaction to me immediately.       Screening performed by Yon Conner MA on 11/4/2019 at 12:39 PM.

## 2019-11-08 ENCOUNTER — ANESTHESIA EVENT (OUTPATIENT)
Dept: SURGERY | Facility: AMBULATORY SURGERY CENTER | Age: 69
End: 2019-11-08

## 2019-11-11 ENCOUNTER — ANESTHESIA (OUTPATIENT)
Dept: SURGERY | Facility: AMBULATORY SURGERY CENTER | Age: 69
End: 2019-11-11
Payer: MEDICARE

## 2019-11-11 ENCOUNTER — HOSPITAL ENCOUNTER (OUTPATIENT)
Facility: AMBULATORY SURGERY CENTER | Age: 69
Discharge: HOME | End: 2019-11-11
Attending: STUDENT IN AN ORGANIZED HEALTH CARE EDUCATION/TRAINING PROGRAM | Admitting: STUDENT IN AN ORGANIZED HEALTH CARE EDUCATION/TRAINING PROGRAM
Payer: MEDICARE

## 2019-11-11 VITALS
HEART RATE: 68 BPM | DIASTOLIC BLOOD PRESSURE: 58 MMHG | SYSTOLIC BLOOD PRESSURE: 128 MMHG | TEMPERATURE: 97.8 F | OXYGEN SATURATION: 98 % | RESPIRATION RATE: 16 BRPM

## 2019-11-11 PROCEDURE — G8907 PT DOC NO EVENTS ON DISCHARG: HCPCS

## 2019-11-11 PROCEDURE — 66984 XCAPSL CTRC RMVL W/O ECP: CPT | Mod: RT | Performed by: STUDENT IN AN ORGANIZED HEALTH CARE EDUCATION/TRAINING PROGRAM

## 2019-11-11 PROCEDURE — 66984 XCAPSL CTRC RMVL W/O ECP: CPT | Mod: RT

## 2019-11-11 PROCEDURE — G8918 PT W/O PREOP ORDER IV AB PRO: HCPCS

## 2019-11-11 DEVICE — EYE IMP IOL AMO PCL TECNIS ZCB00 21.5: Type: IMPLANTABLE DEVICE | Status: FUNCTIONAL

## 2019-11-11 RX ORDER — SODIUM CHLORIDE, SODIUM LACTATE, POTASSIUM CHLORIDE, CALCIUM CHLORIDE 600; 310; 30; 20 MG/100ML; MG/100ML; MG/100ML; MG/100ML
500 INJECTION, SOLUTION INTRAVENOUS CONTINUOUS
Status: DISCONTINUED | OUTPATIENT
Start: 2019-11-11 | End: 2019-11-12 | Stop reason: HOSPADM

## 2019-11-11 RX ORDER — LIDOCAINE 40 MG/G
CREAM TOPICAL
Status: DISCONTINUED | OUTPATIENT
Start: 2019-11-11 | End: 2019-11-12 | Stop reason: HOSPADM

## 2019-11-11 RX ORDER — CYCLOPENTOLATE HYDROCHLORIDE 10 MG/ML
1 SOLUTION/ DROPS OPHTHALMIC
Status: COMPLETED | OUTPATIENT
Start: 2019-11-11 | End: 2019-11-11

## 2019-11-11 RX ORDER — MEPERIDINE HYDROCHLORIDE 25 MG/ML
12.5 INJECTION INTRAMUSCULAR; INTRAVENOUS; SUBCUTANEOUS
Status: DISCONTINUED | OUTPATIENT
Start: 2019-11-11 | End: 2019-11-12 | Stop reason: HOSPADM

## 2019-11-11 RX ORDER — FENTANYL CITRATE 50 UG/ML
INJECTION, SOLUTION INTRAMUSCULAR; INTRAVENOUS PRN
Status: DISCONTINUED | OUTPATIENT
Start: 2019-11-11 | End: 2019-11-11

## 2019-11-11 RX ORDER — ONDANSETRON 4 MG/1
4 TABLET, ORALLY DISINTEGRATING ORAL EVERY 30 MIN PRN
Status: DISCONTINUED | OUTPATIENT
Start: 2019-11-11 | End: 2019-11-12 | Stop reason: HOSPADM

## 2019-11-11 RX ORDER — PHENYLEPHRINE HYDROCHLORIDE 25 MG/ML
1 SOLUTION/ DROPS OPHTHALMIC
Status: COMPLETED | OUTPATIENT
Start: 2019-11-11 | End: 2019-11-11

## 2019-11-11 RX ORDER — TROPICAMIDE 10 MG/ML
1 SOLUTION/ DROPS OPHTHALMIC
Status: COMPLETED | OUTPATIENT
Start: 2019-11-11 | End: 2019-11-11

## 2019-11-11 RX ORDER — SODIUM CHLORIDE, SODIUM LACTATE, POTASSIUM CHLORIDE, CALCIUM CHLORIDE 600; 310; 30; 20 MG/100ML; MG/100ML; MG/100ML; MG/100ML
INJECTION, SOLUTION INTRAVENOUS CONTINUOUS
Status: DISCONTINUED | OUTPATIENT
Start: 2019-11-11 | End: 2019-11-12 | Stop reason: HOSPADM

## 2019-11-11 RX ORDER — HYDROMORPHONE HYDROCHLORIDE 1 MG/ML
.3-.5 INJECTION, SOLUTION INTRAMUSCULAR; INTRAVENOUS; SUBCUTANEOUS EVERY 10 MIN PRN
Status: DISCONTINUED | OUTPATIENT
Start: 2019-11-11 | End: 2019-11-12 | Stop reason: HOSPADM

## 2019-11-11 RX ORDER — MOXIFLOXACIN 5 MG/ML
SOLUTION/ DROPS OPHTHALMIC PRN
Status: DISCONTINUED | OUTPATIENT
Start: 2019-11-11 | End: 2019-11-11 | Stop reason: HOSPADM

## 2019-11-11 RX ORDER — TETRACAINE HYDROCHLORIDE 5 MG/ML
SOLUTION OPHTHALMIC PRN
Status: DISCONTINUED | OUTPATIENT
Start: 2019-11-11 | End: 2019-11-11 | Stop reason: HOSPADM

## 2019-11-11 RX ORDER — OXYCODONE HYDROCHLORIDE 5 MG/1
5 TABLET ORAL EVERY 4 HOURS PRN
Status: DISCONTINUED | OUTPATIENT
Start: 2019-11-11 | End: 2019-11-12 | Stop reason: HOSPADM

## 2019-11-11 RX ORDER — ONDANSETRON 2 MG/ML
4 INJECTION INTRAMUSCULAR; INTRAVENOUS EVERY 30 MIN PRN
Status: DISCONTINUED | OUTPATIENT
Start: 2019-11-11 | End: 2019-11-12 | Stop reason: HOSPADM

## 2019-11-11 RX ORDER — TETRACAINE HYDROCHLORIDE 5 MG/ML
1-2 SOLUTION OPHTHALMIC ONCE
Status: COMPLETED | OUTPATIENT
Start: 2019-11-11 | End: 2019-11-11

## 2019-11-11 RX ORDER — FENTANYL CITRATE 50 UG/ML
25-50 INJECTION, SOLUTION INTRAMUSCULAR; INTRAVENOUS
Status: DISCONTINUED | OUTPATIENT
Start: 2019-11-11 | End: 2019-11-12 | Stop reason: HOSPADM

## 2019-11-11 RX ORDER — NALOXONE HYDROCHLORIDE 0.4 MG/ML
.1-.4 INJECTION, SOLUTION INTRAMUSCULAR; INTRAVENOUS; SUBCUTANEOUS
Status: DISCONTINUED | OUTPATIENT
Start: 2019-11-11 | End: 2019-11-12 | Stop reason: HOSPADM

## 2019-11-11 RX ADMIN — PHENYLEPHRINE HYDROCHLORIDE 1 DROP: 25 SOLUTION/ DROPS OPHTHALMIC at 06:24

## 2019-11-11 RX ADMIN — SODIUM CHLORIDE, SODIUM LACTATE, POTASSIUM CHLORIDE, CALCIUM CHLORIDE 500 ML: 600; 310; 30; 20 INJECTION, SOLUTION INTRAVENOUS at 06:26

## 2019-11-11 RX ADMIN — TROPICAMIDE 1 DROP: 10 SOLUTION/ DROPS OPHTHALMIC at 06:35

## 2019-11-11 RX ADMIN — TROPICAMIDE 1 DROP: 10 SOLUTION/ DROPS OPHTHALMIC at 06:29

## 2019-11-11 RX ADMIN — TROPICAMIDE 1 DROP: 10 SOLUTION/ DROPS OPHTHALMIC at 06:23

## 2019-11-11 RX ADMIN — CYCLOPENTOLATE HYDROCHLORIDE 1 DROP: 10 SOLUTION/ DROPS OPHTHALMIC at 06:29

## 2019-11-11 RX ADMIN — CYCLOPENTOLATE HYDROCHLORIDE 1 DROP: 10 SOLUTION/ DROPS OPHTHALMIC at 06:24

## 2019-11-11 RX ADMIN — PHENYLEPHRINE HYDROCHLORIDE 1 DROP: 25 SOLUTION/ DROPS OPHTHALMIC at 06:35

## 2019-11-11 RX ADMIN — FENTANYL CITRATE 25 MCG: 50 INJECTION, SOLUTION INTRAMUSCULAR; INTRAVENOUS at 07:24

## 2019-11-11 RX ADMIN — TETRACAINE HYDROCHLORIDE 2 DROP: 5 SOLUTION OPHTHALMIC at 06:22

## 2019-11-11 RX ADMIN — PHENYLEPHRINE HYDROCHLORIDE 1 DROP: 25 SOLUTION/ DROPS OPHTHALMIC at 06:29

## 2019-11-11 RX ADMIN — CYCLOPENTOLATE HYDROCHLORIDE 1 DROP: 10 SOLUTION/ DROPS OPHTHALMIC at 06:35

## 2019-11-11 NOTE — ANESTHESIA PREPROCEDURE EVALUATION
Anesthesia Pre-Procedure Evaluation    Patient: Amalia Harris   MRN:     [id number] Gender:   female   Age:    69 year old :      1950        Preoperative Diagnosis: RIGHT CATARACT   Procedure(s):  RIGHT PHACOEMULSIFICATION, CATARACT, WITH STANDARD IOL INSERTION     Past Medical History:   Diagnosis Date     Guaiac positive stools 2016     Hyperlipidemia LDL goal <130 3/23/2011     Hypertension      Major depression 3/23/2011     Nonsenile cataract      Osteoarthritis 3/23/2011      Past Surgical History:   Procedure Laterality Date     GALLBLADDER SURGERY            Anesthesia Evaluation     .             ROS/MED HX    ENT/Pulmonary:  - neg pulmonary ROS     Neurologic:  - neg neurologic ROS     Cardiovascular:  - neg cardiovascular ROS   (+) hypertension----. : . CHF . . :. .       METS/Exercise Tolerance:     Hematologic:  - neg hematologic  ROS       Musculoskeletal:  - neg musculoskeletal ROS (+) arthritis,  -       GI/Hepatic:  - neg GI/hepatic ROS       Renal/Genitourinary:  - ROS Renal section negative   (+) Nephrolithiasis ,       Endo:  - neg endo ROS   (+) type II DM Obesity, .      Psychiatric:  - neg psychiatric ROS   (+) psychiatric history depression      Infectious Disease:  - neg infectious disease ROS       Malignancy:      - no malignancy   Other:    - neg other ROS                     PHYSICAL EXAM:   Mental Status/Neuro: A/A/O   Airway: Facies: Feasible  Mallampati: II  Mouth/Opening: Full  TM distance: > 6 cm  Neck ROM: Full   Respiratory: Auscultation: CTAB     Resp. Rate: Normal     Resp. Effort: Normal      CV: Rhythm: Regular  Rate: Age appropriate  Heart: Normal Sounds  Edema: None   Comments:      Dental: Normal Dentition                LABS:  CBC:   Lab Results   Component Value Date    WBC 7.8 2019    WBC 14.0 (H) 2018    HGB 12.6 2019    HGB 12.3 2018    HCT 38.5 2019    HCT 38.1 2018     2019     2018     BMP:  "  Lab Results   Component Value Date     09/05/2019     12/17/2018    POTASSIUM 4.1 11/04/2019    POTASSIUM 3.8 09/05/2019    CHLORIDE 108 09/05/2019    CHLORIDE 107 12/17/2018    CO2 24 09/05/2019    CO2 27 12/17/2018    BUN 21 09/05/2019    BUN 20 12/17/2018    CR 0.89 11/04/2019    CR 0.95 09/05/2019     (H) 11/04/2019     (H) 09/05/2019     COAGS: No results found for: PTT, INR, FIBR  POC: No results found for: BGM, HCG, HCGS  OTHER:   Lab Results   Component Value Date    A1C 6.3 (H) 09/05/2019    DILLON 9.1 09/05/2019    ALBUMIN 3.3 (L) 12/17/2018    PROTTOTAL 7.7 12/17/2018    ALT 27 09/05/2019    AST 18 12/17/2018    ALKPHOS 81 12/17/2018    BILITOTAL 0.4 12/17/2018    TSH 2.09 09/12/2018        Preop Vitals    BP Readings from Last 3 Encounters:   11/11/19 131/72   11/04/19 133/75   09/05/19 134/84    Pulse Readings from Last 3 Encounters:   11/11/19 87   11/04/19 91   09/05/19 89      Resp Readings from Last 3 Encounters:   11/11/19 16   11/04/19 16   09/05/19 16    SpO2 Readings from Last 3 Encounters:   11/11/19 99%   11/04/19 97%   09/05/19 96%      Temp Readings from Last 1 Encounters:   11/11/19 36.4  C (97.5  F) (Temporal)    Ht Readings from Last 1 Encounters:   11/04/19 1.473 m (4' 10\")      Wt Readings from Last 1 Encounters:   11/04/19 80.7 kg (178 lb)    Estimated body mass index is 37.2 kg/m  as calculated from the following:    Height as of 11/4/19: 1.473 m (4' 10\").    Weight as of 11/4/19: 80.7 kg (178 lb).     LDA:  Peripheral IV 11/11/19 Left Hand (Active)   Number of days: 0        Assessment:   ASA SCORE: 3    H&P: History and physical reviewed and following examination; no interval change.   Smoking Status:  Non-Smoker/Unknown   NPO Status: NPO Appropriate     Plan:   Anes. Type:  MAC   Pre-Medication: None   Induction:  N/a   Airway: Native Airway   Access/Monitoring: PIV   Maintenance: N/a     Postop Plan:   Postop Pain: None  Postop Sedation/Airway: Not " planned  Disposition: Outpatient     PONV Management:  NO PONV Prophylaxis Required     CONSENT: Direct conversation   Plan and risks discussed with: Patient   Blood Products: Consent Deferred (Minimal Blood Loss)                   Joe Michaels MD

## 2019-11-11 NOTE — ANESTHESIA POSTPROCEDURE EVALUATION
Anesthesia POST Procedure Evaluation    Patient: Amalia Harris   MRN:     [id number] Gender:   female   Age:    69 year old :      1950        Preoperative Diagnosis: RIGHT CATARACT   Procedure(s):  RIGHT PHACOEMULSIFICATION, CATARACT, WITH STANDARD IOL INSERTION   Postop Comments: No value filed.       Anesthesia Type:  Not documented  MAC    Reportable Event: NO     PAIN: Uncomplicated   Sign Out status: Comfortable, Well controlled pain     PONV: No PONV   Sign Out status:  No Nausea or Vomiting     Neuro/Psych: Uneventful perioperative course   Sign Out Status: Preoperative baseline; Age appropriate mentation     Airway/Resp.: Uneventful perioperative course   Sign Out Status: Non labored breathing, age appropriate RR; Resp. Status within EXPECTED Parameters     CV: Uneventful perioperative course   Sign Out status: Appropriate BP and perfusion indices; Appropriate HR/Rhythm     Disposition:   Sign Out in:  PACU  Disposition:  Phase II; Home  Recovery Course: Uneventful  Follow-Up: Not required           Last Anesthesia Record Vitals:  CRNA VITALS  2019 0716 - 2019 0816      2019             Pulse:  70    SpO2:  99 %          Last PACU Vitals:  Vitals Value Taken Time   /63 2019  7:47 AM   Temp 36.2  C (97.2  F) 2019  7:47 AM   Pulse 74 2019  7:47 AM   Resp 16 2019  7:47 AM   SpO2 97 % 2019  7:47 AM   Temp src Skin 2019  7:45 AM   NIBP 113/64 2019  7:41 AM   Pulse 70 2019  7:46 AM   SpO2 99 % 2019  7:46 AM   Resp     Temp 36  C (96.8  F) 2019  7:45 AM   Ht Rate     Temp 2           Electronically Signed By: Joe Michaels MD, 2019, 3:26 PM

## 2019-11-11 NOTE — OP NOTE
PreOp Diagnosis: Visually significant nuclear sclerotic cataract right eye  PostOp Diagnosis: Same  Surgeon: Robel Clancy MD  Implant: Technis [id number]    Procedures:   1. Review of intraocular lens calculations, both eyes   2. Phacoemulsification and extraction of lens  right eye   3. Intraocular lens implantation right eye  Anesthesia: MAC/topical  Complications: None  EBL: <1cc    Amalia Harris suffers from a visually significant cataract of the right eye. This has caused problems with distance and reading vision, including glare. After discussing the risks, benefits, and alternatives, the patient wishes to proceed with cataract surgery.    The patient was identified in the pre-op area where the right eye was marked. The patient was then brought to the operating room where a time out was called, identifying the patient, the procedure, and the correct site. Tetracaine drops were applied to the operative eye. The operative eye was then prepped and draped in the usual sterile ophthalmic fashion. An eyelid speculum was placed into the operative eye. Additional tetracaine drops were applied. A paracentesis was made superior with a side port blade. 1% preservative-free lidocaine and epinephrine was injected into the anterior chamber.  Endocoat was injected to deepen the anterior chamber. A 2.4mm clear corneal wound was created with a keratome blade temporally. A continuous curvilinear capsulorrhexis was started with a bent cystitome and completed with Utrada forceps. Hydrodissection of the lens nucleus was performed with BSS on a cannula. The lens nucleus was rotated. Phacoemulsification of the lens nucleus was accomplished in a phacoemulsification stop and chop technique. Remaining cortex was removed with irrigation and aspiration. The lens capsule was noted to be intact. Healon was used to inflate the capsular bag.  The lens was injected into the capsular bag. Remaining viscoelastic was removed with irrigation and  aspiration. The wounds were checked and found to be watertight after hydration. The eyelid speculum was removed and Vigamox drops were placed into the operative eye. The patient tolerated the procedure well and was in stable condition on the way to the recovery area.     Robel Clancy MD

## 2019-11-11 NOTE — DISCHARGE INSTRUCTIONS
CATARACT SURGERY POST-OP INSTRUCTIONS  Dr. Robel Clancy  [phone]        Start using all three eye drops today, including   - Vigamox (tan top)   - Ketolorac (grey top)   - Prednisolone (white or pink top)    You should get 3 doses in today and 4 doses daily starting tomorrow.  Wait a few minutes in between putting each drop in.    OR, you may have been prescribed CatarActive3, which is a compounded drop containing all three of the medications in one bottle.   You should get 3 doses in today and 4 doses daily starting tomorrow.        Keep the eye shield taped in place unless putting drops in. We will remove it for you in the office tomorrow.      Light sensitivity may be noticed. Sunglasses may be worn for comfort.      Do not rub the operated eye.      Keep the operated eye dry. You may wash your hair, bathe or shower, but keep the operated eye closed while doing so.       No swimming, hot tub, or sauna for 2 weeks.      No make up around eye for 5 days.      No bending at the waist or lifting more than 10 pounds for one week.      May take Tylenol (per directions on bottle) for mild pain.      Call the office at [phone] and ask to speak to the on-call ophthalmologist  if any of the following should occur:  o Any sudden vision changes  o Nausea or severe headache  o Increase in pain not controlled  o Or signs of infection (pus, increasing redness or tenderness)    [facility]  Same-Day Surgery   Adult Discharge Orders & Instructions   For 24 hours after surgery  1. Get plenty of rest.  A responsible adult must stay with you for at least 24 hours after you leave the hospital.   2. Do not drive or use heavy equipment.  If you have weakness or tingling, don't drive or use heavy equipment until this feeling goes away.  3. Do not drink alcohol.  4. Avoid strenuous or risky activities.  Ask for help when climbing stairs.   5. You may feel lightheaded.  IF so, sit for a few minutes  before standing.  Have someone help you get up.   6. If you have nausea (feel sick to your stomach): Drink only clear liquids such as apple juice, ginger ale, broth or 7-Up.  Rest may also help.  Be sure to drink enough fluids.  Move to a regular diet as you feel able.  7. You may have a slight fever. Call the doctor if your fever is over 100 F (37.7 C) (taken under the tongue) or lasts longer than 24 hours.  8. You may have a dry mouth, a sore throat, muscle aches or trouble sleeping.  These should go away after 24 hours.  9. Do not make important or legal decisions.   Call your doctor for any of the followin.  Signs of infection (fever, growing tenderness at the surgery site, a large amount of drainage or bleeding, severe pain, foul-smelling drainage, redness, swelling).    2. It has been over 8 to 10 hours since surgery and you are still not able to urinate (pass water).    3.  Headache for over 24 hours.

## 2019-11-11 NOTE — ANESTHESIA CARE TRANSFER NOTE
Patient: Amalia Harris    Procedure(s):  RIGHT PHACOEMULSIFICATION, CATARACT, WITH STANDARD IOL INSERTION    Diagnosis: RIGHT CATARACT  Diagnosis Additional Information: No value filed.    Anesthesia Type:   MAC     Note:  Airway :Room Air  Patient transferred to:Phase II  Comments: To Phase II. Report to RN.  VSS Resp status stable.Handoff Report: Identifed the Patient, Identified the Reponsible Provider, Reviewed the pertinent medical history, Discussed the surgical course, Reviewed Intra-OP anesthesia mangement and issues during anesthesia, Set expectations for post-procedure period and Allowed opportunity for questions and acknowledgement of understanding      Vitals: (Last set prior to Anesthesia Care Transfer)    CRNA VITALS  11/11/2019 0716 - 11/11/2019 0748      11/11/2019             Pulse:  70    SpO2:  99 %                Electronically Signed By: BRII Smith CRNA  November 11, 2019  7:48 AM

## 2019-11-12 ENCOUNTER — OFFICE VISIT (OUTPATIENT)
Dept: OPHTHALMOLOGY | Facility: CLINIC | Age: 69
End: 2019-11-12
Payer: MEDICARE

## 2019-11-12 DIAGNOSIS — H25.812 COMBINED FORM OF AGE-RELATED CATARACT, LEFT EYE: ICD-10-CM

## 2019-11-12 DIAGNOSIS — Z96.1 PSEUDOPHAKIA: Primary | ICD-10-CM

## 2019-11-12 PROCEDURE — 99024 POSTOP FOLLOW-UP VISIT: CPT | Performed by: STUDENT IN AN ORGANIZED HEALTH CARE EDUCATION/TRAINING PROGRAM

## 2019-11-12 ASSESSMENT — VISUAL ACUITY
OS_BAT_MED: 20/300
OS_BAT_HIGH: >20/400
METHOD: SNELLEN - LINEAR
OD_SC: 20/25

## 2019-11-12 ASSESSMENT — SLIT LAMP EXAM - LIDS
COMMENTS: 2+ DERMATOCHALASIS
COMMENTS: 2+ DERMATOCHALASIS

## 2019-11-12 ASSESSMENT — EXTERNAL EXAM - LEFT EYE: OS_EXAM: NORMAL

## 2019-11-12 ASSESSMENT — TONOMETRY
IOP_METHOD: APPLANATION
OD_IOP_MMHG: 24

## 2019-11-12 ASSESSMENT — EXTERNAL EXAM - RIGHT EYE: OD_EXAM: NORMAL

## 2019-11-12 NOTE — PATIENT INSTRUCTIONS
POST-OP CATARACT INSTRUCTIONS    *   Use the following drop(s) in the RIGHT EYE four times a day:        Vigamox (tan top), ketorolac (grey top), and prednisolone (white or pink top)     *   Wear eye shield when sleeping for one week. Do not rub the operated eye.     *   No bending or lifting more than 10 pounds for one week.    *   Keep water out of eye for two weeks.    *   OK to resume aspirin and/or other blood thinners if you stopped.     *   Return as scheduled in about one week.    *   If your vision worsens, eye becomes increasingly red, or becomes painful, call [phone].     Robel Clancy M.D.

## 2019-11-12 NOTE — LETTER
11/12/2019         RE: Amalia Harris  [id number] [facility] [place]  [place] [address]        Dear Colleague,    Thank you for referring your patient, Amalia Harris, to the [facility]. Please see a copy of my visit note below.     Current Eye Medications:  Vigamox, Ketorolac, and Prednisolone 1% right eye 3 times yesterday after getting home.       Subjective:  Status/Post Kelman Phacoemulsification with Posterior Chamber Lens right eye:  11-11-19.  Right eye felt sticky, but today is more comfortable.  Slept well.      Objective:  See Ophthalmology Exam.       Assessment:  Amalia Harris is a 69 year old female who presents with:   Encounter Diagnoses   Name Primary?     Pseudophakia - Right Eye POD1 right eye, doing well.        Combined form of age-related cataract, left eye BAT 20/300.       Plan:  POST-OP CATARACT INSTRUCTIONS    *   Use the following drop(s) in the RIGHT EYE four times a day:        Vigamox (tan top), ketorolac (grey top), and prednisolone (white or pink top)     *   Wear eye shield when sleeping for one week. Do not rub the operated eye.     *   No bending or lifting more than 10 pounds for one week.    *   Keep water out of eye for two weeks.    *   OK to resume aspirin and/or other blood thinners if you stopped.     *   Return as scheduled in about one week.    *   If your vision worsens, eye becomes increasingly red, or becomes painful, call [phone].     Robel Clancy M.D.      Again, thank you for allowing me to participate in the care of your patient.        Sincerely,        Robel Clancy MD

## 2019-11-12 NOTE — PROGRESS NOTES
Current Eye Medications:  Vigamox, Ketorolac, and Prednisolone 1% right eye 3 times yesterday after getting home.       Subjective:  Status/Post Kelman Phacoemulsification with Posterior Chamber Lens right eye:  11-11-19.  Right eye felt sticky, but today is more comfortable.  Slept well.      Objective:  See Ophthalmology Exam.       Assessment:  Amalia Harris is a 69 year old female who presents with:   Encounter Diagnoses   Name Primary?     Pseudophakia - Right Eye POD1 right eye, doing well.        Combined form of age-related cataract, left eye BAT 20/300.       Plan:  POST-OP CATARACT INSTRUCTIONS    *   Use the following drop(s) in the RIGHT EYE four times a day:        Vigamox (tan top), ketorolac (grey top), and prednisolone (white or pink top)     *   Wear eye shield when sleeping for one week. Do not rub the operated eye.     *   No bending or lifting more than 10 pounds for one week.    *   Keep water out of eye for two weeks.    *   OK to resume aspirin and/or other blood thinners if you stopped.     *   Return as scheduled in about one week.    *   If your vision worsens, eye becomes increasingly red, or becomes painful, call [phone].     Robel Clancy M.D.

## 2019-11-20 ENCOUNTER — TELEPHONE (OUTPATIENT)
Dept: OPHTHALMOLOGY | Facility: CLINIC | Age: 69
End: 2019-11-20

## 2019-11-20 NOTE — TELEPHONE ENCOUNTER
Patient needs to be seen for preop for these meds to be prescribed. Appointment scheduled for tomorrow 11/21/19. Drops will be sent at that time.

## 2019-11-20 NOTE — TELEPHONE ENCOUNTER
Received a call from the Pharmacy saying they never received the request for the three meds. Would like them to be sent over.

## 2019-11-21 ENCOUNTER — OFFICE VISIT (OUTPATIENT)
Dept: OPHTHALMOLOGY | Facility: CLINIC | Age: 69
End: 2019-11-21
Payer: MEDICARE

## 2019-11-21 DIAGNOSIS — H25.812 COMBINED FORM OF AGE-RELATED CATARACT, LEFT EYE: ICD-10-CM

## 2019-11-21 DIAGNOSIS — Z96.1 PSEUDOPHAKIA: Primary | ICD-10-CM

## 2019-11-21 PROCEDURE — 99024 POSTOP FOLLOW-UP VISIT: CPT | Performed by: STUDENT IN AN ORGANIZED HEALTH CARE EDUCATION/TRAINING PROGRAM

## 2019-11-21 PROCEDURE — 92136 OPHTHALMIC BIOMETRY: CPT | Mod: 26 | Performed by: STUDENT IN AN ORGANIZED HEALTH CARE EDUCATION/TRAINING PROGRAM

## 2019-11-21 RX ORDER — CYCLOPENTOLATE HYDROCHLORIDE 10 MG/ML
1 SOLUTION/ DROPS OPHTHALMIC
Status: CANCELLED | OUTPATIENT
Start: 2019-11-21

## 2019-11-21 RX ORDER — PHENYLEPHRINE HYDROCHLORIDE 25 MG/ML
1 SOLUTION/ DROPS OPHTHALMIC
Status: CANCELLED | OUTPATIENT
Start: 2019-11-21

## 2019-11-21 RX ORDER — PREDNISOLONE ACETATE 10 MG/ML
1 SUSPENSION/ DROPS OPHTHALMIC 4 TIMES DAILY
Qty: 10 ML | Refills: 0 | Status: SHIPPED | OUTPATIENT
Start: 2019-11-21 | End: 2019-11-26

## 2019-11-21 RX ORDER — KETOROLAC TROMETHAMINE 5 MG/ML
1 SOLUTION OPHTHALMIC 4 TIMES DAILY
Qty: 5 ML | Refills: 0 | Status: SHIPPED | OUTPATIENT
Start: 2019-11-24 | End: 2019-12-23

## 2019-11-21 RX ORDER — MOXIFLOXACIN 5 MG/ML
1 SOLUTION/ DROPS OPHTHALMIC 4 TIMES DAILY
Qty: 3 ML | Refills: 0 | Status: SHIPPED | OUTPATIENT
Start: 2019-11-24 | End: 2019-12-23

## 2019-11-21 RX ORDER — PREDNISOLONE ACETATE 10 MG/ML
1 SUSPENSION/ DROPS OPHTHALMIC 4 TIMES DAILY
Qty: 5 ML | Refills: 0 | Status: SHIPPED | OUTPATIENT
Start: 2019-11-26 | End: 2019-12-23

## 2019-11-21 RX ORDER — TROPICAMIDE 10 MG/ML
1 SOLUTION/ DROPS OPHTHALMIC
Status: CANCELLED | OUTPATIENT
Start: 2019-11-21

## 2019-11-21 RX ORDER — TETRACAINE HYDROCHLORIDE 5 MG/ML
1-2 SOLUTION OPHTHALMIC ONCE
Status: DISCONTINUED | OUTPATIENT
Start: 2019-11-21 | End: 2019-11-26 | Stop reason: HOSPADM

## 2019-11-21 ASSESSMENT — VISUAL ACUITY
CORRECTION_TYPE: GLASSES
OD_SC: 20/20
METHOD: SNELLEN - LINEAR
OS_SC: 20/250

## 2019-11-21 ASSESSMENT — REFRACTION_MANIFEST
OD_SPHERE: +0.25
OD_CYLINDER: SPHERE

## 2019-11-21 ASSESSMENT — SLIT LAMP EXAM - LIDS
COMMENTS: 2+ DERMATOCHALASIS
COMMENTS: 2+ DERMATOCHALASIS

## 2019-11-21 ASSESSMENT — EXTERNAL EXAM - LEFT EYE: OS_EXAM: NORMAL

## 2019-11-21 ASSESSMENT — TONOMETRY
OD_IOP_MMHG: 18
IOP_METHOD: APPLANATION

## 2019-11-21 ASSESSMENT — EXTERNAL EXAM - RIGHT EYE: OD_EXAM: NORMAL

## 2019-11-21 NOTE — LETTER
11/21/2019         RE: Amalia Harris  [address] [facility] [place]  [place] [address]        Dear Colleague,    Thank you for referring your patient, Amalia Harris, to the [facility]. Please see a copy of my visit note below.     Current Eye Medications:  Ran out of drops for the right eye yesterday (Vigamox, Ketorolac, Pred qid right eye)     Subjective:  PO2 Kelman Phacoemulsification intraocular lens right eye, Preop Kelman Phacoemulsification intraocular lens left eye.  Patient wants to proceed with surgery on the left eye.  Her right eye is doing well. Using drops qid.       Objective:  See Ophthalmology Exam.       Assessment:  Amalia Harris is a 69 year old female who presents with:   Encounter Diagnoses   Name Primary?     Pseudophakia - Right Eye POW1 right eye, doing well. Prescribed another bottle of prednisolone to use four times a day in the right eye.        Combined form of age-related cataract, left eye Cataract pre-op left eye. Dil 6.5, interp in OR, 3 drops, target mostly distance left eye.          Plan:  POST-OP CATARACT INSTRUCTIONS for the right eye.    *   Use the following eye drop(s):    another bottle of prednisolone (white or pink top) four times a day until the bottle runs out    *   Stop wearing eye shield.    *   No eye rubbing. May resume heavy lifting.    *   If your vision worsens, eye becomes increasingly red, or becomes painful, call [phone].      PRE-OP CATARACT INSTRUCTIONS for the LEFT EYE:    ** 3 eye drops from the pharmacy at least 3 days before surgery.    *Use the following drops in the left eye 4 times on the day before surgery and once the morning of surgery:                                   Vigamox or Ofloxacin (tan cap)   Ketorolac (grey top)    **We will start the prednisolone (white or pink top) drops AFTER surgery.    *Please bring all your eyedrops to the surgery center.    *If taking more than one drop, wait five minutes between drops.    *No  solid food or drink after midnight. Please take the starred medications with a small sip of water the morning of surgery.    *If you are diabetic, please do not take any diabetic medications the morning of surgery.    Robel Clancy MD  [phone]      Again, thank you for allowing me to participate in the care of your patient.        Sincerely,        Robel Clancy MD

## 2019-11-21 NOTE — PATIENT INSTRUCTIONS
POST-OP CATARACT INSTRUCTIONS for the right eye.    *   Use the following eye drop(s):    another bottle of prednisolone (white or pink top) four times a day until the bottle runs out    *   Stop wearing eye shield.    *   No eye rubbing. May resume heavy lifting.    *   If your vision worsens, eye becomes increasingly red, or becomes painful, call [phone].      PRE-OP CATARACT INSTRUCTIONS for the LEFT EYE:    ** 3 eye drops from the pharmacy at least 3 days before surgery.    *Use the following drops in the left eye 4 times on the day before surgery and once the morning of surgery:                                   Vigamox or Ofloxacin (tan cap)   Ketorolac (grey top)    **We will start the prednisolone (white or pink top) drops AFTER surgery.    *Please bring all your eyedrops to the surgery center.    *If taking more than one drop, wait five minutes between drops.    *No solid food or drink after midnight. Please take the starred medications with a small sip of water the morning of surgery.    *If you are diabetic, please do not take any diabetic medications the morning of surgery.    Robel Clancy MD  [phone]

## 2019-11-21 NOTE — PROGRESS NOTES
Current Eye Medications:  Ran out of drops for the right eye yesterday (Vigamox, Ketorolac, Pred qid right eye)     Subjective:  PO2 Kelman Phacoemulsification intraocular lens right eye, Preop Kelman Phacoemulsification intraocular lens left eye.  Patient wants to proceed with surgery on the left eye.  Her right eye is doing well. Using drops qid.       Objective:  See Ophthalmology Exam.       Assessment:  Amalia Harris is a 69 year old female who presents with:   Encounter Diagnoses   Name Primary?     Pseudophakia - Right Eye POW1 right eye, doing well. Prescribed another bottle of prednisolone to use four times a day in the right eye.        Combined form of age-related cataract, left eye Cataract pre-op left eye. Dil 6.5, interp in OR, 3 drops, target mostly distance left eye.          Plan:  POST-OP CATARACT INSTRUCTIONS for the right eye.    *   Use the following eye drop(s):    another bottle of prednisolone (white or pink top) four times a day until the bottle runs out    *   Stop wearing eye shield.    *   No eye rubbing. May resume heavy lifting.    *   If your vision worsens, eye becomes increasingly red, or becomes painful, call [phone].      PRE-OP CATARACT INSTRUCTIONS for the LEFT EYE:    ** 3 eye drops from the pharmacy at least 3 days before surgery.    *Use the following drops in the left eye 4 times on the day before surgery and once the morning of surgery:                                   Vigamox or Ofloxacin (tan cap)   Ketorolac (grey top)    **We will start the prednisolone (white or pink top) drops AFTER surgery.    *Please bring all your eyedrops to the surgery center.    *If taking more than one drop, wait five minutes between drops.    *No solid food or drink after midnight. Please take the starred medications with a small sip of water the morning of surgery.    *If you are diabetic, please do not take any diabetic medications the morning of surgery.    Robel Clancy  MD  [phone]

## 2019-11-22 ENCOUNTER — ANESTHESIA EVENT (OUTPATIENT)
Dept: SURGERY | Facility: AMBULATORY SURGERY CENTER | Age: 69
End: 2019-11-22

## 2019-11-25 ENCOUNTER — ANESTHESIA (OUTPATIENT)
Dept: SURGERY | Facility: AMBULATORY SURGERY CENTER | Age: 69
End: 2019-11-25
Payer: MEDICARE

## 2019-11-25 ENCOUNTER — HOSPITAL ENCOUNTER (OUTPATIENT)
Facility: AMBULATORY SURGERY CENTER | Age: 69
Discharge: HOME | End: 2019-11-25
Attending: STUDENT IN AN ORGANIZED HEALTH CARE EDUCATION/TRAINING PROGRAM | Admitting: STUDENT IN AN ORGANIZED HEALTH CARE EDUCATION/TRAINING PROGRAM
Payer: MEDICARE

## 2019-11-25 VITALS
TEMPERATURE: 96.8 F | SYSTOLIC BLOOD PRESSURE: 112 MMHG | DIASTOLIC BLOOD PRESSURE: 68 MMHG | OXYGEN SATURATION: 97 % | RESPIRATION RATE: 16 BRPM

## 2019-11-25 PROCEDURE — 66984 XCAPSL CTRC RMVL W/O ECP: CPT | Mod: LT

## 2019-11-25 PROCEDURE — G8918 PT W/O PREOP ORDER IV AB PRO: HCPCS

## 2019-11-25 PROCEDURE — G8907 PT DOC NO EVENTS ON DISCHARG: HCPCS

## 2019-11-25 PROCEDURE — 66984 XCAPSL CTRC RMVL W/O ECP: CPT | Mod: 79 | Performed by: STUDENT IN AN ORGANIZED HEALTH CARE EDUCATION/TRAINING PROGRAM

## 2019-11-25 DEVICE — EYE IMP IOL AMO PCL TECNIS ZCB00 21.0: Type: IMPLANTABLE DEVICE | Site: EYE | Status: FUNCTIONAL

## 2019-11-25 RX ORDER — PHENYLEPHRINE HYDROCHLORIDE 25 MG/ML
1 SOLUTION/ DROPS OPHTHALMIC
Status: COMPLETED | OUTPATIENT
Start: 2019-11-25 | End: 2019-11-25

## 2019-11-25 RX ORDER — ONDANSETRON 4 MG/1
4 TABLET, ORALLY DISINTEGRATING ORAL EVERY 30 MIN PRN
Status: DISCONTINUED | OUTPATIENT
Start: 2019-11-25 | End: 2019-11-26 | Stop reason: HOSPADM

## 2019-11-25 RX ORDER — TETRACAINE HYDROCHLORIDE 5 MG/ML
SOLUTION OPHTHALMIC PRN
Status: DISCONTINUED | OUTPATIENT
Start: 2019-11-25 | End: 2019-11-25 | Stop reason: HOSPADM

## 2019-11-25 RX ORDER — FENTANYL CITRATE 50 UG/ML
25-50 INJECTION, SOLUTION INTRAMUSCULAR; INTRAVENOUS
Status: DISCONTINUED | OUTPATIENT
Start: 2019-11-25 | End: 2019-11-26 | Stop reason: HOSPADM

## 2019-11-25 RX ORDER — LIDOCAINE 40 MG/G
CREAM TOPICAL
Status: DISCONTINUED | OUTPATIENT
Start: 2019-11-25 | End: 2019-11-26 | Stop reason: HOSPADM

## 2019-11-25 RX ORDER — SODIUM CHLORIDE, SODIUM LACTATE, POTASSIUM CHLORIDE, CALCIUM CHLORIDE 600; 310; 30; 20 MG/100ML; MG/100ML; MG/100ML; MG/100ML
INJECTION, SOLUTION INTRAVENOUS CONTINUOUS
Status: DISCONTINUED | OUTPATIENT
Start: 2019-11-25 | End: 2019-11-26 | Stop reason: HOSPADM

## 2019-11-25 RX ORDER — CYCLOPENTOLATE HYDROCHLORIDE 10 MG/ML
1 SOLUTION/ DROPS OPHTHALMIC
Status: COMPLETED | OUTPATIENT
Start: 2019-11-25 | End: 2019-11-25

## 2019-11-25 RX ORDER — ONDANSETRON 2 MG/ML
4 INJECTION INTRAMUSCULAR; INTRAVENOUS EVERY 30 MIN PRN
Status: DISCONTINUED | OUTPATIENT
Start: 2019-11-25 | End: 2019-11-26 | Stop reason: HOSPADM

## 2019-11-25 RX ORDER — FENTANYL CITRATE 50 UG/ML
INJECTION, SOLUTION INTRAMUSCULAR; INTRAVENOUS PRN
Status: DISCONTINUED | OUTPATIENT
Start: 2019-11-25 | End: 2019-11-25

## 2019-11-25 RX ORDER — TROPICAMIDE 10 MG/ML
1 SOLUTION/ DROPS OPHTHALMIC
Status: COMPLETED | OUTPATIENT
Start: 2019-11-25 | End: 2019-11-25

## 2019-11-25 RX ORDER — OXYCODONE HYDROCHLORIDE 5 MG/1
5 TABLET ORAL EVERY 4 HOURS PRN
Status: DISCONTINUED | OUTPATIENT
Start: 2019-11-25 | End: 2019-11-26 | Stop reason: HOSPADM

## 2019-11-25 RX ORDER — HYDROMORPHONE HYDROCHLORIDE 1 MG/ML
.3-.5 INJECTION, SOLUTION INTRAMUSCULAR; INTRAVENOUS; SUBCUTANEOUS EVERY 10 MIN PRN
Status: DISCONTINUED | OUTPATIENT
Start: 2019-11-25 | End: 2019-11-26 | Stop reason: HOSPADM

## 2019-11-25 RX ORDER — MOXIFLOXACIN 5 MG/ML
SOLUTION/ DROPS OPHTHALMIC PRN
Status: DISCONTINUED | OUTPATIENT
Start: 2019-11-25 | End: 2019-11-25 | Stop reason: HOSPADM

## 2019-11-25 RX ORDER — MEPERIDINE HYDROCHLORIDE 25 MG/ML
12.5 INJECTION INTRAMUSCULAR; INTRAVENOUS; SUBCUTANEOUS
Status: DISCONTINUED | OUTPATIENT
Start: 2019-11-25 | End: 2019-11-26 | Stop reason: HOSPADM

## 2019-11-25 RX ORDER — NALOXONE HYDROCHLORIDE 0.4 MG/ML
.1-.4 INJECTION, SOLUTION INTRAMUSCULAR; INTRAVENOUS; SUBCUTANEOUS
Status: DISCONTINUED | OUTPATIENT
Start: 2019-11-25 | End: 2019-11-26 | Stop reason: HOSPADM

## 2019-11-25 RX ORDER — TETRACAINE HYDROCHLORIDE 5 MG/ML
1-2 SOLUTION OPHTHALMIC ONCE
Status: COMPLETED | OUTPATIENT
Start: 2019-11-25 | End: 2019-11-25

## 2019-11-25 RX ADMIN — TROPICAMIDE 1 DROP: 10 SOLUTION/ DROPS OPHTHALMIC at 06:14

## 2019-11-25 RX ADMIN — CYCLOPENTOLATE HYDROCHLORIDE 1 DROP: 10 SOLUTION/ DROPS OPHTHALMIC at 06:14

## 2019-11-25 RX ADMIN — PHENYLEPHRINE HYDROCHLORIDE 1 DROP: 25 SOLUTION/ DROPS OPHTHALMIC at 06:20

## 2019-11-25 RX ADMIN — TROPICAMIDE 1 DROP: 10 SOLUTION/ DROPS OPHTHALMIC at 06:25

## 2019-11-25 RX ADMIN — CYCLOPENTOLATE HYDROCHLORIDE 1 DROP: 10 SOLUTION/ DROPS OPHTHALMIC at 06:25

## 2019-11-25 RX ADMIN — CYCLOPENTOLATE HYDROCHLORIDE 1 DROP: 10 SOLUTION/ DROPS OPHTHALMIC at 06:20

## 2019-11-25 RX ADMIN — PHENYLEPHRINE HYDROCHLORIDE 1 DROP: 25 SOLUTION/ DROPS OPHTHALMIC at 06:25

## 2019-11-25 RX ADMIN — PHENYLEPHRINE HYDROCHLORIDE 1 DROP: 25 SOLUTION/ DROPS OPHTHALMIC at 06:14

## 2019-11-25 RX ADMIN — TROPICAMIDE 1 DROP: 10 SOLUTION/ DROPS OPHTHALMIC at 06:19

## 2019-11-25 RX ADMIN — TETRACAINE HYDROCHLORIDE 1 DROP: 5 SOLUTION OPHTHALMIC at 06:14

## 2019-11-25 RX ADMIN — FENTANYL CITRATE 25 MCG: 50 INJECTION, SOLUTION INTRAMUSCULAR; INTRAVENOUS at 07:23

## 2019-11-25 RX ADMIN — SODIUM CHLORIDE, SODIUM LACTATE, POTASSIUM CHLORIDE, CALCIUM CHLORIDE: 600; 310; 30; 20 INJECTION, SOLUTION INTRAVENOUS at 06:23

## 2019-11-25 NOTE — DISCHARGE INSTRUCTIONS
CATARACT SURGERY POST-OP INSTRUCTIONS  Dr. Robel Clancy  [phone]        Start using all three eye drops today, including   - Vigamox (tan top)   - Ketolorac (grey top)   - Prednisolone (white or pink top)    You should get 3 doses in today and 4 doses daily starting tomorrow.  Wait a few minutes in between putting each drop in.      Keep the eye shield taped in place unless putting drops in. We will remove it for you in the office tomorrow.      Light sensitivity may be noticed. Sunglasses may be worn for comfort.      Do not rub the operated eye.      Keep the operated eye dry. You may wash your hair, bathe or shower, but keep the operated eye closed while doing so.       No swimming, hot tub, or sauna for 2 weeks.      No make up around eye for 5 days.      No bending at the waist or lifting more than 10 pounds for one week.      May take Tylenol (per directions on bottle) for mild pain.      Call the office at [phone] and ask to speak to the on-call ophthalmologist  if any of the following should occur:  o Any sudden vision changes  o Nausea or severe headache  o Increase in pain not controlled  o Or signs of infection (pus, increasing redness or tenderness)  [facility]  Same-Day Surgery   Adult Discharge Orders & Instructions   For 24 hours after surgery  1. Get plenty of rest.  A responsible adult must stay with you for at least 24 hours after you leave the hospital.   2. Do not drive or use heavy equipment.  If you have weakness or tingling, don't drive or use heavy equipment until this feeling goes away.  3. Do not drink alcohol.  4. Avoid strenuous or risky activities.  Ask for help when climbing stairs.   5. You may feel lightheaded.  IF so, sit for a few minutes before standing.  Have someone help you get up.   6. If you have nausea (feel sick to your stomach): Drink only clear liquids such as apple juice, ginger ale, broth or 7-Up.  Rest may also help.  Be sure to  drink enough fluids.  Move to a regular diet as you feel able.  7. You may have a slight fever. Call the doctor if your fever is over 100 F (37.7 C) (taken under the tongue) or lasts longer than 24 hours.  8. You may have a dry mouth, a sore throat, muscle aches or trouble sleeping.  These should go away after 24 hours.  9. Do not make important or legal decisions.   Call your doctor for any of the followin.  Signs of infection (fever, growing tenderness at the surgery site, a large amount of drainage or bleeding, severe pain, foul-smelling drainage, redness, swelling).  2. It has been over 8 to 10 hours since surgery and you are still not able to urinate (pass water).  3.  Headache for over 24 hours.

## 2019-11-25 NOTE — ANESTHESIA CARE TRANSFER NOTE
Patient: Amalia Harris    Procedure(s):  LEFT PHACOEMULSIFICATION, CATARACT, WITH STANDARD IOL INSERTION    Diagnosis: LEFT CATARACT  Diagnosis Additional Information: No value filed.    Anesthesia Type:   No value filed.     Note:  Airway :Room Air  Patient transferred to:Phase II  Comments: To Phase II. Report to RN.  VSS Resp status stable.Handoff Report: Identifed the Patient, Identified the Reponsible Provider, Reviewed the pertinent medical history, Discussed the surgical course, Reviewed Intra-OP anesthesia mangement and issues during anesthesia, Set expectations for post-procedure period and Allowed opportunity for questions and acknowledgement of understanding      Vitals: (Last set prior to Anesthesia Care Transfer)    CRNA VITALS  11/25/2019 0716 - 11/25/2019 0748      11/25/2019             Pulse:  68    SpO2:  99 %                Electronically Signed By: BRII Smith CRNA  November 25, 2019  7:48 AM

## 2019-11-25 NOTE — ANESTHESIA POSTPROCEDURE EVALUATION
Anesthesia POST Procedure Evaluation    Patient: Amalia Harris   MRN:     [id number] Gender:   female   Age:    69 year old :      1950        Preoperative Diagnosis: LEFT CATARACT   Procedure(s):  LEFT PHACOEMULSIFICATION, CATARACT, WITH STANDARD IOL INSERTION   Postop Comments: No value filed.       Anesthesia Type:  No value filed.  No value filed.    Reportable Event: NO     PAIN: Uncomplicated   Sign Out status: Comfortable, Well controlled pain     PONV: No PONV   Sign Out status:  No Nausea or Vomiting     Neuro/Psych: Uneventful perioperative course   Sign Out Status: Preoperative baseline; Age appropriate mentation     Airway/Resp.: Uneventful perioperative course   Sign Out Status: Non labored breathing, age appropriate RR; Resp. Status within EXPECTED Parameters     CV: Uneventful perioperative course   Sign Out status: Appropriate BP and perfusion indices; Appropriate HR/Rhythm     Disposition:   Sign Out in:  PACU  Disposition:  Phase II; Home  Recovery Course: Uneventful  Follow-Up: Not required           Last Anesthesia Record Vitals:  CRNA VITALS  2019 0716 - 2019 0816      2019             Pulse:  68    SpO2:  99 %          Last PACU Vitals:  Vitals Value Taken Time   BP     Temp     Pulse     Resp     SpO2     Temp src     NIBP 106/63 2019  7:41 AM   Pulse 68 2019  7:46 AM   SpO2 99 % 2019  7:46 AM   Resp     Temp     Ht Rate     Temp 2           Electronically Signed By: Joe Michaels MD, 2019, 2:37 PM

## 2019-11-25 NOTE — ANESTHESIA PREPROCEDURE EVALUATION
Anesthesia Pre-Procedure Evaluation    Patient: Amalia Harris   MRN:     [id number] Gender:   female   Age:    69 year old :      1950        Preoperative Diagnosis: LEFT CATARACT   Procedure(s):  LEFT PHACOEMULSIFICATION, CATARACT, WITH STANDARD IOL INSERTION     Past Medical History:   Diagnosis Date     Guaiac positive stools 2016     Hyperlipidemia LDL goal <130 3/23/2011     Hypertension      Major depression 3/23/2011     Nonsenile cataract      Osteoarthritis 3/23/2011      Past Surgical History:   Procedure Laterality Date     GALLBLADDER SURGERY  1986     PHACOEMULSIFICATION WITH STANDARD INTRAOCULAR LENS IMPLANT Right 2019    Procedure: RIGHT PHACOEMULSIFICATION, CATARACT, WITH STANDARD IOL INSERTION;  Surgeon: Robel Clancy MD;  Location: [facility] OR          Anesthesia Evaluation     .             ROS/MED HX    ENT/Pulmonary:  - neg pulmonary ROS     Neurologic:  - neg neurologic ROS     Cardiovascular:  - neg cardiovascular ROS   (+) hypertension----. : . CHF . . :. .       METS/Exercise Tolerance:     Hematologic:  - neg hematologic  ROS       Musculoskeletal:  - neg musculoskeletal ROS (+) arthritis,  -       GI/Hepatic:  - neg GI/hepatic ROS       Renal/Genitourinary:  - ROS Renal section negative   (+) Nephrolithiasis ,       Endo:  - neg endo ROS   (+) type II DM Obesity, .      Psychiatric:  - neg psychiatric ROS   (+) psychiatric history depression      Infectious Disease:  - neg infectious disease ROS       Malignancy:      - no malignancy   Other:    - neg other ROS                     PHYSICAL EXAM:   Mental Status/Neuro: A/A/O   Airway: Facies: Feasible  Mallampati: II  Mouth/Opening: Full  TM distance: > 6 cm  Neck ROM: Full   Respiratory: Auscultation: CTAB     Resp. Rate: Normal     Resp. Effort: Normal      CV: Rhythm: Regular  Rate: Age appropriate  Heart: Normal Sounds  Edema: None   Comments:      Dental: Normal Dentition                LABS:  CBC:   Lab Results   Component  "Value Date    WBC 7.8 11/04/2019    WBC 14.0 (H) 09/12/2018    HGB 12.6 11/04/2019    HGB 12.3 09/12/2018    HCT 38.5 11/04/2019    HCT 38.1 09/12/2018     11/04/2019     09/12/2018     BMP:   Lab Results   Component Value Date     09/05/2019     12/17/2018    POTASSIUM 4.1 11/04/2019    POTASSIUM 3.8 09/05/2019    CHLORIDE 108 09/05/2019    CHLORIDE 107 12/17/2018    CO2 24 09/05/2019    CO2 27 12/17/2018    BUN 21 09/05/2019    BUN 20 12/17/2018    CR 0.89 11/04/2019    CR 0.95 09/05/2019     (H) 11/04/2019     (H) 09/05/2019     COAGS: No results found for: PTT, INR, FIBR  POC: No results found for: BGM, HCG, HCGS  OTHER:   Lab Results   Component Value Date    A1C 6.3 (H) 09/05/2019    DILLON 9.1 09/05/2019    ALBUMIN 3.3 (L) 12/17/2018    PROTTOTAL 7.7 12/17/2018    ALT 27 09/05/2019    AST 18 12/17/2018    ALKPHOS 81 12/17/2018    BILITOTAL 0.4 12/17/2018    TSH 2.09 09/12/2018        Preop Vitals    BP Readings from Last 3 Encounters:   11/25/19 112/68   11/11/19 128/58   11/04/19 133/75    Pulse Readings from Last 3 Encounters:   11/11/19 68   11/04/19 91   09/05/19 89      Resp Readings from Last 3 Encounters:   11/25/19 16   11/11/19 16   11/04/19 16    SpO2 Readings from Last 3 Encounters:   11/25/19 97%   11/11/19 98%   11/04/19 97%      Temp Readings from Last 1 Encounters:   11/25/19 36  C (96.8  F)    Ht Readings from Last 1 Encounters:   11/04/19 1.473 m (4' 10\")      Wt Readings from Last 1 Encounters:   11/04/19 80.7 kg (178 lb)    Estimated body mass index is 37.2 kg/m  as calculated from the following:    Height as of 11/4/19: 1.473 m (4' 10\").    Weight as of 11/4/19: 80.7 kg (178 lb).     LDA:        Assessment:   ASA SCORE: 3    H&P: History and physical reviewed and following examination; no interval change.   Smoking Status:  Non-Smoker/Unknown   NPO Status: NPO Appropriate     Plan:   Anes. Type:  MAC   Pre-Medication: None   Induction:  N/a   Airway: " Native Airway   Access/Monitoring: PIV   Maintenance: N/a     Postop Plan:   Postop Pain: None  Postop Sedation/Airway: Not planned  Disposition: Outpatient     PONV Management:  NO PONV Prophylaxis Required     CONSENT: Direct conversation   Plan and risks discussed with: Patient   Blood Products: Consent Deferred (Minimal Blood Loss)                       Joe Michaels MD

## 2019-11-25 NOTE — OP NOTE
PreOp Diagnosis: Visually significant nuclear sclerotic cataract left eye  PostOp Diagnosis: Same  Surgeon: Robel Clancy MD  Implant: Technis [id number] 21.0D    Procedures:   1. Review of intraocular lens calculations, both eyes   2. Phacoemulsification and extraction of lens  left eye   3. Intraocular lens implantation left eye  Anesthesia: MAC/topical  Complications: None  EBL: <1cc    Amalia Harris suffers from a visually significant cataract of the left eye. This has caused problems with distance and reading vision, including glare. After discussing the risks, benefits, and alternatives, the patient wishes to proceed with cataract surgery.    The patient was identified in the pre-op area where the left eye was marked. The patient was then brought to the operating room where a time out was called, identifying the patient, the procedure, and the correct site. Tetracaine drops were applied to the operative eye. The operative eye was then prepped and draped in the usual sterile ophthalmic fashion. An eyelid speculum was placed into the operative eye. Additional tetracaine drops were applied. A paracentesis was made inferior with a side port blade. 1% preservative-free lidocaine and epinephrine was injected into the anterior chamber.Endocoat was injected to deepen the anterior chamber. A 2.4mm clear corneal wound was created with a keratome blade temporally. A continuous curvilinear capsulorrhexis was started with a bent cystitome and completed with Utrada forceps. Hydrodissection of the lens nucleus was performed with BSS on a cannula. The lens nucleus was rotated. Phacoemulsification of the lens nucleus was accomplished in a phacoemulsification stop and chop technique. Remaining cortex was removed with irrigation and aspiration. The lens capsule was noted to be intact. Healon was used to inflate the capsular bag.  The lens was injected into the capsular bag. Remaining viscoelastic was removed with irrigation and  aspiration. The wounds were checked and found to be watertight after hydration. The eyelid speculum was removed and Vigamox drops were placed into the operative eye. The patient tolerated the procedure well and was in stable condition on the way to the recovery area.     Robel Clancy MD

## 2019-11-26 ENCOUNTER — OFFICE VISIT (OUTPATIENT)
Dept: OPHTHALMOLOGY | Facility: CLINIC | Age: 69
End: 2019-11-26
Payer: MEDICARE

## 2019-11-26 DIAGNOSIS — Z96.1 PSEUDOPHAKIA: Primary | ICD-10-CM

## 2019-11-26 PROCEDURE — 99024 POSTOP FOLLOW-UP VISIT: CPT | Performed by: STUDENT IN AN ORGANIZED HEALTH CARE EDUCATION/TRAINING PROGRAM

## 2019-11-26 RX ORDER — PREDNISOLONE ACETATE 10 MG/ML
1 SUSPENSION/ DROPS OPHTHALMIC 4 TIMES DAILY
Qty: 5 ML | Refills: 0 | Status: SHIPPED | OUTPATIENT
Start: 2019-11-26 | End: 2019-12-23

## 2019-11-26 ASSESSMENT — SLIT LAMP EXAM - LIDS
COMMENTS: 2+ DERMATOCHALASIS
COMMENTS: 2+ DERMATOCHALASIS

## 2019-11-26 ASSESSMENT — TONOMETRY
IOP_METHOD: APPLANATION
OS_IOP_MMHG: 27

## 2019-11-26 ASSESSMENT — VISUAL ACUITY
METHOD: SNELLEN - LINEAR
OS_SC+: -2
OS_SC: 20/20

## 2019-11-26 ASSESSMENT — EXTERNAL EXAM - RIGHT EYE: OD_EXAM: NORMAL

## 2019-11-26 ASSESSMENT — EXTERNAL EXAM - LEFT EYE: OS_EXAM: NORMAL

## 2019-11-26 NOTE — PROGRESS NOTES
" Current Eye Medications:  Vigamox qid left eye, Prednisolone 1% qid os     Subjective:  PO1 Kelman Phacoemulsification intraocular lens left eye  Patient states that the left eye is blurry and \"really bright\".     Objective:  See Ophthalmology Exam.       Assessment:  Amalia Harris is a 69 year old female who presents with:   Encounter Diagnoses   Name Primary?     Pseudophakia - Both Eyes POD1 left eye, POW2 right eye, both doing well.        Plan:  POST-OP CATARACT INSTRUCTIONS    *   Use the following drop(s) in the LEFT EYE four times a day:        Vigamox (tan top), ketorolac (grey top), and prednisolone (white or pink top)     * Use the following drop in the RIGHT EYE four times a day until the bottle runs out:   Prednisolone (white or pink top)    *   Wear eye shield when sleeping for one week. Do not rub the operated eye.     *   No bending or lifting more than 10 pounds for one week.    *   Keep water out of eye for two weeks.    *   OK to resume aspirin and/or other blood thinners if you stopped.     *   Return as scheduled in about one week.    *   If your vision worsens, eye becomes increasingly red, or becomes painful, call [phone].     Robel Clancy M.D.    "

## 2019-11-26 NOTE — PATIENT INSTRUCTIONS
POST-OP CATARACT INSTRUCTIONS    *   Use the following drop(s) in the LEFT EYE four times a day:        Vigamox (tan top), ketorolac (grey top), and prednisolone (white or pink top)     * Use the following drop in the RIGHT EYE four times a day until the bottle runs out:   Prednisolone (white or pink top)    *   Wear eye shield when sleeping for one week. Do not rub the operated eye.     *   No bending or lifting more than 10 pounds for one week.    *   Keep water out of eye for two weeks.    *   OK to resume aspirin and/or other blood thinners if you stopped.     *   Return as scheduled in about one week.    *   If your vision worsens, eye becomes increasingly red, or becomes painful, call [phone].     Robel Clancy M.D.

## 2019-11-26 NOTE — LETTER
"    11/26/2019         RE: Amalia Harris  [address] [facility] [address]        Dear Colleague,    Thank you for referring your patient, Amalia Harris, to the [facility]. Please see a copy of my visit note below.     Current Eye Medications:  Vigamox qid left eye, Prednisolone 1% qid os     Subjective:  PO1 Kelman Phacoemulsification intraocular lens left eye  Patient states that the left eye is blurry and \"really bright\".     Objective:  See Ophthalmology Exam.       Assessment:  Amalia Harris is a 69 year old female who presents with:   Encounter Diagnoses   Name Primary?     Pseudophakia - Both Eyes POD1 left eye, POW2 right eye, both doing well.        Plan:  POST-OP CATARACT INSTRUCTIONS    *   Use the following drop(s) in the LEFT EYE four times a day:        Vigamox (tan top), ketorolac (grey top), and prednisolone (white or pink top)     * Use the following drop in the RIGHT EYE four times a day until the bottle runs out:   Prednisolone (white or pink top)    *   Wear eye shield when sleeping for one week. Do not rub the operated eye.     *   No bending or lifting more than 10 pounds for one week.    *   Keep water out of eye for two weeks.    *   OK to resume aspirin and/or other blood thinners if you stopped.     *   Return as scheduled in about one week.    *   If your vision worsens, eye becomes increasingly red, or becomes painful, call [phone].     Robel Clancy M.D.      Again, thank you for allowing me to participate in the care of your patient.        Sincerely,        Robel Clancy MD    "

## 2019-12-04 ENCOUNTER — OFFICE VISIT (OUTPATIENT)
Dept: OPHTHALMOLOGY | Facility: CLINIC | Age: 69
End: 2019-12-04
Payer: MEDICARE

## 2019-12-04 DIAGNOSIS — Z96.1 PSEUDOPHAKIA: Primary | ICD-10-CM

## 2019-12-04 PROCEDURE — 99024 POSTOP FOLLOW-UP VISIT: CPT | Performed by: STUDENT IN AN ORGANIZED HEALTH CARE EDUCATION/TRAINING PROGRAM

## 2019-12-04 ASSESSMENT — TONOMETRY
IOP_METHOD: APPLANATION
OS_IOP_MMHG: 21
OD_IOP_MMHG: 19

## 2019-12-04 ASSESSMENT — SLIT LAMP EXAM - LIDS
COMMENTS: 2+ DERMATOCHALASIS
COMMENTS: 2+ DERMATOCHALASIS

## 2019-12-04 ASSESSMENT — VISUAL ACUITY
OD_SC: 20/25
METHOD: SNELLEN - LINEAR
OS_SC: 20/20
OD_SC+: +2

## 2019-12-04 ASSESSMENT — REFRACTION_MANIFEST
OS_SPHERE: -0.25
OS_CYLINDER: +0.50
OS_AXIS: 150

## 2019-12-04 ASSESSMENT — EXTERNAL EXAM - RIGHT EYE: OD_EXAM: NORMAL

## 2019-12-04 ASSESSMENT — EXTERNAL EXAM - LEFT EYE: OS_EXAM: NORMAL

## 2019-12-04 NOTE — LETTER
"    12/4/2019         RE: Amalia Harris  [address] [facility] [address]        Dear Colleague,    Thank you for referring your patient, Amalia Harris, to the [facility]. Please see a copy of my visit note below.     Current Eye Medications:  Prednisolone, Ketorolac and Vigamox Left eye four times a day  Also taking two different drops in the left eye, one of them is gone, not sure which     Subjective:  Here for PO2 left eye. Has another bottle of something on order at the pharmacy for the right eye, but wants to know if she needs to . Her insurance does not cover another bottle. If she doesn't need this, would like drop in case of irritation     Objective:  See Ophthalmology Exam.      Assessment:  Amalia Harris is a 69 year old female who presents with:     Pseudophakia - Both Eyes POW1 left eye and POW3 right eye, doing well.        Plan:  *   For the left eye, continue Vigamox (tan top), ketorolac (grey top), and prednisolone (white or pink top) four times a day until each bottle runs out    *   For the right eye, continue your remaining eyedrops four times a day until the bottle runs out    *   Stop wearing eye shield.    *   No eye rubbing. May resume heavy lifting.    *  Use artificial tears up to 4 times per day (Refresh Optive, Systane Balance, TheraTears, or generic artificial tears are ok. Avoid \"get the red out\" drops).     *   Return one month for final exam with glasses check.    *   If your vision worsens, eye becomes increasingly red, or becomes painful, call [phone].    Robel Clancy M.D.  [phone]           Again, thank you for allowing me to participate in the care of your patient.        Sincerely,        Robel Clancy MD    "

## 2019-12-04 NOTE — PATIENT INSTRUCTIONS
"*   For the left eye, continue Vigamox (tan top), ketorolac (grey top), and prednisolone (white or pink top) four times a day until each bottle runs out    *   For the right eye, continue your remaining eyedrops four times a day until the bottle runs out    *   Stop wearing eye shield.    *   No eye rubbing. May resume heavy lifting.    *  Use artificial tears up to 4 times per day (Refresh Optive, Systane Balance, TheraTears, or generic artificial tears are ok. Avoid \"get the red out\" drops).     *   Return one month for final exam with glasses check.    *   If your vision worsens, eye becomes increasingly red, or becomes painful, call [phone].    Robel Clancy M.D.  [phone]  "

## 2019-12-04 NOTE — PROGRESS NOTES
" Current Eye Medications:  Prednisolone, Ketorolac and Vigamox Left eye four times a day  Also taking two different drops in the left eye, one of them is gone, not sure which     Subjective:  Here for PO2 left eye. Has another bottle of something on order at the pharmacy for the right eye, but wants to know if she needs to . Her insurance does not cover another bottle. If she doesn't need this, would like drop in case of irritation     Objective:  See Ophthalmology Exam.      Assessment:  Amalia Harris is a 69 year old female who presents with:     Pseudophakia - Both Eyes POW1 left eye and POW3 right eye, doing well.        Plan:  *   For the left eye, continue Vigamox (tan top), ketorolac (grey top), and prednisolone (white or pink top) four times a day until each bottle runs out    *   For the right eye, continue your remaining eyedrops four times a day until the bottle runs out    *   Stop wearing eye shield.    *   No eye rubbing. May resume heavy lifting.    *  Use artificial tears up to 4 times per day (Refresh Optive, Systane Balance, TheraTears, or generic artificial tears are ok. Avoid \"get the red out\" drops).     *   Return one month for final exam with glasses check.    *   If your vision worsens, eye becomes increasingly red, or becomes painful, call [phone].    Robel Clancy M.D.  [phone]         "

## 2019-12-23 ENCOUNTER — OFFICE VISIT (OUTPATIENT)
Dept: OPHTHALMOLOGY | Facility: CLINIC | Age: 69
End: 2019-12-23
Payer: MEDICARE

## 2019-12-23 DIAGNOSIS — H11.001 PTERYGIUM OF RIGHT EYE: ICD-10-CM

## 2019-12-23 DIAGNOSIS — Z96.1 PSEUDOPHAKIA: Primary | ICD-10-CM

## 2019-12-23 PROCEDURE — 99024 POSTOP FOLLOW-UP VISIT: CPT | Performed by: STUDENT IN AN ORGANIZED HEALTH CARE EDUCATION/TRAINING PROGRAM

## 2019-12-23 ASSESSMENT — REFRACTION_MANIFEST
OD_AXIS: 010
OD_ADD: +3.00
OD_CYLINDER: +0.25
OS_CYLINDER: SPHERE
OD_SPHERE: +0.50
OS_SPHERE: PLANO
OS_ADD: +3.00

## 2019-12-23 ASSESSMENT — VISUAL ACUITY
OS_SC+: -2
OD_SC+: -1
OS_SC: 20/20
METHOD: SNELLEN - LINEAR
OD_SC: 20/20

## 2019-12-23 ASSESSMENT — CUP TO DISC RATIO
OD_RATIO: 0.3
OS_RATIO: 0.4

## 2019-12-23 ASSESSMENT — SLIT LAMP EXAM - LIDS
COMMENTS: 2+ DERMATOCHALASIS
COMMENTS: 2+ DERMATOCHALASIS

## 2019-12-23 ASSESSMENT — EXTERNAL EXAM - RIGHT EYE: OD_EXAM: NORMAL

## 2019-12-23 ASSESSMENT — TONOMETRY
OD_IOP_MMHG: 19
IOP_METHOD: APPLANATION
OS_IOP_MMHG: 20

## 2019-12-23 ASSESSMENT — EXTERNAL EXAM - LEFT EYE: OS_EXAM: NORMAL

## 2019-12-23 NOTE — PATIENT INSTRUCTIONS
*  Discontinue all drops, unless used prior to cataract surgery.    *  Fill prescription for new glasses or drugstore readers.    *  Return to clinic in 1 year for a complete eye exam or earlier if problems should arise.    Robel Clancy M.D.  [phone]

## 2019-12-23 NOTE — PROGRESS NOTES
Current Eye Medications: artificial tears prn both eyes     Subjective:  Here for final MR both eyes.   Vision has gotten better.  Some irritation sometimes, but uses tears and it gets better. Finished surgical drops.     Objective:  See Ophthalmology Exam.       Assessment:  Amalia Harris is a 69 year old female who presents with:   Encounter Diagnoses   Name Primary?     Pseudophakia - Both Eyes Final MR both eyes, doing well.      Pterygium of right eye        Plan:  *  Discontinue all drops, unless used prior to cataract surgery.    *  Fill prescription for new glasses or drugstore readers.    *  Return to clinic in 1 year for a complete eye exam or earlier if problems should arise.    Robel Clancy M.D.  [phone]

## 2019-12-23 NOTE — LETTER
12/23/2019         RE: Amalia Harris  [address] [facility] [address]        Dear Colleague,    Thank you for referring your patient, Amalia Harris, to the [facility]. Please see a copy of my visit note below.     Current Eye Medications: artificial tears prn both eyes     Subjective:  Here for final MR both eyes.   Vision has gotten better.  Some irritation sometimes, but uses tears and it gets better. Finished surgical drops.     Objective:  See Ophthalmology Exam.       Assessment:  Amalia Harris is a 69 year old female who presents with:   Encounter Diagnoses   Name Primary?     Pseudophakia - Both Eyes Final MR both eyes, doing well.      Pterygium of right eye        Plan:  *  Discontinue all drops, unless used prior to cataract surgery.    *  Fill prescription for new glasses or drugstore readers.    *  Return to clinic in 1 year for a complete eye exam or earlier if problems should arise.    Robel Clancy M.D.  [phone]      Again, thank you for allowing me to participate in the care of your patient.        Sincerely,        Robel Clancy MD

## 2019-12-31 ENCOUNTER — ANCILLARY PROCEDURE (OUTPATIENT)
Dept: CARDIOLOGY | Facility: CLINIC | Age: 69
End: 2019-12-31
Attending: FAMILY MEDICINE
Payer: MEDICARE

## 2019-12-31 DIAGNOSIS — R01.1 UNDIAGNOSED CARDIAC MURMURS: ICD-10-CM

## 2019-12-31 PROCEDURE — 93306 TTE W/DOPPLER COMPLETE: CPT | Performed by: INTERNAL MEDICINE

## 2020-03-09 ENCOUNTER — OFFICE VISIT (OUTPATIENT)
Dept: FAMILY MEDICINE | Facility: CLINIC | Age: 70
End: 2020-03-09
Payer: MEDICARE

## 2020-03-09 VITALS
DIASTOLIC BLOOD PRESSURE: 68 MMHG | TEMPERATURE: 97.7 F | HEIGHT: 58 IN | WEIGHT: 183 LBS | HEART RATE: 89 BPM | SYSTOLIC BLOOD PRESSURE: 101 MMHG | OXYGEN SATURATION: 96 % | BODY MASS INDEX: 38.41 KG/M2

## 2020-03-09 DIAGNOSIS — F32.0 CURRENT MILD EPISODE OF MAJOR DEPRESSIVE DISORDER WITHOUT PRIOR EPISODE (H): ICD-10-CM

## 2020-03-09 DIAGNOSIS — I05.0 MITRAL VALVE STENOSIS, UNSPECIFIED ETIOLOGY: ICD-10-CM

## 2020-03-09 DIAGNOSIS — I35.0 AORTIC VALVE STENOSIS, ETIOLOGY OF CARDIAC VALVE DISEASE UNSPECIFIED: ICD-10-CM

## 2020-03-09 DIAGNOSIS — R80.9 TYPE 2 DIABETES MELLITUS WITH MICROALBUMINURIA, WITHOUT LONG-TERM CURRENT USE OF INSULIN (H): Primary | ICD-10-CM

## 2020-03-09 DIAGNOSIS — I11.0 BENIGN HYPERTENSIVE HEART DISEASE WITH CONGESTIVE HEART FAILURE (H): ICD-10-CM

## 2020-03-09 DIAGNOSIS — E78.5 HYPERLIPIDEMIA LDL GOAL <100: ICD-10-CM

## 2020-03-09 DIAGNOSIS — E11.29 TYPE 2 DIABETES MELLITUS WITH MICROALBUMINURIA, WITHOUT LONG-TERM CURRENT USE OF INSULIN (H): Primary | ICD-10-CM

## 2020-03-09 DIAGNOSIS — R82.991 HYPOCITRATURIA: ICD-10-CM

## 2020-03-09 DIAGNOSIS — E66.01 SEVERE OBESITY (BMI 35.0-39.9) WITH COMORBIDITY (H): ICD-10-CM

## 2020-03-09 LAB
ALT SERPL W P-5'-P-CCNC: 27 U/L (ref 0–50)
CHOLEST SERPL-MCNC: 150 MG/DL
CREAT SERPL-MCNC: 0.91 MG/DL (ref 0.52–1.04)
CREAT UR-MCNC: 168 MG/DL
GFR SERPL CREATININE-BSD FRML MDRD: 65 ML/MIN/{1.73_M2}
HBA1C MFR BLD: 7.4 % (ref 0–5.6)
HDLC SERPL-MCNC: 42 MG/DL
LDLC SERPL CALC-MCNC: 72 MG/DL
MICROALBUMIN UR-MCNC: 12 MG/L
MICROALBUMIN/CREAT UR: 7.38 MG/G CR (ref 0–25)
NONHDLC SERPL-MCNC: 108 MG/DL
POTASSIUM SERPL-SCNC: 4.2 MMOL/L (ref 3.4–5.3)
TRIGL SERPL-MCNC: 178 MG/DL

## 2020-03-09 PROCEDURE — 99214 OFFICE O/P EST MOD 30 MIN: CPT | Performed by: FAMILY MEDICINE

## 2020-03-09 PROCEDURE — 80061 LIPID PANEL: CPT | Performed by: FAMILY MEDICINE

## 2020-03-09 PROCEDURE — 83036 HEMOGLOBIN GLYCOSYLATED A1C: CPT | Performed by: FAMILY MEDICINE

## 2020-03-09 PROCEDURE — 36415 COLL VENOUS BLD VENIPUNCTURE: CPT | Performed by: FAMILY MEDICINE

## 2020-03-09 PROCEDURE — 82043 UR ALBUMIN QUANTITATIVE: CPT | Performed by: FAMILY MEDICINE

## 2020-03-09 PROCEDURE — 84460 ALANINE AMINO (ALT) (SGPT): CPT | Performed by: FAMILY MEDICINE

## 2020-03-09 PROCEDURE — 82565 ASSAY OF CREATININE: CPT | Performed by: FAMILY MEDICINE

## 2020-03-09 PROCEDURE — 84132 ASSAY OF SERUM POTASSIUM: CPT | Performed by: FAMILY MEDICINE

## 2020-03-09 RX ORDER — POTASSIUM CITRATE 10 MEQ/1
30 TABLET, EXTENDED RELEASE ORAL 2 TIMES DAILY
Qty: 540 TABLET | Refills: 1 | Status: SHIPPED | OUTPATIENT
Start: 2020-03-09 | End: 2020-10-27

## 2020-03-09 RX ORDER — LOVASTATIN 40 MG
40 TABLET ORAL EVERY EVENING
Qty: 90 TABLET | Refills: 1 | Status: SHIPPED | OUTPATIENT
Start: 2020-03-09 | End: 2020-10-22

## 2020-03-09 RX ORDER — LOSARTAN POTASSIUM 25 MG/1
25 TABLET ORAL DAILY
Qty: 90 TABLET | Refills: 1 | Status: SHIPPED | OUTPATIENT
Start: 2020-03-09 | End: 2020-10-20

## 2020-03-09 ASSESSMENT — PAIN SCALES - GENERAL: PAINLEVEL: NO PAIN (0)

## 2020-03-09 ASSESSMENT — MIFFLIN-ST. JEOR: SCORE: 1254.83

## 2020-03-09 NOTE — PATIENT INSTRUCTIONS
At [facility], we strive to deliver an exceptional experience to you, every time we see you. If you receive a survey, please complete it as we do value your feedback.  If you have MyChart, you can expect to receive results automatically within 24 hours of their completion.  Your provider will send a note interpreting your results as well.   If you do not have MyChart, you should receive your results in about a week by mail.    Your care team:                            Family Medicine Internal Medicine   MD Gonzalo Palomino MD Shantel Branch-Fleming, MD Katya Georgiev PA-C Megan Hill, APRCHARISMA Perrin, MD Pediatrics   Isaiah Garcia, PASajanC  Isabel George, MD Chelsea Garcia APRN CNP   MD Kiersten Michele MD Deborah Mielke, MD Kim Thein, APRN CNP      Clinic hours: Monday - Thursday 7 am-7 pm; Fridays 7 am-5 pm.   Urgent care: Monday - Friday 11 am-9 pm; Saturday and Sunday 9 am-5 pm.  Pharmacy : Monday -Thursday 8 am-8 pm; Friday 8 am-6 pm; Saturday and Sunday 9 am-5 pm.     Clinic: [phone]   Pharmacy: [phone]

## 2020-03-09 NOTE — PROGRESS NOTES
Subjective     Amalia Harris is a 69 year old female who presents to clinic today for the following health issues:    HPI   Diabetes Follow-up    How often are you checking your blood sugar? About 3 times per week, not daily because she doesn't believe she has diabetes.  What time of day are you checking your blood sugars (select all that apply)?  After meals   Have you had any blood sugars above 200?  No - usually her glucose ranges between 90 and 150  Have you had any blood sugars below 70?  No    What symptoms do you notice when your blood sugar is low?  None    What concerns do you have today about your diabetes? None - she has never been on medication      Do you have any of these symptoms? (Select all that apply)  No numbness or tingling in feet.  No redness, sores or blisters on feet.  No complaints of excessive thirst.  No reports of blurry vision.  No significant changes to weight.  She will start monitoring her sugar intake. She likes cake, and she goes to a lot of birthday parties.    Hyperlipidemia Follow-Up      Are you regularly taking any medication or supplement to lower your cholesterol?   Yes- Lovastin    Are you having muscle aches or other side effects that you think could be caused by your cholesterol lowering medication?  No    Hypertension Follow-up      Do you check your blood pressure regularly outside of the clinic? Yes     Are you following a low salt diet? Yes    Are your blood pressures ever more than 140 on the top number (systolic) OR more   than 90 on the bottom number (diastolic), for example 140/90? No    BP Readings from Last 2 Encounters:   03/09/20 101/68   11/25/19 112/68     Hemoglobin A1C (%)   Date Value   09/05/2019 6.3 (H)   12/17/2018 6.8 (H)     LDL Cholesterol Calculated (mg/dL)   Date Value   09/05/2019 127 (H)   12/17/2018 168 (H)         How many servings of fruits and vegetables do you eat daily?  2    On average, how many sweetened beverages do you drink each day  "(Examples: soda, juice, sweet tea, etc.  Do NOT count diet or artificially sweetened beverages)?   0    How many days per week do you exercise enough to make your heart beat faster? 0    How many minutes a day do you exercise enough to make your heart beat faster? 0    How many days per week do you miss taking your medication? 0    Ultrasound Results  She had a heart US about 1 month ago, and she would like to go over the results.    Past medical, family, and social histories, medications, and allergies are reviewed and updated in [facility].    Review of Systems   ROS COMP: Constitutional, HEENT, cardiovascular, pulmonary, gi and gu systems are negative, except as otherwise noted.      Psych: PHQ-9 = 2    This document serves as a record of the services and decisions personally performed and made by Dr. Sanchez. It was created on his behalf by Katiuska Cruz, a trained medical scribe. The creation of this document is based the provider's statements to the medical scribe.  Katiuska Cruz,  9:17 AM     Objective    /68 (BP Location: Left arm, Patient Position: Chair, Cuff Size: Adult Large)   Pulse 89   Temp 97.7  F (36.5  C) (Oral)   Ht 1.473 m (4' 10\")   Wt 83 kg (183 lb)   SpO2 96%   BMI 38.25 kg/m    Body mass index is 38.25 kg/m .     Physical Exam   GENERAL: healthy, alert and no distress  EYES: Eyes grossly normal to inspection, PERRL, EOMI, sclerae white and conjunctivae normal  RESP: lungs clear to auscultation - no crackles or wheezes, no areas of dullness, no tachypnea  CV: Heart regular rate and rhythm with 2/6 systolic murmur (previously identified), no click or rub. No peripheral edema and peripheral pulses strong  MS: no gross musculoskeletal defects noted, no edema  SKIN: no suspicious lesions or rashes to visible skin  NEURO: Normal strength and tone, sensory exam grossly normal, mentation intact, oriented times 3 and cranial nerves 2-12 intact  PSYCH: mentation appears normal, affect " normal/bright    Diagnostic Test Results:  Results for orders placed or performed in visit on 03/09/20 (from the past 24 hour(s))   Hemoglobin A1c   Result Value Ref Range    Hemoglobin A1C 7.4 (H) 0 - 5.6 %           Assessment & Plan     (E11.29,  R80.9) Type 2 diabetes mellitus with microalbuminuria, without long-term current use of insulin (H)  (primary encounter diagnosis)  Comment: controlled, however her HgbA1c is the highest on record  Plan: Hemoglobin A1c, Albumin Random Urine         Quantitative with Creat Ratio        Consider starting her on medication, but she now understands the diagnosis and will work on diet changes. Return in about 6 months (around 9/9/2020) for full physical, diabetes.      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   Plan: Potassium, Creatinine, potassium citrate         (UROCIT-K) 10 MEQ (1080 MG) CR tablet            (F32.0) Current mild episode of major depressive disorder without prior episode (H)  Comment: well-controlled off medication   Plan: Monitor periodically     (I35.0) Aortic valve stenosis, etiology of cardiac valve disease unspecified  (I05.0) Mitral valve stenosis, unspecified etiology  Comment: abnormalities on echocardiogram of unknown significance. Since she has a history of HF, I would like her to meet with a Cardiologist to see if she has any specific needs. Thank you in advance.  Plan: CARDIO  ADULT REFERRAL        Copy of echo  report provided to the patient.        The information in this document, created by the medical scribe for me, accurately reflects the services I personally performed and the decisions made by me. I have reviewed and approved this document for accuracy prior to leaving the patient care area.  Terry Sanchez MD

## 2020-03-09 NOTE — LETTER
March 16, 2020      Amalia Harris  [address] [address] [facility] [address]        Dear ,    All of the rest of your test results were normal or within the expected range for you.     Please contact the clinic if you have additional questions.  Thank you.     Sincerely,       Terry Sanchez MD     Results for orders placed or performed in visit on 03/09/20   Hemoglobin A1c     Status: Abnormal   Result Value Ref Range    Hemoglobin A1C 7.4 (H) 0 - 5.6 %   Albumin Random Urine Quantitative with Creat Ratio     Status: None   Result Value Ref Range    Creatinine Urine 168 mg/dL    Albumin Urine mg/L 12 mg/L    Albumin Urine mg/g Cr 7.38 0 - 25 mg/g Cr   Lipid panel reflex to direct LDL Non-fasting     Status: Abnormal   Result Value Ref Range    Cholesterol 150 <200 mg/dL    Triglycerides 178 (H) <150 mg/dL    HDL Cholesterol 42 (L) >49 mg/dL    LDL Cholesterol Calculated 72 <100 mg/dL    Non HDL Cholesterol 108 <130 mg/dL   ALT     Status: None   Result Value Ref Range    ALT 27 0 - 50 U/L   Potassium     Status: None   Result Value Ref Range    Potassium 4.2 3.4 - 5.3 mmol/L   Creatinine     Status: None   Result Value Ref Range    Creatinine 0.91 0.52 - 1.04 mg/dL    GFR Estimate 65 >60 mL/min/[1.73_m2]    GFR Estimate If Black 75 >60 mL/min/[1.73_m2]

## 2020-03-10 ASSESSMENT — PATIENT HEALTH QUESTIONNAIRE - PHQ9: SUM OF ALL RESPONSES TO PHQ QUESTIONS 1-9: 2

## 2020-03-16 NOTE — RESULT ENCOUNTER NOTE
MsJoellen Harris,    All of the rest of your test results were normal or within the expected range for you.    Please contact the clinic if you have additional questions.  Thank you.    Sincerely,      Terry Sanchez MD

## 2020-03-26 ENCOUNTER — TELEPHONE (OUTPATIENT)
Dept: CARDIOLOGY | Facility: CLINIC | Age: 70
End: 2020-03-26

## 2020-03-26 NOTE — TELEPHONE ENCOUNTER
Message left  to son and patient . with interpretor to change visit to tele visit on 03/31/20. Left message to return clinic call to .  Chelsea Wright L.P.N.

## 2020-03-31 ENCOUNTER — VIRTUAL VISIT (OUTPATIENT)
Dept: CARDIOLOGY | Facility: CLINIC | Age: 70
End: 2020-03-31
Attending: FAMILY MEDICINE
Payer: MEDICARE

## 2020-03-31 VITALS — WEIGHT: 182 LBS | BODY MASS INDEX: 38.04 KG/M2

## 2020-03-31 DIAGNOSIS — E66.01 MORBID OBESITY (H): ICD-10-CM

## 2020-03-31 DIAGNOSIS — E78.5 HYPERLIPIDEMIA LDL GOAL <70: ICD-10-CM

## 2020-03-31 DIAGNOSIS — I10 BENIGN ESSENTIAL HYPERTENSION: ICD-10-CM

## 2020-03-31 DIAGNOSIS — I35.0 NONRHEUMATIC AORTIC VALVE STENOSIS: Primary | ICD-10-CM

## 2020-03-31 PROBLEM — N13.2 HYDRONEPHROSIS WITH URINARY OBSTRUCTION DUE TO URETERAL CALCULUS: Status: ACTIVE | Noted: 2020-03-31

## 2020-03-31 PROBLEM — N20.1 CALCULUS OF URETER: Status: ACTIVE | Noted: 2020-03-31

## 2020-03-31 PROCEDURE — 99442 ZZC PHYSICIAN TELEPHONE EVALUATION 11-20 MIN: CPT | Performed by: INTERNAL MEDICINE

## 2020-03-31 NOTE — PATIENT INSTRUCTIONS
Thank you for coming to the [facility] @ [facility]; please note the following instructions:    1. Preventive Care:    Colorectal Cancer Screening: During our visit today, we discussed that it is recommended you receive colorectal cancer screening. Please call or make an appointment with your primary care provider to discuss this. You may also call the [facility] scheduling line ([phone]) to set up a colonoscopy appointment.    2.Schedule Echocardiogram test @ # [phone] post Covid virus restrictions, then     3. Echocardiogram result will determine next clinic visit need    If you have any questions regarding your visit please contact your care team:     Cardiology  Telephone Number   Colette CARDENAS., RN  Dianna MAYFIELD,RN  Liz ALVAREZ, RAIA  Beth MACIAS, MA  Chelsea NGUYEN, MYN   [phone]   (select option 1)    *After hours: [phone]     For scheduling appts:     [phone] or    [phone] (select option 1)    *After hours: [phone]     For the Device Clinic (Pacemakers and ICD's)  RN's :  Macarena Sosa   During business hours: [phone]    *After business hours:  [phone] (select option 4)      Normal test result notifications will be released via [employer] or mailed within 7 business days.  All other test results, will be communicated via telephone once reviewed by your cardiologist.    If you need a medication refill please contact your pharmacy.  Please allow 3 business days for your refill to be completed.    As always, thank you for trusting us with your health care needs!

## 2020-03-31 NOTE — PROGRESS NOTES
"Amalia Harris is a 69 year old female who is being evaluated via a billable telephone visit.      The patient has been notified of following:     \"This telephone visit will be conducted via a call between you and your physician/provider. We have found that certain health care needs can be provided without the need for a physical exam.  This service lets us provide the care you need with a short phone conversation.  If a prescription is necessary we can send it directly to your pharmacy.  If lab work is needed we can place an order for that and you can then stop by our lab to have the test done at a later time.    If during the course of the call the physician/provider feels a telephone visit is not appropriate, you will not be charged for this service.\"     Patient has given verbal consent for Telephone visit?   Yes.    HPI:  The telephone encounter today is conducted through the help of vufind .    Amalia Harris is a 69-year-old female with past medical history significant for severe obesity, diabetes type 2 (not on medication), hypertension and hyperlipidemia.    The patient is referred to cardiology by her PCP Dr. Sanchez regarding the findings reported on echocardiogram from December 2019.    The patient reports no prior history of cardiac disease.  She has no active cardiac symptoms.  Denies chest pain, shortness of breath, palpitations,'s lower extremity swelling, dizziness or syncope.      Patient does not exercise. No smoking history.    Currently on losartan 25 mg for microalbuminuria and lovastatin 40 mg for hyperlipidemia.  Blood pressure is within normal range.  LDL last checked on 3/9/2028 is well controlled at 72 mg/DL. The patient apparently denies being diabetic.     I have reviewed the echocardiogram from December 2019.  Biventricular size and function appeared normal.  However it is very difficult to assess aortic and mitral valves.  I did not personally see any significant valvular " disease.    Echocardiogram 12/31/2019  Technically difficult study with limited windows.  Small, hyperdynamic left ventricle, LVEF of 65-70%. No wall motion  abnormalities on limited views.  Right ventricular function, chamber size, wall motion, and thickness are  normal.  Aortic stenosis is in the moderate range. Peak velocity probably overestimates  the degree of stenosis in the setting of a hyperdynamic LV.  Probable mitral stenosis due to mitral annular and valvular calcifciation.  Mean gradient of 8mmHg at 89bpm.  Estimated pulmonary artery pressure of 39 mmHg.  The inferior vena cava was normal in size with preserved respiratory  variability.  No pericardial effusion is present.     There is no prior study for direct comparison.    I have reviewed and updated the patient's Past Medical History, Social History, Family History and Medication List.    ALLERGIES  No clinical screening - see comments    Assessment/Plan:  Amalia Harris is a 69-year-old female with past medical history significant for obesity with body mass index of 38, diabetes type 2 (not on medications), hypertension and hyperlipidemia.    The patient reports no cardiac symptoms.  Her main concern is the results of the echocardiogram which I have personally reviewed.   The patient doesnot have heart failure which appear to be the major concern. Biventricular size and function are normal. The quality of the images are suboptimal to assess valvular heart disease.  Personally I did not see any significant aortic valve calcification.  The valve gradient obtained through the aortic valve (report says moderate aortic stenosis) might be due to hyperdynamic LV.  I do not think she has significant mitral stenosis as well.  Patient's prior echocardiogram from 2017 (results available in care everywhere) showed normal biventricular function with no significant valve disease.    Blood pressure and LDL are well controlled.  The patient follows with Dr. Sanchez for  diabetes.    I recommended patient to lose weight and repeat the echocardiogram over the next one year once COVID is over.  I will review the results of the echocardiogram and inform the patient. I did not make any medication changes today.    Return to clinic as needed.    Phone call duration: 17 minutes    Smiley BERMUDEZ MD  [facility] Division of Cardiology  Pager [phone]

## 2020-03-31 NOTE — LETTER
March 31, 2020      [facility]: [facility]  [address] [facility] [address]         Dear Amalia,          Thank you for coming to the [facility] @ [facility] by telephone visit; please note the following instructions:    1. Preventive Care:    Colorectal Cancer Screening: During our visit today, we discussed that it is recommended you receive colorectal cancer screening. Please call or make an appointment with your primary care provider to discuss this. You may also call the  [facility] scheduling line ([phone]) to set up a colonoscopy appointment.    2.Schedule Echocardiogram test @ # [phone] post Covid virus restrictions, then     3. Echocardiogram result will determine next clinic visit need      It was a pleasure  at your last clinic visit. Please do not hesitate to call me if you have any questions or concerns.    Sincerely,      Smiley Can   Enedelia HEARN

## 2020-04-07 ENCOUNTER — TELEPHONE (OUTPATIENT)
Dept: CARDIOLOGY | Facility: CLINIC | Age: 70
End: 2020-04-07

## 2020-04-07 NOTE — TELEPHONE ENCOUNTER
Patient request son set up My Chart , patient son not available, Message left to patient son , email address or telephone number confirmation to send My chart activation needed. Chelsea Wright L.P.N.

## 2020-07-17 ENCOUNTER — OFFICE VISIT (OUTPATIENT)
Dept: URGENT CARE | Facility: URGENT CARE | Age: 70
End: 2020-07-17
Payer: MEDICARE

## 2020-07-17 VITALS
DIASTOLIC BLOOD PRESSURE: 78 MMHG | BODY MASS INDEX: 38.21 KG/M2 | RESPIRATION RATE: 16 BRPM | SYSTOLIC BLOOD PRESSURE: 112 MMHG | OXYGEN SATURATION: 100 % | WEIGHT: 182.8 LBS | HEART RATE: 103 BPM | TEMPERATURE: 98.5 F

## 2020-07-17 DIAGNOSIS — N30.00 ACUTE CYSTITIS WITHOUT HEMATURIA: ICD-10-CM

## 2020-07-17 DIAGNOSIS — R30.0 DYSURIA: Primary | ICD-10-CM

## 2020-07-17 DIAGNOSIS — R82.90 NONSPECIFIC FINDING ON EXAMINATION OF URINE: ICD-10-CM

## 2020-07-17 LAB
ALBUMIN UR-MCNC: NEGATIVE MG/DL
APPEARANCE UR: ABNORMAL
BACTERIA #/AREA URNS HPF: ABNORMAL /HPF
BILIRUB UR QL STRIP: NEGATIVE
COLOR UR AUTO: YELLOW
GLUCOSE UR STRIP-MCNC: NEGATIVE MG/DL
HGB UR QL STRIP: ABNORMAL
KETONES UR STRIP-MCNC: NEGATIVE MG/DL
LEUKOCYTE ESTERASE UR QL STRIP: ABNORMAL
NITRATE UR QL: NEGATIVE
NON-SQ EPI CELLS #/AREA URNS LPF: ABNORMAL /LPF
PH UR STRIP: 6.5 PH (ref 5–7)
RBC #/AREA URNS AUTO: ABNORMAL /HPF
SOURCE: ABNORMAL
SP GR UR STRIP: 1.01 (ref 1–1.03)
UROBILINOGEN UR STRIP-ACNC: 2 EU/DL (ref 0.2–1)
WBC #/AREA URNS AUTO: ABNORMAL /HPF
WBC CLUMPS #/AREA URNS HPF: PRESENT /HPF

## 2020-07-17 PROCEDURE — 81001 URINALYSIS AUTO W/SCOPE: CPT | Performed by: PHYSICIAN ASSISTANT

## 2020-07-17 PROCEDURE — 99213 OFFICE O/P EST LOW 20 MIN: CPT | Performed by: PHYSICIAN ASSISTANT

## 2020-07-17 PROCEDURE — 87186 SC STD MICRODIL/AGAR DIL: CPT | Performed by: PHYSICIAN ASSISTANT

## 2020-07-17 PROCEDURE — 87086 URINE CULTURE/COLONY COUNT: CPT | Performed by: PHYSICIAN ASSISTANT

## 2020-07-17 PROCEDURE — 87088 URINE BACTERIA CULTURE: CPT | Performed by: PHYSICIAN ASSISTANT

## 2020-07-17 RX ORDER — PHENAZOPYRIDINE HYDROCHLORIDE 95 MG/1
190 TABLET ORAL 3 TIMES DAILY
Qty: 42 TABLET | Refills: 0 | Status: SHIPPED | OUTPATIENT
Start: 2020-07-17 | End: 2020-07-24

## 2020-07-17 RX ORDER — CEPHALEXIN 500 MG/1
500 CAPSULE ORAL 3 TIMES DAILY
Qty: 21 CAPSULE | Refills: 0 | Status: SHIPPED | OUTPATIENT
Start: 2020-07-17 | End: 2020-07-24

## 2020-07-17 ASSESSMENT — ENCOUNTER SYMPTOMS
NEUROLOGICAL NEGATIVE: 1
GASTROINTESTINAL NEGATIVE: 1
MUSCULOSKELETAL NEGATIVE: 1
CARDIOVASCULAR NEGATIVE: 1
CONSTITUTIONAL NEGATIVE: 1
DYSURIA: 1
PSYCHIATRIC NEGATIVE: 1
FREQUENCY: 1
EYES NEGATIVE: 1
FLANK PAIN: 0
HEMATURIA: 0
RESPIRATORY NEGATIVE: 1

## 2020-07-17 NOTE — PROGRESS NOTES
SUBJECTIVE:   Amalia Harris is a 70 year old female presenting with a chief complaint of   Chief Complaint   Patient presents with     UTI     sx for 4 days-tried to drink alot of water and pressure at the end of urinating        She is an established patient of [facility].    UTI    Onset of symptoms was 3day(s).  Course of illness is worsening  Severity moderate  Current and associated symptoms dysuria, frequency, urgency, burning and suprapubic pain and pressure  Treatment and measures tried None  Predisposing factors include none  Patient denies rigors, flank pain, temperature > 101 degrees F., vomiting, taking Coumadin, GFR less than 30 within the last year, vaginal discharge, vaginal odor, vaginal itching and dyspareunia        Review of Systems   Constitutional: Negative.    HENT: Negative.    Eyes: Negative.    Respiratory: Negative.    Cardiovascular: Negative.    Gastrointestinal: Negative.    Genitourinary: Positive for decreased urine volume, dysuria and frequency. Negative for enuresis, flank pain, genital sores, hematuria, menstrual problem, pelvic pain, vaginal bleeding, vaginal discharge and vaginal pain.   Musculoskeletal: Negative.    Skin: Negative.    Neurological: Negative.    Psychiatric/Behavioral: Negative.        Past Medical History:   Diagnosis Date     Guaiac positive stools 6/6/2016     Hyperlipidemia LDL goal <130 3/23/2011     Hypertension      Major depression 3/23/2011     Nonsenile cataract      Osteoarthritis 3/23/2011     Family History   Problem Relation Age of Onset     Respiratory Father      Hyperlipidemia Father      Hyperlipidemia Sister      Cancer Brother         throat or esophageal (alcohol)     Hypertension No family hx of      Cerebrovascular Disease No family hx of      Coronary Artery Disease No family hx of      Diabetes No family hx of      Anesthesia Reaction No family hx of      Bleeding Disorder No family hx of      Thrombophilia No family hx of      Current Outpatient  Medications   Medication Sig Dispense Refill     losartan (COZAAR) 25 MG tablet Take 1 tablet (25 mg) by mouth daily for blood pressure & heart. 90 tablet 1     lovastatin (MEVACOR) 40 MG tablet Take 1 tablet (40 mg) by mouth every evening for cholesterol. 90 tablet 1     potassium citrate (UROCIT-K) 10 MEQ (1080 MG) CR tablet Take 3 tablets (30 mEq) by mouth 2 times daily for kidney stone prevention. 540 tablet 1     tetrahydrozoline (VISINE) 0.05 % ophthalmic solution Place 1 drop into both eyes as needed       Social History     Tobacco Use     Smoking status: Never Smoker     Smokeless tobacco: Never Used   Substance Use Topics     Alcohol use: No     Alcohol/week: 0.0 standard drinks       OBJECTIVE  /78 (BP Location: Left arm, Patient Position: Sitting, Cuff Size: Adult Large)   Pulse 103   Temp 98.5  F (36.9  C) (Tympanic)   Resp 16   Wt 82.9 kg (182 lb 12.8 oz)   SpO2 100%   BMI 38.21 kg/m      Physical Exam  Constitutional:       General: She is not in acute distress.     Appearance: Normal appearance. She is normal weight. She is not ill-appearing, toxic-appearing or diaphoretic.   HENT:      Head: Normocephalic and atraumatic.   Cardiovascular:      Rate and Rhythm: Normal rate and regular rhythm.      Pulses: Normal pulses.      Heart sounds: Normal heart sounds. No murmur. No friction rub. No gallop.    Pulmonary:      Effort: Pulmonary effort is normal. No respiratory distress.      Breath sounds: Normal breath sounds. No stridor. No wheezing, rhonchi or rales.   Chest:      Chest wall: No tenderness.   Abdominal:      General: Abdomen is flat. Bowel sounds are normal.      Palpations: Abdomen is soft.   Neurological:      Mental Status: She is alert.         ASSESSMENT/PLAN:      ICD-10-CM    1. Dysuria  R30.0 *UA reflex to Microscopic and Culture ([facility] (except [place] and [place])        (R30.0) Dysuria  (primary encounter diagnosis)  Plan: *UA reflex to  Microscopic and Culture ([facility]         and [facility] (except [place] and         [place]), Urine Microscopic, Urine Culture         Aerobic Bacterial, cephALEXin (KEFLEX) 500 MG         capsule, phenazopyridine (AZO URINARY PAIN         RELIEF) 95 MG tablet    (R82.90) Nonspecific finding on examination of urine  Plan: Urine Culture Aerobic Bacterial, cephALEXin         (KEFLEX) 500 MG capsule, phenazopyridine (AZO         URINARY PAIN RELIEF) 95 MG tablet    (N30.00) Acute cystitis without hematuria  Plan: cephALEXin (KEFLEX) 500 MG capsule,         phenazopyridine (AZO URINARY PAIN RELIEF) 95 MG        tablet    Patient should drink 1.5-2 liters of water per day. Okay to use Azo over the counter and/or cranberry juice for symptomatic relief (Note that Azo will cause urine to become orange/red. If you are a contact lens-wearer, contacts may become discolored.This is normal). Okay to continue taking prescribed antibiotic for full course. Patient was advised to return to clinic if symptoms do not improve in the amount of time specified in the AVS or if symptoms worsen. Patient educated on red flag symptoms and asked to go directly to the ED if symptoms present themselves.      Patient given handout on how to prevent UTIs in the future.    Patient was advised to return to clinic if symptoms do not improve in the amount of time specified in the AVS or if symptoms worsen. Patient educated on red flag symptoms and asked to go directly to the ED if symptoms present themselves.     Joe Nieves PA-C on 7/17/2020 at 12:34 PM

## 2020-07-19 ENCOUNTER — NURSE TRIAGE (OUTPATIENT)
Dept: NURSING | Facility: CLINIC | Age: 70
End: 2020-07-19

## 2020-07-19 ENCOUNTER — TELEPHONE (OUTPATIENT)
Dept: URGENT CARE | Facility: URGENT CARE | Age: 70
End: 2020-07-19

## 2020-07-19 LAB
BACTERIA SPEC CULT: ABNORMAL
Lab: ABNORMAL
SPECIMEN SOURCE: ABNORMAL

## 2020-07-19 NOTE — TELEPHONE ENCOUNTER
I called to tell the patient that her culture results were back and that per Dr. Mclean she can continue on her current course of antibiotics, but the patient was not available so I left a basic voicemail message for her to call back.     Donna Loera MA      ----- Message from Oumar Mclean MD sent at 7/19/2020  9:05 AM CDT -----  Was treated with Kelfex  She is good

## 2020-07-19 NOTE — TELEPHONE ENCOUNTER
Son calling; patient not present but Consent To Communicate on file.  States returning a call to Donna from earlier this date.  FNA read son the message dated 7/19/20 at 12:56PM.

## 2020-07-31 ENCOUNTER — TELEPHONE (OUTPATIENT)
Dept: CARDIOLOGY | Facility: CLINIC | Age: 70
End: 2020-07-31

## 2020-07-31 NOTE — TELEPHONE ENCOUNTER
Echocardiogram needed per Smiley Kamara MD.,Cardiology  Work que note Chelsea Wright L.P.N.  Left message to return clinic call to  with [facility] interpretor.  Chelsea Wright L.P.N.

## 2020-09-29 ENCOUNTER — VIRTUAL VISIT (OUTPATIENT)
Dept: FAMILY MEDICINE | Facility: CLINIC | Age: 70
End: 2020-09-29
Payer: MEDICARE

## 2020-09-29 DIAGNOSIS — J12.82 PNEUMONIA DUE TO 2019 NOVEL CORONAVIRUS: Primary | ICD-10-CM

## 2020-09-29 DIAGNOSIS — R80.9 TYPE 2 DIABETES MELLITUS WITH MICROALBUMINURIA, WITHOUT LONG-TERM CURRENT USE OF INSULIN (H): ICD-10-CM

## 2020-09-29 DIAGNOSIS — E11.29 TYPE 2 DIABETES MELLITUS WITH MICROALBUMINURIA, WITHOUT LONG-TERM CURRENT USE OF INSULIN (H): ICD-10-CM

## 2020-09-29 DIAGNOSIS — J02.9 SORE THROAT: ICD-10-CM

## 2020-09-29 DIAGNOSIS — U07.1 PNEUMONIA DUE TO 2019 NOVEL CORONAVIRUS: Primary | ICD-10-CM

## 2020-09-29 PROCEDURE — 99214 OFFICE O/P EST MOD 30 MIN: CPT | Mod: 95 | Performed by: FAMILY MEDICINE

## 2020-09-29 RX ORDER — METFORMIN HCL 500 MG
2000 TABLET, EXTENDED RELEASE 24 HR ORAL
Qty: 360 TABLET | Refills: 0 | Status: SHIPPED | OUTPATIENT
Start: 2020-09-29 | End: 2020-11-30

## 2020-09-29 RX ORDER — AMOXICILLIN 500 MG/1
500 CAPSULE ORAL 3 TIMES DAILY
Qty: 30 CAPSULE | Refills: 0 | Status: SHIPPED | OUTPATIENT
Start: 2020-09-29 | End: 2020-10-09

## 2020-09-29 RX ORDER — BLOOD SUGAR DIAGNOSTIC
1 STRIP MISCELLANEOUS
COMMUNITY
Start: 2020-09-27 | End: 2020-11-30

## 2020-09-29 RX ORDER — DEXAMETHASONE 4 MG/1
1.5 TABLET ORAL DAILY
COMMUNITY
Start: 2020-09-27 | End: 2020-09-29

## 2020-09-29 RX ORDER — FAMOTIDINE 20 MG/1
20 TABLET, FILM COATED ORAL 2 TIMES DAILY
Qty: 180 TABLET | Refills: 1 | Status: SHIPPED | OUTPATIENT
Start: 2020-09-29 | End: 2023-10-30

## 2020-09-29 RX ORDER — FAMOTIDINE 20 MG/1
20 TABLET, FILM COATED ORAL 2 TIMES DAILY
COMMUNITY
Start: 2020-09-27 | End: 2020-09-29

## 2020-09-29 NOTE — PROGRESS NOTES
"Amalia Harris is a 70 year old female who is being evaluated via a billable telephone visit.      The patient has been notified of following:     \"This telephone visit will be conducted via a call between you and your physician. We have found that certain health care needs can be provided without the need for a physical exam.  This service lets us provide the care you need with a short phone conversation.  If a prescription is necessary we can send it directly to your pharmacy.  If lab work is needed we can place an order for that and you can then stop by our lab to have the test done at a later time.    Telephone visits are billed at different rates depending on your insurance coverage. During this emergency period, for some insurers they may be billed the same as an in-person visit.  Please reach out to your insurance provider with any questions.  If during the course of the call the physician feels a telephone visit is not appropriate, you will not be charged for this service.\"    Patient has given verbal consent for Telephone visit?  Yes    What phone number would you like to be contacted at? [phone] (son's cell), [phone]    How would you like to obtain your AVS? Mail a copy      SUBJECTIVE:    Amalia Harris is a 70 year old female who prevents via telephone visit today for the following issue(s). She is accompanied by her  (our phone service).     HPI:      Hospital Follow-up Visit:    Hospital/Nursing Home/IP Rehab Facility: [facility]  Date of Admission: 9/21/20  Date of Discharge: 9/27/20  Reason(s) for Admission: COVID-19 Pneumonia, acute hypoxic resp failure      Was your hospitalization related to COVID-19? YES   How are you feeling today? still very tired from being in hospital  In the past 24 hours have you had shortness of breath when speaking, walking, or climbing stairs? My breathing issues have improved  Do you have a cough? I don't have a cough  When is the last time you had a " fever greater than 100? During hospitalization  Are you having any other symptoms? Fatigue and itchy throat, ears plugged. Also, during hospitalization, developed facial swelling and numb feeling [from Decadron?]     Was the patient in the ICU or did the patient experience delirium during hospitalization?  No          Problems taking medications regularly:  None  Medication changes since discharge: finishing course of dexamethasone, start metformin  Problems adhering to non-medication therapy:  She     Summary of hospitalization:  CareEverywhere information obtained and reviewed  Diagnostic Tests/Treatments reviewed.  Follow up needed: Regarding her uncontrolled diabetes  Other Healthcare Providers Involved in Patient s Care:         None  Update since discharge: improved.  Additional issues: Sore throat       Post Discharge Medication Reconciliation: discharge medications reconciled and changed, per note/orders.  Plan of care communicated with patient                Past medical, family, and social histories, medications, and allergies are reviewed and updated in Epic.  Allergies: No clinical screening - see comments    Review Of Systems:   Constitutional, HEENT, cardiovascular, pulmonary, gi and gu systems are negative, except as otherwise noted.    Objective:         There were no vitals taken for this visit., since this is a telephone visit.  healthy, alert and no distress  RESP: No cough, no audible wheezing, able to talk in full sentences.  PSYCH: Alert and oriented times 3; coherent speech, normal   rate and volume, able to articulate logical thoughts, able   to abstract reason, no tangential thoughts, no hallucinations   or delusions, and affect is normal and pleasant  The remainder of the exam cannot be completed due to this being a telephone visit.      Diagnostic Test Results:    HgbA1c = 10.5    =====    ASSESSMENT/PLAN:  (U07.1,  J12.89) Pneumonia due to 2019 novel coronavirus  (primary encounter  diagnosis)  Comment: Clinically improved, probably resolved, although she does have the typical post viral fatigue  Plan: famotidine (PEPCID) 20 MG tablet        Follow-up as needed    (E11.29,  R80.9) Type 2 diabetes mellitus with microalbuminuria, without long-term current use of insulin (H)  Comment: Uncontrolled based on hospital lab work.  They started her on Metformin  Plan: metFORMIN (GLUCOPHAGE-XR) 500 MG 24 hr tablet        Return in about 11 weeks (around 12/15/2020) for Medicare annual wellness exam and diabetes check.      (J02.9) Sore throat  Comment: We can treat empirically for strep  Plan: amoxicillin (AMOXIL) 500 MG capsule        Follow-up for exam if symptoms do not resolve within 10 days.    Phone call duration:  29 minutes    Terry Sanchez MD

## 2020-10-16 DIAGNOSIS — I11.0 BENIGN HYPERTENSIVE HEART DISEASE WITH CONGESTIVE HEART FAILURE (H): ICD-10-CM

## 2020-10-20 RX ORDER — LOSARTAN POTASSIUM 25 MG/1
25 TABLET ORAL DAILY
Qty: 90 TABLET | Refills: 0 | Status: SHIPPED | OUTPATIENT
Start: 2020-10-20 | End: 2020-11-30

## 2020-10-20 NOTE — TELEPHONE ENCOUNTER
Prescription approved per [facility] Refill Protocol.  Haleigh Santana RN  [facility]

## 2020-10-22 DIAGNOSIS — E78.5 HYPERLIPIDEMIA LDL GOAL <100: ICD-10-CM

## 2020-10-22 DIAGNOSIS — R82.991 HYPOCITRATURIA: ICD-10-CM

## 2020-10-22 DIAGNOSIS — I11.0 BENIGN HYPERTENSIVE HEART DISEASE WITH CONGESTIVE HEART FAILURE (H): ICD-10-CM

## 2020-10-22 RX ORDER — LOVASTATIN 40 MG
40 TABLET ORAL EVERY EVENING
Qty: 90 TABLET | Refills: 1 | Status: SHIPPED | OUTPATIENT
Start: 2020-10-22 | End: 2021-04-19

## 2020-10-22 NOTE — TELEPHONE ENCOUNTER
Routing refill request to provider for review/approval because:  Drug not on the [facility] refill protocol   Heena Antoine BSN, RN

## 2020-10-27 RX ORDER — POTASSIUM CITRATE 10 MEQ/1
30 TABLET, EXTENDED RELEASE ORAL 2 TIMES DAILY
Qty: 540 TABLET | Refills: 0 | Status: SHIPPED | OUTPATIENT
Start: 2020-10-27 | End: 2020-11-30

## 2020-11-30 ENCOUNTER — OFFICE VISIT (OUTPATIENT)
Dept: FAMILY MEDICINE | Facility: CLINIC | Age: 70
End: 2020-11-30
Payer: MEDICARE

## 2020-11-30 VITALS
SYSTOLIC BLOOD PRESSURE: 133 MMHG | HEIGHT: 58 IN | TEMPERATURE: 98.2 F | BODY MASS INDEX: 37.99 KG/M2 | OXYGEN SATURATION: 97 % | DIASTOLIC BLOOD PRESSURE: 75 MMHG | HEART RATE: 75 BPM | WEIGHT: 181 LBS

## 2020-11-30 DIAGNOSIS — H10.13 ALLERGIC CONJUNCTIVITIS, BILATERAL: ICD-10-CM

## 2020-11-30 DIAGNOSIS — Z12.11 COLON CANCER SCREENING: ICD-10-CM

## 2020-11-30 DIAGNOSIS — Z12.31 BREAST CANCER SCREENING BY MAMMOGRAM: ICD-10-CM

## 2020-11-30 DIAGNOSIS — Z23 NEED FOR VACCINATION: ICD-10-CM

## 2020-11-30 DIAGNOSIS — I11.0 BENIGN HYPERTENSIVE HEART DISEASE WITH CONGESTIVE HEART FAILURE (H): ICD-10-CM

## 2020-11-30 DIAGNOSIS — E11.29 TYPE 2 DIABETES MELLITUS WITH MICROALBUMINURIA, WITHOUT LONG-TERM CURRENT USE OF INSULIN (H): Primary | ICD-10-CM

## 2020-11-30 DIAGNOSIS — R82.991 HYPOCITRATURIA: ICD-10-CM

## 2020-11-30 DIAGNOSIS — R80.9 TYPE 2 DIABETES MELLITUS WITH MICROALBUMINURIA, WITHOUT LONG-TERM CURRENT USE OF INSULIN (H): Primary | ICD-10-CM

## 2020-11-30 DIAGNOSIS — E78.5 HYPERLIPIDEMIA LDL GOAL <100: ICD-10-CM

## 2020-11-30 LAB
ALT SERPL W P-5'-P-CCNC: 27 U/L (ref 0–50)
ANION GAP SERPL CALCULATED.3IONS-SCNC: 2 MMOL/L (ref 3–14)
BUN SERPL-MCNC: 23 MG/DL (ref 7–30)
CALCIUM SERPL-MCNC: 9.1 MG/DL (ref 8.5–10.1)
CHLORIDE SERPL-SCNC: 108 MMOL/L (ref 94–109)
CHOLEST SERPL-MCNC: 160 MG/DL
CO2 SERPL-SCNC: 32 MMOL/L (ref 20–32)
CREAT SERPL-MCNC: 0.93 MG/DL (ref 0.52–1.04)
ERYTHROCYTE [DISTWIDTH] IN BLOOD BY AUTOMATED COUNT: 14.3 % (ref 10–15)
GFR SERPL CREATININE-BSD FRML MDRD: 63 ML/MIN/{1.73_M2}
GLUCOSE SERPL-MCNC: 110 MG/DL (ref 70–99)
HBA1C MFR BLD: 6.4 % (ref 0–5.6)
HCT VFR BLD AUTO: 38.4 % (ref 35–47)
HDLC SERPL-MCNC: 63 MG/DL
HGB BLD-MCNC: 12.3 G/DL (ref 11.7–15.7)
LDLC SERPL CALC-MCNC: 57 MG/DL
MCH RBC QN AUTO: 30.3 PG (ref 26.5–33)
MCHC RBC AUTO-ENTMCNC: 32 G/DL (ref 31.5–36.5)
MCV RBC AUTO: 95 FL (ref 78–100)
NONHDLC SERPL-MCNC: 97 MG/DL
PLATELET # BLD AUTO: 269 10E9/L (ref 150–450)
POTASSIUM SERPL-SCNC: 4.3 MMOL/L (ref 3.4–5.3)
RBC # BLD AUTO: 4.06 10E12/L (ref 3.8–5.2)
SODIUM SERPL-SCNC: 142 MMOL/L (ref 133–144)
TRIGL SERPL-MCNC: 201 MG/DL
WBC # BLD AUTO: 11.5 10E9/L (ref 4–11)

## 2020-11-30 PROCEDURE — G0008 ADMIN INFLUENZA VIRUS VAC: HCPCS | Performed by: FAMILY MEDICINE

## 2020-11-30 PROCEDURE — 85027 COMPLETE CBC AUTOMATED: CPT | Performed by: FAMILY MEDICINE

## 2020-11-30 PROCEDURE — 99214 OFFICE O/P EST MOD 30 MIN: CPT | Mod: 25 | Performed by: FAMILY MEDICINE

## 2020-11-30 PROCEDURE — 80061 LIPID PANEL: CPT | Performed by: FAMILY MEDICINE

## 2020-11-30 PROCEDURE — 36415 COLL VENOUS BLD VENIPUNCTURE: CPT | Performed by: FAMILY MEDICINE

## 2020-11-30 PROCEDURE — 83036 HEMOGLOBIN GLYCOSYLATED A1C: CPT | Performed by: FAMILY MEDICINE

## 2020-11-30 PROCEDURE — 90662 IIV NO PRSV INCREASED AG IM: CPT | Performed by: FAMILY MEDICINE

## 2020-11-30 PROCEDURE — 80048 BASIC METABOLIC PNL TOTAL CA: CPT | Performed by: FAMILY MEDICINE

## 2020-11-30 PROCEDURE — 84460 ALANINE AMINO (ALT) (SGPT): CPT | Performed by: FAMILY MEDICINE

## 2020-11-30 RX ORDER — BLOOD SUGAR DIAGNOSTIC
1 STRIP MISCELLANEOUS DAILY
Qty: 100 STRIP | Refills: 3 | Status: SHIPPED | OUTPATIENT
Start: 2020-11-30 | End: 2023-12-20

## 2020-11-30 RX ORDER — METFORMIN HCL 500 MG
2000 TABLET, EXTENDED RELEASE 24 HR ORAL
Qty: 360 TABLET | Refills: 0 | Status: SHIPPED | OUTPATIENT
Start: 2020-11-30 | End: 2021-05-11

## 2020-11-30 RX ORDER — LOSARTAN POTASSIUM 25 MG/1
25 TABLET ORAL DAILY
Qty: 90 TABLET | Refills: 0 | Status: SHIPPED | OUTPATIENT
Start: 2020-11-30 | End: 2021-04-19

## 2020-11-30 RX ORDER — OLOPATADINE HYDROCHLORIDE 1 MG/ML
1 SOLUTION/ DROPS OPHTHALMIC 2 TIMES DAILY
Qty: 1 BOTTLE | Refills: 1 | Status: SHIPPED | OUTPATIENT
Start: 2020-11-30 | End: 2023-10-30

## 2020-11-30 RX ORDER — POTASSIUM CITRATE 10 MEQ/1
30 TABLET, EXTENDED RELEASE ORAL 2 TIMES DAILY
Qty: 540 TABLET | Refills: 0 | Status: SHIPPED | OUTPATIENT
Start: 2020-11-30 | End: 2021-08-02

## 2020-11-30 ASSESSMENT — MIFFLIN-ST. JEOR: SCORE: 1240.76

## 2020-11-30 NOTE — LETTER
December 15, 2020      Amalia Harris  [address] [facility] [address] [address]        Dear ,    All of the rest of your test results were normal or within expected ranges for you.     Please contact the clinic if you have additional questions.  Thank you.     Sincerely,       Terry Sanchez MD         Resulted Orders   Hemoglobin A1c   Result Value Ref Range    Hemoglobin A1C 6.4 (H) 0 - 5.6 %      Comment:      Normal <5.7% Prediabetes 5.7-6.4%  Diabetes 6.5% or higher - adopted from ADA   consensus guidelines.     Lipid panel reflex to direct LDL Non-fasting   Result Value Ref Range    Cholesterol 160 <200 mg/dL    Triglycerides 201 (H) <150 mg/dL      Comment:      Borderline high:  150-199 mg/dl  High:             200-499 mg/dl  Very high:       >499 mg/dl      HDL Cholesterol 63 >49 mg/dL    LDL Cholesterol Calculated 57 <100 mg/dL      Comment:      Desirable:       <100 mg/dl    Non HDL Cholesterol 97 <130 mg/dL   ALT   Result Value Ref Range    ALT 27 0 - 50 U/L   Basic metabolic panel   Result Value Ref Range    Sodium 142 133 - 144 mmol/L    Potassium 4.3 3.4 - 5.3 mmol/L    Chloride 108 94 - 109 mmol/L    Carbon Dioxide 32 20 - 32 mmol/L    Anion Gap 2 (L) 3 - 14 mmol/L    Glucose 110 (H) 70 - 99 mg/dL    Urea Nitrogen 23 7 - 30 mg/dL    Creatinine 0.93 0.52 - 1.04 mg/dL    GFR Estimate 63 >60 mL/min/[1.73_m2]      Comment:      Non  GFR Calc  Starting 12/18/2018, serum creatinine based estimated GFR (eGFR) will be   calculated using the Chronic Kidney Disease Epidemiology Collaboration   (CKD-EPI) equation.      GFR Estimate If Black 73 >60 mL/min/[1.73_m2]      Comment:       GFR Calc  Starting 12/18/2018, serum creatinine based estimated GFR (eGFR) will be   calculated using the Chronic Kidney Disease Epidemiology Collaboration   (CKD-EPI) equation.      Calcium 9.1 8.5 - 10.1 mg/dL   CBC with platelets   Result Value Ref Range    WBC 11.5 (H) 4.0 - 11.0 10e9/L     RBC Count 4.06 3.8 - 5.2 10e12/L    Hemoglobin 12.3 11.7 - 15.7 g/dL    Hematocrit 38.4 35.0 - 47.0 %    MCV 95 78 - 100 fl    MCH 30.3 26.5 - 33.0 pg    MCHC 32.0 31.5 - 36.5 g/dL    RDW 14.3 10.0 - 15.0 %    Platelet Count 269 150 - 450 10e9/L

## 2020-11-30 NOTE — PATIENT INSTRUCTIONS
Preventive Health Recommendations    See your health care provider every year to    Review health changes.     Discuss preventive care.      Review your medicines if your doctor has prescribed any.      You no longer need a yearly Pap test unless you've had an abnormal Pap test in the past 10 years. If you have vaginal symptoms, such as bleeding or discharge, be sure to talk with your provider about a Pap test.      Every 1 to 2 years, have a mammogram.  If you are over 69, talk with your health care provider about whether or not you want to continue having screening mammograms.      Every 10 years, have a colonoscopy. Or, have a yearly FIT test (stool test). These exams will check for colon cancer.       Have a cholesterol test every 5 years, or more often if your doctor advises it.       Have a diabetes test (fasting glucose) every three years. If you are at risk for diabetes, you should have this test more often.       At age 65, have a bone density scan (DEXA) to check for osteoporosis (brittle bone disease).    Shots:    Get a flu shot each year.    Get a tetanus shot every 10 years.    Talk to your doctor about your pneumonia vaccines. There are now two you should receive - Pneumovax (PPSV 23) and Prevnar (PCV 13).    Talk to your pharmacist about the shingles vaccine.    Talk to your doctor about the hepatitis B vaccine.    Nutrition:     Eat at least 5 servings of fruits and vegetables each day.      Eat whole-grain bread, whole-wheat pasta and brown rice instead of white grains and rice.      Get adequate about Calcium and Vitamin D.     Lifestyle    Exercise at least 150 minutes a week (30 minutes a day, 5 days a week). This will help you control your weight and prevent disease.      Limit alcohol to one drink per day.      No smoking.       Wear sunscreen to prevent skin cancer.       See your dentist twice a year for an exam and cleaning.      See your eye doctor every 1 to 2 years to screen for  conditions such as glaucoma, macular degeneration, cataracts, etc.    Personalized Prevention Plan  You are due for the preventive services outlined below.  Your care team is available to assist you in scheduling these services.  If you have already completed any of these items, please share that information with your care team to update in your medical record.    Health Maintenance Due   Topic Date Due     Heart Failure Action Plan  1950     Diabetic Foot Exam  1950     Zoster (Shingles) Vaccine (1 of 2) 04/06/2002     Colorectal Cancer Screening  12/24/2019     Flu Vaccine (1) 09/01/2020     FALL RISK ASSESSMENT  09/05/2020     Depression Assessment  09/09/2020     Mammogram  12/07/2020     Eye Exam  12/23/2020     Patient Education     Canker Sore    A canker sore (also called an aphthous ulcer) is a painful sore on the lining of the mouth. It is most painful during the first few days, and it lasts about 7 to 14 days before going away.  Causes  Canker sores are not cold sores or fever blisters. They are not contagious, so they are not spread by contact. The exact cause of canker sores is not clear, but there are a number of things that can trigger them in different people.    Mild injury, such as biting the inside of the mouth, lip, or cheek, or dental procedures    Stress    Poor diet, or lack of certain nutrients, including B vitamins and iron    Foods that can irritate the mouth, including tomatoes, citrus fruits, and some nuts (foods that are acidic or contain bitter substances called tannins)    Irritating chemicals, such as those in some toothpastes and mouthwashes    Certain chronic illnesses  Symptoms  Canker sores are found on the lining of the mouth. They can be inside the cheeks or lips, on the roof of the mouth, at the base of the gums, on the tongue, or in the back of the throat. Canker sores typically have these characteristics:    Small, flat (not raised) sores    Can be white or  yellowish bumps that are red around the edges or have a red halo    Usually small in size, roundish, and in groups    Accompanied by pain or burning  Canker sores don't leave a scar. But they usually come back.  Home care  The goals of canker sore treatment are to decrease the pain, speed healing, and prevent sores from coming back. No single treatment works for everyone. Try a number of methods to see what works best.  General care    You may find that soft, easy-to-chew foods cause less pain. Use a straw to direct liquids away from the sore.    Use a soft-bristle toothbrush, and brush your teeth gently.    Don t eat acidic, salty, or spicy foods.    Don t eat crusty or crunchy foods such as French bread or potato chips. These kinds of foods can hurt the inside of your mouth, or scrape your existing canker sores.  Medicines  You can try over-the-counter medicines that cover the sores and numb them. This protects the sores while they heal and helps reduce pain.  Homemade rinses and solutions  You can use these solutions as mouth rinses. Spit them out after using them. You can also dab them on the sores. You can repeat these treatments as often as needed.    Rinse your mouth with saltwater.    Mix equal amounts of hydrogen peroxide and water. You can use this as a mouthwash or dab it on spots with a cotton swab. You can also add sodium bicarbonate to this to make a paste, and then dab it on spots.  Follow-up care  Follow up with your healthcare provider, or as advised.    If a culture was done, you will be notified if the treatment needs to be changed. You can call as directed for the results.  Call 911  Call 911 if any of these occur:    Trouble breathing    Inability to swallow    Extreme drowsiness or trouble waking up    Fainting or loss of consciousness    Rapid heart rate    Seizure    Stiff neck  When to seek medical advice  Call your healthcare provider right away if any of these occur:    You have a fever of  100.4 F (38 C) or higher, or as directed by your provider    You are pregnant    You just had surgery or another medical procedure, or you were just discharged from the hospital    It's too painful to eat or swallow  Lindsey last reviewed this educational content on 10/1/2017    6442-6310 [employer]. [address] [place] [address], [place] [address]. All rights reserved. This information is not intended as a substitute for professional medical care. Always follow your healthcare professional's instructions.           Please call [facility]: [phone] ([place] or [place]) or [facility]: [phone] ([place], [facility]) to schedule your ,a,,pofts.

## 2020-11-30 NOTE — PROGRESS NOTES
Subjective     Amalia Harris is a 70 year old female who presents to clinic today for the following health issues. She is accompanied by her son who interprets for the patient. :    HPI         Acute Illness  Acute illness concerns:  Dry itchy eyes ongoing over the period of weather changing from fall to winter; numbness/sensitivity/minor bumps of tip tongue for past few     Onset/Duration: current symptoms have been ongoing for the last 3-4 days. Patient was diagnosed with COVID-19 in September, believes this is something unrelated;   Symptoms:  Fever: no  Chills/Sweats: no  Headache (location?): no  Sinus Pressure: no  Conjunctivitis:  no  Ear Pain: no  Rhinorrhea: no  Congestion: no  Sore Throat: YES  Cough: no  Wheeze: no  Decreased Appetite: no  Nausea: no  Vomiting: no  Diarrhea: no  Dysuria/Freq.: no  Dysuria or Hematuria: no  Fatigue/Achiness: no  Sick/Strep Exposure: no  Therapies tried and outcome: Motirn with mild relief for sore throat and pepto for stomach problems          Diabetes Follow-up      How often are you checking your blood sugar? Not at all, doesn't have a meter    What concerns do you have today about your diabetes? None     Do you have any of these symptoms? (Select all that apply)  No numbness or tingling in feet.  No redness, sores or blisters on feet.  No complaints of excessive thirst.  No reports of blurry vision.  No significant changes to weight.    Have you had a diabetic eye exam in the last 12 months? Yes- Date of last eye exam: 12/23/19,  Location: [facility] (Dr. Clancy)        Hyperlipidemia Follow-Up      Are you regularly taking any medication or supplement to lower your cholesterol?   Yes- lovastatin    Are you having muscle aches or other side effects that you think could be caused by your cholesterol lowering medication?  No    Hypertension Follow-up      Do you check your blood pressure regularly outside of the clinic? No     Are you following a low salt diet? Not  "sure    Are your blood pressures ever more than 140 on the top number (systolic) OR more   than 90 on the bottom number (diastolic), for example 140/90? No    BP Readings from Last 2 Encounters:   11/30/20 133/75   07/17/20 112/78     Hemoglobin A1C (%)   Date Value   11/30/2020 6.4 (H)   03/09/2020 7.4 (H)     LDL Cholesterol Calculated (mg/dL)   Date Value   03/09/2020 72   09/05/2019 127 (H)         Past medical, family, and social histories, medications, and allergies are reviewed and updated in [facility].     Review of Systems   Constitutional, HEENT, cardiovascular, pulmonary, gi and gu systems are negative, except as otherwise noted.    OBJECTIVE:   /75 (BP Location: Left arm, Patient Position: Chair, Cuff Size: Adult Large)   Pulse 75   Temp 98.2  F (36.8  C) (Oral)   Ht 1.473 m (4' 10\")   Wt 82.1 kg (181 lb)   SpO2 97%   BMI 37.83 kg/m    Body mass index is 37.83 kg/m .  Physical Exam   GENERAL: healthy, alert and no distress  EYES: Eyes grossly normal to inspection, PERRL, EOMI, sclerae white and conjunctivae normal  RESP: lungs clear to auscultation - no crackles or wheezes, no areas of dullness, no tachypnea  CV: Heart regular rate and rhythm without murmur, click or rub. No peripheral edema and peripheral pulses strong  MS: no gross musculoskeletal defects noted, no edema  SKIN: no suspicious lesions or rashes to visible skin  NEURO: Normal strength and tone, sensory exam grossly normal, mentation intact, oriented times 3 and cranial nerves 2-12 intact  PSYCH: mentation appears normal, affect normal/bright       Diagnostic Test Results:     Ref. Range 9/21/2020 18:39   COVID-19 Virus by PCR (External Result) Latest Ref Range: Negative for SARS-CoV-2 RNA by PCR  Positive for SARS-CoV-2 RNA by PCR (AA)     Lab Results   Component Value Date    A1C 6.4 11/30/2020    A1C 7.4 03/09/2020    A1C 6.3 09/05/2019    A1C 6.8 12/17/2018    A1C 6.4 05/25/2016   Results for MARIETTA CAMPBELL (MRN [id number]) as of " 11/30/2020 13:26   Ref. Range 11/30/2020 12:39   WBC Latest Ref Range: 4.0 - 11.0 10e9/L 11.5 (H)   Hemoglobin Latest Ref Range: 11.7 - 15.7 g/dL 12.3   Hematocrit Latest Ref Range: 35.0 - 47.0 % 38.4   Platelet Count Latest Ref Range: 150 - 450 10e9/L 269   RBC Count Latest Ref Range: 3.8 - 5.2 10e12/L 4.06   MCV Latest Ref Range: 78 - 100 fl 95   MCH Latest Ref Range: 26.5 - 33.0 pg 30.3   MCHC Latest Ref Range: 31.5 - 36.5 g/dL 32.0   RDW Latest Ref Range: 10.0 - 15.0 % 14.3          ASSESSMENT / PLAN:    ASSESSMENT/PLAN:  (E11.29,  R80.9) Type 2 diabetes mellitus with microalbuminuria, without long-term current use of insulin (H)  (primary encounter diagnosis)  Comment: well controlled.  I do want her to be able to check her glucose at home when needed.  She she reports that she does not have a working monitor at home  Plan: Hemoglobin A1c, blood glucose monitoring (NO         BRAND SPECIFIED) meter device kit, blood         glucose (FREESTYLE TEST STRIPS) test strip,         blood glucose (NO BRAND SPECIFIED) lancets         standard, metFORMIN (GLUCOPHAGE-XR) 500 MG 24         hr tablet        Return in about 6 months (around 5/30/2021) for Medicare annual wellness exam, diabetes.      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  I agree that this does not sound like a COVID-19 infection  Plan: olopatadine (PATANOL) 0.1 %  ophthalmic solution          (Z12.11) Colon cancer screening  Comment:   Plan: Fecal colorectal cancer screen FIT          (Z12.31) Breast cancer screening by mammogram  Comment:   Plan: *MA Screening Digital Bilateral          (Z23) Need for vaccination  Comment: Influenza vaccine offered and accepted by patient. She has received it before without problems.   Plan: FLUZONE HIGH DOSE 65+  [47085]             Terry Sanchez MD

## 2020-12-01 ENCOUNTER — TELEPHONE (OUTPATIENT)
Dept: FAMILY MEDICINE | Facility: CLINIC | Age: 70
End: 2020-12-01

## 2020-12-01 DIAGNOSIS — H10.13 ALLERGIC CONJUNCTIVITIS, BILATERAL: Primary | ICD-10-CM

## 2020-12-01 NOTE — TELEPHONE ENCOUNTER
olopatadine (PATANOL) 0.1 % ophthalmic solution not covered.      [facility] requesting alternative.

## 2020-12-10 NOTE — TELEPHONE ENCOUNTER
Pharmacy is still looking for this.  Original request 12/01/2020    New request has PA info:    [phone], ID# [id number]

## 2020-12-15 NOTE — TELEPHONE ENCOUNTER
This writer attempted to contact Son on 12/15/20      Reason for call informed alternative Rx sent to pharmacy and left detailed message.      If patient calls back:   Relay message, (read verbatim), document that pt called and close encounter        Lulu Blake MA

## 2020-12-15 NOTE — TELEPHONE ENCOUNTER
Please let the patient (son) know that he replacement prescription was sent to her pharmacy.  If this 1 does not go through, it may be possible that no allergy eyedrop will go through because several are available OTC.

## 2020-12-30 PROCEDURE — 82274 ASSAY TEST FOR BLOOD FECAL: CPT | Performed by: FAMILY MEDICINE

## 2020-12-31 DIAGNOSIS — Z12.11 COLON CANCER SCREENING: ICD-10-CM

## 2021-01-01 LAB — HEMOCCULT STL QL IA: POSITIVE

## 2021-01-11 ENCOUNTER — OFFICE VISIT (OUTPATIENT)
Dept: FAMILY MEDICINE | Facility: CLINIC | Age: 71
End: 2021-01-11
Payer: MEDICARE

## 2021-01-11 VITALS
BODY MASS INDEX: 38.12 KG/M2 | TEMPERATURE: 98.1 F | DIASTOLIC BLOOD PRESSURE: 82 MMHG | SYSTOLIC BLOOD PRESSURE: 134 MMHG | OXYGEN SATURATION: 98 % | WEIGHT: 182.4 LBS | HEART RATE: 105 BPM | RESPIRATION RATE: 16 BRPM

## 2021-01-11 DIAGNOSIS — K14.8 LESION OF TONGUE: ICD-10-CM

## 2021-01-11 DIAGNOSIS — R19.5 HEME POSITIVE STOOL: ICD-10-CM

## 2021-01-11 DIAGNOSIS — K13.79 MOUTH SORES: Primary | ICD-10-CM

## 2021-01-11 LAB
KOH PREP SPEC: NORMAL
SPECIMEN SOURCE: NORMAL

## 2021-01-11 PROCEDURE — 99214 OFFICE O/P EST MOD 30 MIN: CPT | Performed by: FAMILY MEDICINE

## 2021-01-11 PROCEDURE — 87210 SMEAR WET MOUNT SALINE/INK: CPT | Performed by: FAMILY MEDICINE

## 2021-01-11 NOTE — PROGRESS NOTES
Assessment & Plan     Heme positive stool  She has a positive FIT that requires a colonoscopy  - GASTROENTEROLOGY ADULT REF PROCEDURE ONLY; Future    Mouth sores  I do not see any oral lesions that fit her description  - KOH prep (Other than skin, nails, hair)  - OTOLARYNGOLOGY REFERRAL    Lesion of tongue  I will check for thrush, but this does not look like thrush.  I believe she needs the tongue lesion biopsied.  - KOH prep (Other than skin, nails, hair)  - OTOLARYNGOLOGY REFERRAL      Return in about 20 weeks (around 5/31/2021) for diabetes.    Terry Sanchez MD  [facility] HomeCHARISMA Holland is a 70 year old who presents to clinic today for the following health issues  accompanied by her  (our telephone service):    HPI       Patient here today to discuss jawline facial swelling, started in December stated it has worsened and is following up with provider today. Patient would also like to discuss rash under tongue which comes and goes, started shortly after getting COVID-19 infection.    She describes how she gets blisters under the tongue.  This is been going on since her COVID-19 infection in September.  Occasionally they are on the gums.  Other symptoms include ear pain, tenderness where she has preauricular lymph nodes, and she says this all makes her face feel puffy      Review of Systems   Constitutional, HEENT, cardiovascular, pulmonary, gi and gu systems are negative, except as otherwise noted.      Objective    /82   Pulse 105   Temp 98.1  F (36.7  C) (Oral)   Resp 16   Wt 82.7 kg (182 lb 6.4 oz)   SpO2 98%   Breastfeeding No   BMI 38.12 kg/m    Body mass index is 38.12 kg/m .  Physical Exam   GENERAL: healthy, alert and no distress  HENT: ear canals and TM's normal, nose without ulcers or lesions.  I do not see any blistering or aphthous ulcers on the gums or other oral mucosa.  However, she does have a white hyperkeratotic lesion on the  tongue, lateral edge on the right side.  It is not tender and not what she has been describing   NECK: no adenopathy, no asymmetry, masses, or scars and thyroid normal to palpation  RESP: lungs clear to auscultation - no rales, rhonchi or wheezes  CV: regular rate and rhythm, normal S1 S2, no S3 or S4, no murmur, click or rub, no peripheral edema and peripheral pulses strong  ABDOMEN: soft, nontender, no hepatosplenomegaly, no masses and bowel sounds normal  MS: no gross musculoskeletal defects noted, no edema    Orders Only on 12/31/2020   Component Date Value Ref Range Status     Occult Blood Scn FIT 12/30/2020 Positive* NEG^Negative Final     No results found for any visits on 01/11/21.

## 2021-01-15 ENCOUNTER — ANCILLARY PROCEDURE (OUTPATIENT)
Dept: MAMMOGRAPHY | Facility: CLINIC | Age: 71
End: 2021-01-15
Attending: FAMILY MEDICINE
Payer: MEDICARE

## 2021-01-15 DIAGNOSIS — Z12.31 BREAST CANCER SCREENING BY MAMMOGRAM: ICD-10-CM

## 2021-01-15 PROCEDURE — 77067 SCR MAMMO BI INCL CAD: CPT | Mod: TC | Performed by: RADIOLOGY

## 2021-01-25 DIAGNOSIS — Z11.59 ENCOUNTER FOR SCREENING FOR OTHER VIRAL DISEASES: ICD-10-CM

## 2021-01-29 ENCOUNTER — OFFICE VISIT (OUTPATIENT)
Dept: OTOLARYNGOLOGY | Facility: CLINIC | Age: 71
End: 2021-01-29
Attending: FAMILY MEDICINE
Payer: MEDICARE

## 2021-01-29 VITALS — SYSTOLIC BLOOD PRESSURE: 105 MMHG | OXYGEN SATURATION: 98 % | DIASTOLIC BLOOD PRESSURE: 75 MMHG | HEART RATE: 89 BPM

## 2021-01-29 DIAGNOSIS — M26.609 TMJ (TEMPOROMANDIBULAR JOINT SYNDROME): Primary | ICD-10-CM

## 2021-01-29 DIAGNOSIS — K13.70 ORAL MUCOSAL LESION: ICD-10-CM

## 2021-01-29 DIAGNOSIS — G47.33 OSA (OBSTRUCTIVE SLEEP APNEA): ICD-10-CM

## 2021-01-29 LAB
BASOPHILS # BLD AUTO: 0.1 10E9/L (ref 0–0.2)
BASOPHILS NFR BLD AUTO: 0.8 %
CRP SERPL-MCNC: 5.3 MG/L (ref 0–8)
DIFFERENTIAL METHOD BLD: NORMAL
EOSINOPHIL # BLD AUTO: 0.2 10E9/L (ref 0–0.7)
EOSINOPHIL NFR BLD AUTO: 1.9 %
ERYTHROCYTE [DISTWIDTH] IN BLOOD BY AUTOMATED COUNT: 12.8 % (ref 10–15)
ERYTHROCYTE [SEDIMENTATION RATE] IN BLOOD BY WESTERGREN METHOD: 27 MM/H (ref 0–30)
HCT VFR BLD AUTO: 43.3 % (ref 35–47)
HGB BLD-MCNC: 13.9 G/DL (ref 11.7–15.7)
IMM GRANULOCYTES # BLD: 0.1 10E9/L (ref 0–0.4)
IMM GRANULOCYTES NFR BLD: 0.7 %
LYMPHOCYTES # BLD AUTO: 1.5 10E9/L (ref 0.8–5.3)
LYMPHOCYTES NFR BLD AUTO: 14.7 %
MCH RBC QN AUTO: 29.7 PG (ref 26.5–33)
MCHC RBC AUTO-ENTMCNC: 32.1 G/DL (ref 31.5–36.5)
MCV RBC AUTO: 93 FL (ref 78–100)
MONOCYTES # BLD AUTO: 1 10E9/L (ref 0–1.3)
MONOCYTES NFR BLD AUTO: 9.6 %
NEUTROPHILS # BLD AUTO: 7.2 10E9/L (ref 1.6–8.3)
NEUTROPHILS NFR BLD AUTO: 72.3 %
PLATELET # BLD AUTO: 338 10E9/L (ref 150–450)
RBC # BLD AUTO: 4.68 10E12/L (ref 3.8–5.2)
WBC # BLD AUTO: 10 10E9/L (ref 4–11)

## 2021-01-29 PROCEDURE — 85025 COMPLETE CBC W/AUTO DIFF WBC: CPT | Performed by: OTOLARYNGOLOGY

## 2021-01-29 PROCEDURE — 36415 COLL VENOUS BLD VENIPUNCTURE: CPT | Performed by: OTOLARYNGOLOGY

## 2021-01-29 PROCEDURE — 86038 ANTINUCLEAR ANTIBODIES: CPT | Performed by: INTERNAL MEDICINE

## 2021-01-29 PROCEDURE — 99204 OFFICE O/P NEW MOD 45 MIN: CPT | Performed by: OTOLARYNGOLOGY

## 2021-01-29 PROCEDURE — 86140 C-REACTIVE PROTEIN: CPT | Performed by: INTERNAL MEDICINE

## 2021-01-29 PROCEDURE — 85652 RBC SED RATE AUTOMATED: CPT | Performed by: OTOLARYNGOLOGY

## 2021-01-29 RX ORDER — CYCLOBENZAPRINE HCL 5 MG
5 TABLET ORAL 2 TIMES DAILY PRN
Qty: 40 TABLET | Refills: 0 | Status: SHIPPED | OUTPATIENT
Start: 2021-01-29 | End: 2023-10-30

## 2021-01-29 ASSESSMENT — ENCOUNTER SYMPTOMS
BRUISES/BLEEDS EASILY: 0
SPUTUM PRODUCTION: 0
DIZZINESS: 0
STRIDOR: 0
NAUSEA: 0
HEMOPTYSIS: 0
SINUS PAIN: 0
VOMITING: 0
HEADACHES: 0
SORE THROAT: 1
COUGH: 0
BLURRED VISION: 0
DOUBLE VISION: 0
HEARTBURN: 0
PHOTOPHOBIA: 0
TINGLING: 0
TREMORS: 0
CONSTITUTIONAL NEGATIVE: 1

## 2021-01-29 NOTE — LETTER
1/29/2021         RE: Amalia Harris  [address]  [place] [address]        Dear Colleague,    Thank you for referring your patient, Amalia Harris, to the [facility]. Please see a copy of my visit note below.    HPI     This pleasant patient is having mouth and tongue soreness since September of this year after a COVID infection. States burning sensation, and tightness in her neck. Describes Clenching and TMJ issues. Denies any voice changes, difficulty swallowing, or heartburn. She snores but is not sure about apnea. She has a hx of diabetes, osteoarthritis, and CHF. She wears a denture.      Current Medications:  famotidine (PEPCID) 20 MG tablet  losartan (COZAAR) 25 MG tablet  lovastatin (MEVACOR) 40 MG tablet  metFORMIN (GLUCOPHAGE-XR) 500 MG 24 hr tablet  olopatadine (PATANOL) 0.1 % ophthalmic solution  potassium citrate (UROCIT-K) 10 MEQ (1080 MG) CR tablet  tetrahydrozoline (VISINE) 0.05 % ophthalmic solution  blood glucose (FREESTYLE TEST STRIPS) test strip  blood glucose (NO BRAND SPECIFIED) lancets standard  ketotifen (ZADITOR) 0.025 % ophthalmic solution      Current Medical history:  Hyperlipidemia LDL goal <100  Major depression  Osteoarthritis  Intertriginous candidiasis  Severe obesity (BMI 35.0-39.9) with comorbidity (H)  Type 2 diabetes mellitus with microalbuminuria, without long-term current use of insulin (H)  Essential hypertension with goal blood pressure less than 140/90  CHF (congestive heart failure) (H)  Hypocitraturia  Low urine output  Uric acid nephrolithiasis  Urinary tract stones  Benign hypertensive heart disease with congestive heart failure (H)  Calculus of ureter  Dyslipidemia  Hydronephrosis with urinary obstruction due to ureteral calculus    Review of Systems   Constitutional: Negative.    HENT: Positive for congestion and sore throat. Negative for ear discharge, ear pain, hearing loss, sinus pain and tinnitus.    Eyes: Negative for blurred vision,  double vision and photophobia.   Respiratory: Negative for cough, hemoptysis, sputum production and stridor.    Gastrointestinal: Negative for heartburn, nausea and vomiting.   Skin: Negative.    Neurological: Negative for dizziness, tingling, tremors and headaches.   Endo/Heme/Allergies: Negative for environmental allergies. Does not bruise/bleed easily.         Physical Exam  Vitals signs reviewed.   Constitutional:       Appearance: Normal appearance.   HENT:      Head: Normocephalic and atraumatic.      Right Ear: Hearing, tympanic membrane, ear canal and external ear normal. No decreased hearing noted. No middle ear effusion. There is no impacted cerumen.      Left Ear: Hearing, tympanic membrane, ear canal and external ear normal. No decreased hearing noted.  No middle ear effusion. There is no impacted cerumen.      Nose: Congestion present. No rhinorrhea.      Right Turbinates: Not enlarged or swollen.      Left Turbinates: Not enlarged or swollen.      Mouth/Throat:      Mouth: Mucous membranes are moist.      Pharynx: Oropharynx is clear. Uvula midline.   Eyes:      Extraocular Movements: Extraocular movements intact.      Pupils: Pupils are equal, round, and reactive to light.   Neurological:      Mental Status: She is alert.     TMJ is sensitive to palpation.  Tongue position: grade III.  RIght lateral tongue: mucosal erosion; hyperemia.    A/P  This pleasant patient is having a right lateral tongue lesion for months. TMJ is sensitive to palpation. Tongue position: grade III.  RIght lateral tongue: mucosal erosion; hyperemia with no swelling.   A sleep study will be requested.CBC, ESR, CRP. And TATIANA will be done. She needs to see her dentist for her denture and sharp teeth as well as TMJ dysfunction. She will continue with Vitamin B complex, Mg, zinc. Her diabetes : Glucose regulation is under control. I will give her a muscle relaxant medication, Cyclobenzaprine 5 mg x2 as needed for her TMJ dysfunction  and neck tightness and see her in the f/u.          Again, thank you for allowing me to participate in the care of your patient.        Sincerely,        Yves Benavides MD

## 2021-01-29 NOTE — PROGRESS NOTES
HPI     This pleasant patient is having mouth and tongue soreness since September of this year after a COVID infection. States burning sensation, and tightness in her neck. Describes Clenching and TMJ issues. Denies any voice changes, difficulty swallowing, or heartburn. She snores but is not sure about apnea. She has a hx of diabetes, osteoarthritis, and CHF. She wears a denture.      Current Medications:  famotidine (PEPCID) 20 MG tablet  losartan (COZAAR) 25 MG tablet  lovastatin (MEVACOR) 40 MG tablet  metFORMIN (GLUCOPHAGE-XR) 500 MG 24 hr tablet  olopatadine (PATANOL) 0.1 % ophthalmic solution  potassium citrate (UROCIT-K) 10 MEQ (1080 MG) CR tablet  tetrahydrozoline (VISINE) 0.05 % ophthalmic solution  blood glucose (FREESTYLE TEST STRIPS) test strip  blood glucose (NO BRAND SPECIFIED) lancets standard  ketotifen (ZADITOR) 0.025 % ophthalmic solution      Current Medical history:  Hyperlipidemia LDL goal <100  Major depression  Osteoarthritis  Intertriginous candidiasis  Severe obesity (BMI 35.0-39.9) with comorbidity (H)  Type 2 diabetes mellitus with microalbuminuria, without long-term current use of insulin (H)  Essential hypertension with goal blood pressure less than 140/90  CHF (congestive heart failure) (H)  Hypocitraturia  Low urine output  Uric acid nephrolithiasis  Urinary tract stones  Benign hypertensive heart disease with congestive heart failure (H)  Calculus of ureter  Dyslipidemia  Hydronephrosis with urinary obstruction due to ureteral calculus    Review of Systems   Constitutional: Negative.    HENT: Positive for congestion and sore throat. Negative for ear discharge, ear pain, hearing loss, sinus pain and tinnitus.    Eyes: Negative for blurred vision, double vision and photophobia.   Respiratory: Negative for cough, hemoptysis, sputum production and stridor.    Gastrointestinal: Negative for heartburn, nausea and vomiting.   Skin: Negative.    Neurological: Negative for dizziness,  tingling, tremors and headaches.   Endo/Heme/Allergies: Negative for environmental allergies. Does not bruise/bleed easily.         Physical Exam  Vitals signs reviewed.   Constitutional:       Appearance: Normal appearance.   HENT:      Head: Normocephalic and atraumatic.      Right Ear: Hearing, tympanic membrane, ear canal and external ear normal. No decreased hearing noted. No middle ear effusion. There is no impacted cerumen.      Left Ear: Hearing, tympanic membrane, ear canal and external ear normal. No decreased hearing noted.  No middle ear effusion. There is no impacted cerumen.      Nose: Congestion present. No rhinorrhea.      Right Turbinates: Not enlarged or swollen.      Left Turbinates: Not enlarged or swollen.      Mouth/Throat:      Mouth: Mucous membranes are moist.      Pharynx: Oropharynx is clear. Uvula midline.   Eyes:      Extraocular Movements: Extraocular movements intact.      Pupils: Pupils are equal, round, and reactive to light.   Neurological:      Mental Status: She is alert.     TMJ is sensitive to palpation.  Tongue position: grade III.  RIght lateral tongue: mucosal erosion; hyperemia.    A/P  This pleasant patient is having a right lateral tongue lesion for months. TMJ is sensitive to palpation. Tongue position: grade III.  RIght lateral tongue: mucosal erosion; hyperemia with no swelling.   A sleep study will be requested.CBC, ESR, CRP. And TATIANA will be done. She needs to see her dentist for her denture and sharp teeth as well as TMJ dysfunction. She will continue with Vitamin B complex, Mg, zinc. Her diabetes : Glucose regulation is under control. I will give her a muscle relaxant medication, Cyclobenzaprine 5 mg x2 as needed for her TMJ dysfunction and neck tightness and see her in the f/u.

## 2021-01-29 NOTE — NURSING NOTE
Amalia Harris's goals for this visit include:   Chief Complaint   Patient presents with     Mouth sores     Sores on the tounge, they come and go, started after having Covid in Sept. Painful when they appear.        She requests these members of her care team be copied on today's visit information: yes    PCP: Terry Sanchez    Referring Provider:  Terry Sanchez MD  [address]  [place]  [place] [address]    /75   Pulse 89   SpO2 98%     Do you need any medication refills at today's visit? none

## 2021-02-01 ENCOUNTER — TELEPHONE (OUTPATIENT)
Dept: OTOLARYNGOLOGY | Facility: CLINIC | Age: 71
End: 2021-02-01

## 2021-02-01 LAB — ANA SER QL IF: NEGATIVE

## 2021-02-01 NOTE — TELEPHONE ENCOUNTER
----- Message from Yves Benavides MD sent at 1/29/2021  4:56 PM CST -----  Her CBC, ESR and CRP all within normal limits. She will be seen as scheduled.

## 2021-02-01 NOTE — TELEPHONE ENCOUNTER
Phone call to pts john Kowalski. Left message that labwork within normal limits. Asked pt to call back with questions or concerns. Idania Armijo RN

## 2021-02-03 RX ORDER — SODIUM PICOSULFATE, MAGNESIUM OXIDE, AND ANHYDROUS CITRIC ACID 10; 3.5; 12 MG/160ML; G/160ML; G/160ML
2 LIQUID ORAL SEE ADMIN INSTRUCTIONS
Qty: 320 ML | Refills: 0 | Status: SHIPPED | OUTPATIENT
Start: 2021-02-03 | End: 2021-08-02

## 2021-02-07 DIAGNOSIS — Z11.59 ENCOUNTER FOR SCREENING FOR OTHER VIRAL DISEASES: ICD-10-CM

## 2021-02-07 LAB
SARS-COV-2 RNA RESP QL NAA+PROBE: NORMAL
SPECIMEN SOURCE: NORMAL

## 2021-02-07 PROCEDURE — U0005 INFEC AGEN DETEC AMPLI PROBE: HCPCS | Performed by: SPECIALIST

## 2021-02-07 PROCEDURE — U0003 INFECTIOUS AGENT DETECTION BY NUCLEIC ACID (DNA OR RNA); SEVERE ACUTE RESPIRATORY SYNDROME CORONAVIRUS 2 (SARS-COV-2) (CORONAVIRUS DISEASE [COVID-19]), AMPLIFIED PROBE TECHNIQUE, MAKING USE OF HIGH THROUGHPUT TECHNOLOGIES AS DESCRIBED BY CMS-2020-01-R: HCPCS | Performed by: SPECIALIST

## 2021-02-08 LAB
LABORATORY COMMENT REPORT: NORMAL
SARS-COV-2 RNA RESP QL NAA+PROBE: NEGATIVE
SPECIMEN SOURCE: NORMAL

## 2021-02-10 ENCOUNTER — HOSPITAL ENCOUNTER (OUTPATIENT)
Facility: AMBULATORY SURGERY CENTER | Age: 71
Discharge: HOME | End: 2021-02-10
Attending: SPECIALIST | Admitting: SPECIALIST
Payer: MEDICARE

## 2021-02-10 VITALS
DIASTOLIC BLOOD PRESSURE: 79 MMHG | HEART RATE: 90 BPM | OXYGEN SATURATION: 98 % | TEMPERATURE: 96.6 F | RESPIRATION RATE: 16 BRPM | SYSTOLIC BLOOD PRESSURE: 118 MMHG

## 2021-02-10 DIAGNOSIS — Z12.11 SCREEN FOR COLON CANCER: Primary | ICD-10-CM

## 2021-02-10 LAB
COLONOSCOPY: NORMAL
GLUCOSE BLDC GLUCOMTR-MCNC: 126 MG/DL (ref 70–99)

## 2021-02-10 PROCEDURE — 45385 COLONOSCOPY W/LESION REMOVAL: CPT

## 2021-02-10 PROCEDURE — G8907 PT DOC NO EVENTS ON DISCHARG: HCPCS

## 2021-02-10 PROCEDURE — 88305 TISSUE EXAM BY PATHOLOGIST: CPT | Performed by: PATHOLOGY

## 2021-02-10 PROCEDURE — G8918 PT W/O PREOP ORDER IV AB PRO: HCPCS

## 2021-02-10 RX ORDER — NALOXONE HYDROCHLORIDE 0.4 MG/ML
0.4 INJECTION, SOLUTION INTRAMUSCULAR; INTRAVENOUS; SUBCUTANEOUS
Status: CANCELLED | OUTPATIENT
Start: 2021-02-10 | End: 2021-02-11

## 2021-02-10 RX ORDER — ONDANSETRON 2 MG/ML
4 INJECTION INTRAMUSCULAR; INTRAVENOUS EVERY 6 HOURS PRN
Status: CANCELLED | OUTPATIENT
Start: 2021-02-10

## 2021-02-10 RX ORDER — LIDOCAINE 40 MG/G
CREAM TOPICAL
Status: DISCONTINUED | OUTPATIENT
Start: 2021-02-10 | End: 2021-02-11 | Stop reason: HOSPADM

## 2021-02-10 RX ORDER — ONDANSETRON 4 MG/1
4 TABLET, ORALLY DISINTEGRATING ORAL EVERY 6 HOURS PRN
Status: CANCELLED | OUTPATIENT
Start: 2021-02-10

## 2021-02-10 RX ORDER — ONDANSETRON 2 MG/ML
4 INJECTION INTRAMUSCULAR; INTRAVENOUS
Status: DISCONTINUED | OUTPATIENT
Start: 2021-02-10 | End: 2021-02-11 | Stop reason: HOSPADM

## 2021-02-10 RX ORDER — NALOXONE HYDROCHLORIDE 0.4 MG/ML
0.2 INJECTION, SOLUTION INTRAMUSCULAR; INTRAVENOUS; SUBCUTANEOUS
Status: CANCELLED | OUTPATIENT
Start: 2021-02-10 | End: 2021-02-11

## 2021-02-10 RX ORDER — FENTANYL CITRATE 50 UG/ML
INJECTION, SOLUTION INTRAMUSCULAR; INTRAVENOUS PRN
Status: DISCONTINUED | OUTPATIENT
Start: 2021-02-10 | End: 2021-02-10 | Stop reason: HOSPADM

## 2021-02-10 RX ORDER — FLUMAZENIL 0.1 MG/ML
0.2 INJECTION, SOLUTION INTRAVENOUS
Status: CANCELLED | OUTPATIENT
Start: 2021-02-10 | End: 2021-02-10

## 2021-02-10 RX ORDER — PROCHLORPERAZINE MALEATE 5 MG
5 TABLET ORAL EVERY 6 HOURS PRN
Status: CANCELLED | OUTPATIENT
Start: 2021-02-10

## 2021-02-10 NOTE — H&P
Pre-Endoscopy History and Physical     Amalia Harris MRN# [id number]   YOB: 1950 Age: 70 year old     Date of Procedure: 2/10/2021  Primary care provider: Terry Sanchez  Type of Endoscopy: Colonoscopy with possible biopsy, possible polypectomy  Reason for Procedure: FIT test positive  Type of Anesthesia Anticipated: Conscious Sedation    HPI:    Amalia is a 70 year old female who will be undergoing the above procedure.      A history and physical has been performed. The patient's medications and allergies have been reviewed. The risks and benefits of the procedure and the sedation options and risks were discussed with the patient.  All questions were answered and informed consent was obtained.      She denies a personal or family history of anesthesia complications or bleeding disorders.     Patient Active Problem List   Diagnosis     Hyperlipidemia LDL goal <100     Major depression     Osteoarthritis     Advance care planning     Intertriginous candidiasis     Severe obesity (BMI 35.0-39.9) with comorbidity (H)     Type 2 diabetes mellitus with microalbuminuria, without long-term current use of insulin (H)     Essential hypertension with goal blood pressure less than 140/90     CHF (congestive heart failure) (H)     Hypocitraturia     Low urine output     Uric acid nephrolithiasis     Urinary tract stones     Benign hypertensive heart disease with congestive heart failure (H)     Calculus of ureter     Dyslipidemia     Hydronephrosis with urinary obstruction due to ureteral calculus        Past Medical History:   Diagnosis Date     Guaiac positive stools 6/6/2016     Hyperlipidemia LDL goal <130 3/23/2011     Hypertension      Major depression 3/23/2011     Nonsenile cataract      Osteoarthritis 3/23/2011        Past Surgical History:   Procedure Laterality Date     CATARACT IOL, RT/LT       GALLBLADDER SURGERY  1986     PHACOEMULSIFICATION WITH STANDARD INTRAOCULAR LENS IMPLANT Right 11/11/2019     Procedure: RIGHT PHACOEMULSIFICATION, CATARACT, WITH STANDARD IOL INSERTION;  Surgeon: Robel Clancy MD;  Location: [facility] OR     PHACOEMULSIFICATION WITH STANDARD INTRAOCULAR LENS IMPLANT Left 11/25/2019    Procedure: LEFT PHACOEMULSIFICATION, CATARACT, WITH STANDARD IOL INSERTION;  Surgeon: Robel Clancy MD;  Location: [facility] OR       Social History     Tobacco Use     Smoking status: Never Smoker     Smokeless tobacco: Never Used   Substance Use Topics     Alcohol use: No     Alcohol/week: 0.0 standard drinks       Family History   Problem Relation Age of Onset     Respiratory Father      Hyperlipidemia Father      Hyperlipidemia Sister      Cancer Brother         throat or esophageal (alcohol)     Hypertension No family hx of      Cerebrovascular Disease No family hx of      Coronary Artery Disease No family hx of      Diabetes No family hx of      Anesthesia Reaction No family hx of      Bleeding Disorder No family hx of      Thrombophilia No family hx of        Prior to Admission medications    Medication Sig Start Date End Date Taking? Authorizing Provider   polyethylene glycol (GOLYTELY) 236 g suspension Take 4,000 mLs by mouth See Admin Instructions The following are available over the counter:  1. One ten-ounce bottle of Citrate of Magnesium; Take at 7 pm, two days before you procedure.  2.  1 oz simethicone solution (40 mg/0.6 ml)  3.  Prescription 1 4-liter container.  In the evening before your procedure, fill the container with water to 4-liter fill line (if you purchased NuLytely, first add the contents of the flavor powder).  After capping the container, shake vigorously several times.  Do not refrigerate.  Sig: Follow split dose (2-Day) regimen:  At 6 pm before before your procedure:  Drink 8 oz. (240 ml) every 10 minutes, until 2 liters are consumed.  In the AM of you procedure: Add 1 teaspoon of simethicone (40 mg/0.6 ml) to the remaining 2 L prep.  Starting five (5) hours before your  procedure, drink 8 oz. (240 ml) every 10 minutes until the remaining 2 liters are consumed.  Complete this step at least 2 hours before your arrival time. 2/3/21  Yes Loco Andersen MD   Sod Picosulfate-Mag Ox-Cit Acd (CLENPIQ) 10-3.5-12 MG-GM -GM/160ML SOLN Take 2 Bottles by mouth See Admin Instructions Follow Spit Dose (2-Day) Regimen: Day1: The evening before your procedure: Drink on 5.4 oz bottle. Then drink at least 5- 8 oz cups of water within 5 hours.  Day 2: The morning of your procedure:  Starting 5 hours before your colonoscopy, drink the contents of the other 5.4 oz bottle.  Then drink at least 3 (three)- 8 oz cups of water, add 1 teaspoon of simethicone (40 mg/0.6 ml) to the second glass of water.  Complete this at least 2 hours before you arrival time. 2/3/21  Yes Loco Andersen MD   blood glucose (FREESTYLE TEST STRIPS) test strip 1 strip by In Vitro route daily 11/30/20   Terry Sanchez MD   blood glucose (NO BRAND SPECIFIED) lancets standard Use to test blood sugar 1 times daily or as directed. 11/30/20   Terry Sanchez MD   blood glucose monitoring (NO BRAND SPECIFIED) meter device kit Use to test blood sugar 1 times daily or as directed. 11/30/20   Terry Sanchez MD   cyclobenzaprine (FLEXERIL) 5 MG tablet Take 1 tablet (5 mg) by mouth 2 times daily as needed for muscle spasms 1/29/21   Yves Benavides MD   famotidine (PEPCID) 20 MG tablet Take 1 tablet (20 mg) by mouth 2 times daily for reflux. 9/29/20   Terry Sanchez MD   ketotifen (ZADITOR) 0.025 % ophthalmic solution Place 1 drop into both eyes 2 times daily as needed for itching (for allergic eye symptoms)  Patient not taking: Reported on 1/22/2021 12/15/20   Terry Sanchez MD   losartan (COZAAR) 25 MG tablet Take 1 tablet (25 mg) by mouth daily for blood pressure & heart. 11/30/20   Terry Sanchez MD   lovastatin (MEVACOR) 40 MG tablet Take 1 tablet (40 mg) by mouth every evening for cholesterol. 10/22/20    "Terry Sanchez MD   metFORMIN (GLUCOPHAGE-XR) 500 MG 24 hr tablet Take 4 tablets (2,000 mg) by mouth daily (with dinner) for diabetes. 11/30/20   Terry Sanchez MD   olopatadine (PATANOL) 0.1 % ophthalmic solution Place 1 drop into both eyes 2 times daily As needed for allergic eye symptoms 11/30/20   Terry Sanchez MD   potassium citrate (UROCIT-K) 10 MEQ (1080 MG) CR tablet Take 3 tablets (30 mEq) by mouth 2 times daily for kidney stone prevention. 11/30/20   Terry Sanchez MD   tetrahydrozoline (VISINE) 0.05 % ophthalmic solution Place 1 drop into both eyes as needed    Reported, Patient       Allergies   Allergen Reactions     No Clinical Screening - See Comments Rash     Tele patches        REVIEW OF SYSTEMS:   5 point ROS negative except as noted above in HPI, including Gen., Resp., CV, GI &  system review.    PHYSICAL EXAM:   /78   Temp 96.6  F (35.9  C) (Temporal)   SpO2 97%  Estimated body mass index is 38.12 kg/m  as calculated from the following:    Height as of 11/30/20: 1.473 m (4' 10\").    Weight as of 1/11/21: 82.7 kg (182 lb 6.4 oz).   GENERAL APPEARANCE: alert, and oriented  MENTAL STATUS: alert  AIRWAY EXAM: Mallampatti Class II (visualization of the soft palate, fauces, and uvula)  RESP: lungs clear to auscultation - no rales, rhonchi or wheezes  CV: regular rates and rhythm, 2/6 systolic murmur LLSB  DIAGNOSTICS:    Not indicated    IMPRESSION   ASA Class 2 - Mild systemic disease    PLAN:   Plan for Colonoscopy with possible biopsy, possible polypectomy. We discussed the risks, benefits and alternatives and the patient wished to proceed.    The above has been forwarded to the consulting provider.      Signed Electronically by: Loco Andersen MD  February 10, 2021          "

## 2021-02-12 LAB — COPATH REPORT: NORMAL

## 2021-02-14 NOTE — RESULT ENCOUNTER NOTE
Assessment: Adenomatous polyps are benign, but have malignant potential. This increases the risk of colon cancer and impacts the frequency of colonoscopy. Based on the updated polypectomy surveillance recommendations, in individuals with 3-4 adenomas <10 mm, surveillance should occur in 3-5 years. Since the prep was excellent and the polyps were small, I think a five year interval is reasonable.    Recommendations: Surveillance colonoscopy in 5 years (2026).  First degree relatives of patients with adenomatous polyps are at increased risk of developing adenomas and colorectal cancer; screening colonoscopy is recommended.

## 2021-03-12 ENCOUNTER — IMMUNIZATION (OUTPATIENT)
Dept: NURSING | Facility: CLINIC | Age: 71
End: 2021-03-12
Payer: MEDICARE

## 2021-03-12 PROCEDURE — 0001A PR COVID VAC PFIZER DIL RECON 30 MCG/0.3 ML IM: CPT

## 2021-03-12 PROCEDURE — 91300 PR COVID VAC PFIZER DIL RECON 30 MCG/0.3 ML IM: CPT

## 2021-03-24 ENCOUNTER — TELEPHONE (OUTPATIENT)
Dept: FAMILY MEDICINE | Facility: CLINIC | Age: 71
End: 2021-03-24

## 2021-04-02 ENCOUNTER — TELEPHONE (OUTPATIENT)
Dept: FAMILY MEDICINE | Facility: CLINIC | Age: 71
End: 2021-04-02

## 2021-04-02 ENCOUNTER — IMMUNIZATION (OUTPATIENT)
Dept: NURSING | Facility: CLINIC | Age: 71
End: 2021-04-02
Attending: PHYSICIAN ASSISTANT
Payer: MEDICARE

## 2021-04-02 PROCEDURE — 91300 PR COVID VAC PFIZER DIL RECON 30 MCG/0.3 ML IM: CPT

## 2021-04-02 PROCEDURE — 0002A PR COVID VAC PFIZER DIL RECON 30 MCG/0.3 ML IM: CPT

## 2021-05-11 DIAGNOSIS — R80.9 TYPE 2 DIABETES MELLITUS WITH MICROALBUMINURIA, WITHOUT LONG-TERM CURRENT USE OF INSULIN (H): ICD-10-CM

## 2021-05-11 DIAGNOSIS — E11.29 TYPE 2 DIABETES MELLITUS WITH MICROALBUMINURIA, WITHOUT LONG-TERM CURRENT USE OF INSULIN (H): ICD-10-CM

## 2021-05-11 RX ORDER — METFORMIN HCL 500 MG
TABLET, EXTENDED RELEASE 24 HR ORAL
Qty: 360 TABLET | Refills: 0 | Status: SHIPPED | OUTPATIENT
Start: 2021-05-11 | End: 2021-08-02

## 2021-05-11 NOTE — TELEPHONE ENCOUNTER
Please send letter.  Patient due now for her diabetic labwork and appointment for diabetes.  1 refill given on her metformin.  Needs to be seen.  Cata MCNEILL RN

## 2021-05-29 NOTE — TELEPHONE ENCOUNTER
Patient called stating she has been having fevers and chills on and off since Monday.  She is not sure if it is from taking the potassium citrate.  She has a lab appointment today for a urine PH testing.  She is also doing a trial of passage for her ureteral stone.  She does not have a thermometer at home.  Ok per Monse Hwang PA-C, will add in UA and UC for lab specimen.      After leaving a urine specimen to the outpatient lab, patient came up to [facility] [place].  Temperature was obtained for 98.7.  Patient was also given a urinary hat and a strainer funnel.  Will call patient with urine results and go from there.  Patient understand and agree with recommendations.

## 2021-05-29 NOTE — PATIENT INSTRUCTIONS - HE
Patient Stated Goal: Prevent further stones  INCREASING FLUIDS TO DECREASE RISK    Low Urinary Volume:     Kidney stones form because there is not enough water to dissolve the concentrated chemicals and minerals in urine.     Studies have found that people who make kidney stones should drink enough fluids so that they produce at least 2 liters (2 quarts) of urine per day.     Studies have shown that virtually every fluid you might drink decreases the risk of stone formation. Acceptable fluids include milk, coffee, diet and regular sodas, alcoholic beverages, fruit juices and even plain old water.    Increasing fluid intake will increase urine volume and decrease the chance of kidney stone formation.    Fluids:    Anything liquid that fits in a glass counts.     One way to gauge if your body has enough fluids is to check the color of your urine. If the urine is dark and yellow you do not have enough fluids. Urine will be pale yellow to clear if your body has enough fluids.    What we need to survive:    Most fluid we drink is lost through evaporation, more if active in hot humid environments    Body wastes can be cleared in a small amount of urine    All fluid that is consumed in excess of necessary losses  dilutes the urine    Ways to Increase Fluids Daily:    Drink more fluids throughout the day and into the evening. (You may need to awake from sleep to urinate which is a good indication of volume status)    Keep your refrigerator stocked with beverages you like    Drink a variety of fluids - mix it up    Add another beverage to your regular beverage at every meal    Keep a beverage at your desk (work area) and finish it before you leave for breaks, meetings, lunch and end of day    Use larger volume glasses    Whenever you pass a water fountain - stop and drink    Drink a beverage with snacks, if it is a salty snack, double the beverage    Take medication with a full glass of water/fluid    Keep a bottle of  water in your car and drink every 20 miles    Eat high water content fruits (melons, oranges, grapes, etc.)    Flavor water with a squeeze of lemon or lime or Crystal Light     If you do something repetitive throughout the day, link it with having something to drink    When you give your child/children something to drink, you have something to drink also    [facility] can respond to your questions or concerns 24 hours a day at [phone].      Potassium Citrate    This medication can be used in two ways to help stone formers.      Citrate in the urine makes it harder for stones to form and grow. Many stone patients do not have enough citrate in their urine and require a supplement.        Citrate is an effective way to make the urine less acidic. Uric acid stones only form when the urine is acidic and may dissolve if urine acidity can be reversed.      USES: This medication is used for low urinary citrate (hypocitraturia), renal tubular acidosis, uric acid stones, calcium oxalate stones, and cysteine stones.    HOW TO USE:     Potassium citrate comes in several different forms      Tablet - made of potassium citrate in a wax substance to allow delayed absorption. It is common to see a  ghost tablet  in the stool after all the potassium citrate has been absorbed. This is more common in people with diarrhea or other bowel disorders.      Powder - this can be dissolved in water or other clear fluids. To decrease stomach irritation and improve absorption, we recommend dissolving in at least 500 cc of fluid and drinking slowly. You do not have to drink this medication all at once. Many patients find the taste is improved by adding a sweetener.      Liquid - Similar to the powder, the liquid formulation is best dissolved in a large container of fluid and consumed slowly.     SIDE EFFECTS:  The primary side effect of this medication is stomach irritation, less so with the tablet. Some people find stomach  irritation decreased by taking the medication with food and avoiding lying down for at least a half hour after consumption.    If you develop diarrhea, nausea, vomiting, stomach pain, fluid retention, convulsions, unusual weakness, mental confusion, tingling or numbness of the hands or feet or other potential side effects, notify your doctor or pharmacist promptly.                    *This is a summary and does not contain all possible information about this product. For complete information about this product or your specific health needs, ask your healthcare professional. Always seek the advice of your healthcare professional if you have any questions about this product or your medical condition.

## 2021-05-29 NOTE — PROGRESS NOTES
Per Monse Hwang PA-C, patient to increase potassium citrate by two more tablets.  Patient may take 4 tablet in the a.m. And 4 tablets in the evening or 3 tablets in the a.m., 2 tablets at lunch/midday and 3 tablets in the evening.  Patient understands and agree with recommendation.  Check urine ph level in 1 week.

## 2021-05-29 NOTE — PROGRESS NOTES
Assessment/Plan:        Diagnoses and all orders for this visit:    Calculus of ureter  -     Urinalysis Macroscopic  -     Discontinue: potassium citrate (UROCIT-K) 10 mEq (1,080 mg) SR tablet; Take 3 tablets (30 mEq total) by mouth 2 (two) times a day with meals.  Dispense: 360 tablet; Refill: 3  -     Urine, PH; Future; Expected date: 06/10/2019  -     Symptom Control While Passing a Stone Education  -     Patient Stated Goal: Pass my stone  -     Patient Stated Goal: Prevent further stones  -     potassium citrate (UROCIT-K) 10 mEq (1,080 mg) SR tablet; Take 3 tablets (30 mEq total) by mouth 2 (two) times a day with meals.  Dispense: 360 tablet; Refill: 2    Nephrolithiasis, uric acid  -     Discontinue: potassium citrate (UROCIT-K) 10 mEq (1,080 mg) SR tablet; Take 3 tablets (30 mEq total) by mouth 2 (two) times a day with meals.  Dispense: 360 tablet; Refill: 3  -     Urine, PH; Future; Expected date: 06/10/2019  -     potassium citrate (UROCIT-K) 10 mEq (1,080 mg) SR tablet; Take 3 tablets (30 mEq total) by mouth 2 (two) times a day with meals.  Dispense: 360 tablet; Refill: 2    Hydronephrosis with urinary obstruction due to ureteral calculus    Other orders  -     ondansetron (ZOFRAN ODT) 4 MG disintegrating tablet; Take 4 mg by mouth.  -     oxyCODONE-acetaminophen (PERCOCET/ENDOCET) 5-325 mg per tablet; Take 1-2 tablets by mouth.  -     tamsulosin (FLOMAX) 0.4 mg cap; Take 0.4 mg by mouth.  -     lovastatin (MEVACOR) 40 MG tablet; Take 40 mg by mouth.      Stone Management Plan  [facility] Stone Management 11/13/2017 12/14/2017 6/3/2019   Urinary Tract Infection No suspicion of infection No suspicion of infection No suspicion of infection   Renal Colic Asymptomatic at this time Asymptomatic at this time Well controlled symptoms   Renal Failure No suspicion of renal failure No suspicion of renal failure No suspicion of renal failure   Current CT date 11/13/2017 12/14/2017 5/31/2019   Right sided stones? Yes No No    R Number of ureteral stones 1 - -   R GSD of ureteral stones 2 - -   R Location of ureteral stone Proximal - -   R Hydronephrosis - None -   R Stone Event Established event Resolved event No current event   Resolved date - 12/14/2017 -   R Post-op status <2 mm - -   R Current Plan MET - -   Left sided stones? No No Yes   L Number of ureteral stones - - 1   L GSD of ureteral stones - - 3   L Location of ureteral stone - - Proximal   L GSD of kidney stones - - < 2   L Hydronephrosis - - Moderate   L Stone Event No current event No current event New event   Diagnosis date - - 5/31/2019   Initial location of primary symptomatic stone - - Proximal   Initial GSD of primary symptomatic stone - - 3   L MET Status - - Initiation   L Current Plan - - Alkalinization   Alkalinization - - Initiation             Subjective:      HPI  Ms. Amalia Harris is a 69 y.o. [place] female returning to the [facility] for unanticipated visit with acute exacerbation of chronic stone disease.      She is a rapidly recurrent uric acid stone former who has required stone clearance procedures. She has previously participated in stone risk evaluation and remains adherent to recommendations. She has identified modifiable stone risks including:  low urine volume, hypocitraturia and acidic urine. She has identified non-modifiable stone risks including:  multiple stones at presentation.    She was last seen in 2017 with prevention workup notable for stone risks factors of low urine volume, acidic urine and hypocitraturia. She was educated on dietary prevention measures and was to return in 6 months with imaging.    She required emergent visit to [facility] 5/31/19 for acute onset left flank pain. The pain was sharp and radiated to the left lower abdomen. She took tylenol with no relief. Significant associated symptoms at presentation included:  nausea and vomiting. Workup was notable for mild renal injury and hematuria with mild  leukocytosis. CT scan reported a 4 mm left proximal ureteral stone with moderate hydronephrosis and ipsilateral renal stones. She was sent home with flomax, zofran and percocet.     She states resolution of left flank pain but has mild suprapubic pain. She had dysuria yesterday, resolved. She denies current symptoms of fever, chills, flank pain, nausea, vomiting, urinary frequency and dysuria.     CT scan from 5/31/19 is personally reviewed and demonstrates a low density 3-4 mm left proximal ureteral stone with additional debris proximal to stone. There is a low density collection of stone with left lower pole. Stones are all new from 2017 imaging.    Significant labs from presentation include moderate hematuria, no pyuria, negative nitrite, no bacteria, mildly elevated WBC, slightly elevated creatinine and normal potassium.    Will proceed with medical expulsive therapy. Will initiate potassium citrate therapy at 30 mEq two times a day in attempt to alkalize urine and dissolve current stone burden. Risks and benefits were detailed of medical expulsive therapy including probability of stone passage, recurrent renal colic, and requirement of emergency medical and/or surgical care and further imaging. Patient verbalized understanding. Patient agrees with plan as discussed. She will need to obtain updated urine pH in 1 week and titrate citrate dose to goal pH of 6.5-7.    Will repeat CT scan in 3 months.    For symptom control, she was prescribed Percocet, ondansetron and Flomax. Over the counter symptom control medications of ibuprofen, Dramamine and Tylenol were recommended.    Patient also seen and examined by Monse Hwang PA-C       ROS   Review of systems is negative except for HPI.    Past Medical History:   Diagnosis Date     CHF (congestive heart failure) (H)      Hydronephrosis     with renal colic, urinary obstruction     Hypercholesteremia      Kidney stone     left       Past Surgical History:   Procedure  Laterality Date     CHOLECYSTECTOMY       [place] CYSTO/URETERO W/LITHOTRIPSY &INDWELL STENT INSRT Right 9/29/2017    Procedure: CYSTOSCOPY, RIGHT URETEROSCOPY LASER LITHOTRIPSY STENT INSERTION;  Surgeon: Dominguez Stoner MD;  Location: [facility];  Service: Urology       Current Outpatient Medications   Medication Sig Dispense Refill     furosemide (LASIX) 20 MG tablet Take 1 tablet (20 mg total) by mouth 2 (two) times a day as needed (take 20 mg twice daily if weigh gain more jenn 2-3 lbs in a week until weight is back to normal). 30 tablet 0     lisinopril (PRINIVIL,ZESTRIL) 10 MG tablet Take 1 tablet (10 mg total) by mouth daily. 30 tablet 0     losartan (COZAAR) 25 MG tablet Take 25 mg by mouth.       lovastatin (MEVACOR) 40 MG tablet Take 40 mg by mouth.       ondansetron (ZOFRAN ODT) 4 MG disintegrating tablet Take 4 mg by mouth.       oxyCODONE-acetaminophen (PERCOCET/ENDOCET) 5-325 mg per tablet Take 1-2 tablets by mouth.       tamsulosin (FLOMAX) 0.4 mg cap Take 0.4 mg by mouth.       potassium citrate (UROCIT-K) 10 mEq (1,080 mg) SR tablet Take 3 tablets (30 mEq total) by mouth 2 (two) times a day with meals. 360 tablet 3     No current facility-administered medications for this visit.        Allergies   Allergen Reactions     Adhesive Tape-Silicones Rash     Tele patches         Social History     Socioeconomic History     Marital status: Legally      Spouse name: Not on file     Number of children: Not on file     Years of education: Not on file     Highest education level: Not on file   Occupational History     Not on file   Social Needs     Financial resource strain: Not on file     Food insecurity:     Worry: Not on file     Inability: Not on file     Transportation needs:     Medical: Not on file     Non-medical: Not on file   Tobacco Use     Smoking status: Never Smoker     Smokeless tobacco: Never Used   Substance and Sexual Activity     Alcohol use: No     Drug use: No     Sexual  activity: Not on file   Lifestyle     Physical activity:     Days per week: Not on file     Minutes per session: Not on file     Stress: Not on file   Relationships     Social connections:     Talks on phone: Not on file     Gets together: Not on file     Attends Jain service: Not on file     Active member of club or organization: Not on file     Attends meetings of clubs or organizations: Not on file     Relationship status: Not on file     Intimate partner violence:     Fear of current or ex partner: Not on file     Emotionally abused: Not on file     Physically abused: Not on file     Forced sexual activity: Not on file   Other Topics Concern     Not on file   Social History Narrative     Not on file       Family History   Problem Relation Age of Onset     Asthma Father      Objective:      Physical Exam  Vitals:    06/03/19 1438   BP: 139/73   Pulse: 89   Temp: 98  F (36.7  C)     General - well developed, well nourished, appropriate for age. Appears no distress at this time  Abdomen - mildly obese soft, non-tender, no hepatosplenomegaly, no masses.   - no flank tenderness, no suprapubic tenderness, kidney and bladder non-palpable  MSK - normal spinal curvature. no spinal tenderness. normal gait. muscular strength intact.  Psych - oriented to time, place, and person, normal mood and affect.      Labs   Urinalysis POC (Office):  Nitrite, UA   Date Value Ref Range Status   06/03/2019 Negative Negative Final   12/14/2017 Negative Negative Final   11/13/2017 Negative Negative Final       Lab Urinalysis:  Blood, UA   Date Value Ref Range Status   06/03/2019 Moderate (!) Negative Final   12/14/2017 Negative Negative Final   11/13/2017 Moderate (!) Negative Final     Nitrite, UA   Date Value Ref Range Status   06/03/2019 Negative Negative Final   12/14/2017 Negative Negative Final   11/13/2017 Negative Negative Final     Leukocytes, UA   Date Value Ref Range Status   06/03/2019 Small (!) Negative Final    12/14/2017 Negative Negative Final   11/13/2017 Negative Negative Final     pH, UA   Date Value Ref Range Status   06/03/2019 5.5 5.0 - 8.0 Final   12/14/2017 5.5 5.0 - 8.0 Final   11/13/2017 6.0 5.0 - 8.0 Final    and Acute Labs   CBC   WBC   Date Value Ref Range Status   09/26/2017 7.1 4.0 - 11.0 thou/uL Final   08/30/2017 9.6 4.0 - 11.0 thou/uL Final     Hemoglobin   Date Value Ref Range Status   09/26/2017 12.4 12.0 - 16.0 g/dL Final   08/30/2017 13.6 12.0 - 16.0 g/dL Final     Platelets   Date Value Ref Range Status   09/26/2017 260 140 - 440 thou/uL Final   08/30/2017 275 140 - 440 thou/uL Final    and Renal Panel  [facility]  Creatinine   Date Value Ref Range Status   11/13/2017 0.74 0.60 - 1.10 mg/dL Final   09/26/2017 0.80 0.60 - 1.10 mg/dL Final   08/30/2017 0.83 0.60 - 1.10 mg/dL Final     Potassium   Date Value Ref Range Status   11/13/2017 3.9 3.5 - 5.0 mmol/L Final   09/26/2017 4.0 3.5 - 5.0 mmol/L Final   08/30/2017 3.6 3.5 - 5.0 mmol/L Final     Calcium   Date Value Ref Range Status   11/13/2017 8.6 8.5 - 10.5 mg/dL Final   09/26/2017 9.1 8.5 - 10.5 mg/dL Final   08/30/2017 8.9 8.5 - 10.5 mg/dL Final

## 2021-05-29 NOTE — TELEPHONE ENCOUNTER
Spoke to patient letting her know to puck up antibiotic Cipro to start per Monse Hwang PA-C as her UC result came back positive. Provider recommended patient be seen next week on 6/20/2019 with a CT scan for follow up on her ureteral stone and potassium citrate medication check.  Patient understand and agree with plan.

## 2021-05-29 NOTE — PATIENT INSTRUCTIONS - HE
Potassium Citrate    This medication can be used in two ways to help stone formers.      Citrate in the urine makes it harder for stones to form and grow. Many stone patients do not have enough citrate in their urine and require a supplement.        Citrate is an effective way to make the urine less acidic. Uric acid stones only form when the urine is acidic and may dissolve if urine acidity can be reversed.      USES: This medication is used for low urinary citrate (hypocitraturia), renal tubular acidosis, uric acid stones, calcium oxalate stones, and cysteine stones.    HOW TO USE:     Potassium citrate comes in several different forms      Tablet - made of potassium citrate in a wax substance to allow delayed absorption. It is common to see a  ghost tablet  in the stool after all the potassium citrate has been absorbed. This is more common in people with diarrhea or other bowel disorders.      Powder - this can be dissolved in water or other clear fluids. To decrease stomach irritation and improve absorption, we recommend dissolving in at least 500 cc of fluid and drinking slowly. You do not have to drink this medication all at once. Many patients find the taste is improved by adding a sweetener.      Liquid - Similar to the powder, the liquid formulation is best dissolved in a large container of fluid and consumed slowly.     SIDE EFFECTS:  The primary side effect of this medication is stomach irritation, less so with the tablet. Some people find stomach irritation decreased by taking the medication with food and avoiding lying down for at least a half hour after consumption.    If you develop diarrhea, nausea, vomiting, stomach pain, fluid retention, convulsions, unusual weakness, mental confusion, tingling or numbness of the hands or feet or other potential side effects, notify your doctor or pharmacist promptly.                    *This is a summary and does not contain all possible information about this  product. For complete information about this product or your specific health needs, ask your healthcare professional. Always seek the advice of your healthcare professional if you have any questions about this product or your medical condition.    Patient Stated Goal: Pass my stone  Patient Stated Goal: Prevent further stones  Symptom Control While Passing A Stone    The goal of [facility] [place] is to let a smaller kidney stone (less than 4 to 5 mm) pass without intervention if possible. Giving your body a chance to clear the stone may take a few hours up to a few weeks.  Keeping you well-informed, safe and fairly comfortable is important.    Drink to thirst  Do not attempt to  flush out  your stone by drinking too much fluid. This does not work and may increase nausea. Drink enough to satisfy your body s thirst. Eating your normal diet is fine.   Medications (that may be suggested or prescribed)    Ibuprofen (Advil or Motrin) Available over the counter  o Take two (200mg) tablets every six hours until the stone passes.  o Prevents spasm of the ureter.    o Decreases pain.      Dramamine* (drowsy version, non-generic formulation) Available over the counter  o Take 50mg at bedtime  o Decreases spasms of the ureter  o Decreases nausea  o Decreases acute pain  o Decreases recurrence of pain for 24 hours  o Will help you sleep  *This medication will cause increase drowsiness, do not drive or operate machinery for 6 hours.      Narcotics (Percocet, Vicodin, Dilaudid) Take as prescribed for severe pain unrelieved by ibuprofen and Dramamine  o Narcotics have significant side effects and only  cover-up  pain. They have no effect on the cause of pain.  o Common side effects  - Confusion, disorientation and sedation - DO NOT DRIVE OR OPERATE MACHINERY WITHIN 24 HOURS  - Nausea - take Dramamine or Zofran or Haldol to help control  - Constipation  - Sleep disturbances      Ondansetron (Zofran) Take as  prescribed  o Reserve for severe nausea  o May cause constipation, start over the counter Miralax if needed      Second Line Anti-Nausea Medication: Adding a different anti-nausea medication maybe helpful for persistent nausea.  The combined effect of different types of anti-nausea medications maybe more effective than either medication by itself, even in higher doses.  o Compazine: Take as prescribed      Information about kidney stones    Crystals can form if chemicals are too concentrated in your urine. If the crystal grows over time, a stone may form. A stone usually isn t painful while it is still in the kidney.    As the stone begins to leave the kidney, you may experience episodes of flank pain as the kidney stone approaches the entrance to the ureter. Some people feel a vague ache in the side.    Kidney stones may fall into the ureter. Some stones are tiny and pass through without causing symptoms. The ureter is a small tube (approximately 1/8 of an inch wide). A kidney stone can get stuck and block the ureter. If this happens, urine backs up and flows back to the kidney. Back pressure on the kidney can cause:  o Severe flank pain radiating to the groin.  o Severe nausea and vomiting.  o The pain can occur in the lower back, side, groin or all three.      When the stone reaches the lower ureter, this can irritate the bladder and sensations of feeling the urge to urinate frequently and urgently may occur.      Once the stone passes out of your ureter and into your bladder, the symptoms of urgency and frequency will often disappear. Sometimes pain will come back for a short period and will not be as severe as before. The passage of the stone from your bladder and out of your body is usually not a problem. The urethra is at least twice as wide as the normal ureter, so the stone doesn t usually block it.    Strain all urine  If you pass the stone, save it. Place it in the container we have provided and bring it  to the [facility] [place] within a week of passing it. Your stone will then be sent for analysis which takes about a month.     Signs and symptoms you might experience    Nausea    Decreased appetite    Urinary frequency    Bloody urine     Chills    Fatigue    When to call [facility] [facility] or go to the Emergency Room    Fever with a temperature greater than 100.1    Severe pain    Persistent nausea/vomiting    If the pain worsens or nausea/vomiting is uncontrolled with medications, STOP eating & drinking. You need to have an empty stomach for 8 hours prior to surgery. Call the [facility] [facility] immediately at [phone].           Follow-up    Low dose CT scan with doctor visit 1-2 weeks after initial clinic visit per doctor s instructions    Please cancel the CT scan visit if you pass a stone. Reschedule for a one month follow-up with doctor to discuss stone composition and future prevention.    Preventing future stones    Approximately a month after your stone is sent out for analysis, a prevention visit will occur with your provider, to discuss an individualized plan for prevention of new stones and to discuss managing stones that you may still have. Along with the analysis of the kidney stone, blood and urine tests may be indicated to develop this plan. Knowing the type of kidney stones you make, and why, allows the providers at the [facility] to recommend specific ways to prevent them.    Follow-up visits are an important part of monitoring and preventing future re-occurrences.    The [facility] [place] [facility] is available for questions or concerns 24 hours a day at [phone]

## 2021-05-29 NOTE — PROGRESS NOTES
Patient presents to the office today for a follow up from a recent Emergency Room visit from [facility].  Pat has a 4mm left ureteral stone.

## 2021-05-29 NOTE — PROGRESS NOTES
Assessment/Plan:        Diagnoses and all orders for this visit:    Calculus of ureter  -     Urinalysis Macroscopic  -     CT Abdomen Pelvis Without Oral Without IV Contrast; Future; Expected date: 12/17/2019  -     Patient Stated Goal: Prevent further stones  -     Potassium Citrate Education  -     Patient Increasing Fluids Education  -     potassium citrate (UROCIT-K) 10 mEq (1,080 mg) SR tablet; Take 3 tablets (30 mEq total) by mouth 2 (two) times a day with meals.  Dispense: 360 tablet; Refill: 2    Hydronephrosis with urinary obstruction due to ureteral calculus    Nephrolithiasis, uric acid  -     potassium citrate (UROCIT-K) 10 mEq (1,080 mg) SR tablet; Take 3 tablets (30 mEq total) by mouth 2 (two) times a day with meals.  Dispense: 360 tablet; Refill: 2      Stone Management Plan  [facility] Stone Management 11/13/2017 12/14/2017 6/3/2019   Urinary Tract Infection No suspicion of infection No suspicion of infection No suspicion of infection   Renal Colic Asymptomatic at this time Asymptomatic at this time Well controlled symptoms   Renal Failure No suspicion of renal failure No suspicion of renal failure No suspicion of renal failure   Current CT date 11/13/2017 12/14/2017 5/31/2019   Right sided stones? Yes No No   R Number of ureteral stones 1 - -   R GSD of ureteral stones 2 - -   R Location of ureteral stone Proximal - -   R Hydronephrosis - None -   R Stone Event Established event Resolved event No current event   Resolved date - 12/14/2017 -   R Post-op status <2 mm - -   R Current Plan MET - -   Left sided stones? No No Yes   L Number of ureteral stones - - 1   L GSD of ureteral stones - - 3   L Location of ureteral stone - - Proximal   L GSD of kidney stones - - < 2   L Hydronephrosis - - Moderate   L Stone Event No current event No current event New event   Diagnosis date - - 5/31/2019   Initial location of primary symptomatic stone - - Proximal   Initial GSD of primary symptomatic stone - - 3   L MET  Status - - Initiation   L Current Plan - - Alkalinization   Alkalinization - - Initiation         Subjective:      HPI  Ms. Amalia Harris is a 69 y.o. Hmong female returning to the [facility] [place] for medical expulsive therapy follow up.     On last encounter, her 4 mm stone was in left proximal ureter with mild hydronephrosis. She has had outpatient management of urinary tract infection.    She has no further issues with pain, fever or chills. She is asymptomatic at present. She denies symptoms of fever, chills, flank pain, nausea, vomiting, urinary frequency and dysuria. She has 1 day remaining of ciprofloxacin.    Urine pH today is down to 5.5.    New CT scan was personally reviewed and demonstrates passage of stone with resolution of previous hydronephrosis. There is a linear calcification within left lower pole appears to be associated with cyst.    PLAN    70 yo [place] F with hx of rapidly recurrent uric acid stone disease, previous risk factors of low urine volume, acidic urine and hypocitraturia. Interval passage of left ureteral stones, maintained on potassium citrate. Currently on antibiotic for recent e. Coli UTI, now asymptomatic.    She is congratulated on passing her stone. She will continue on potassium citrate. She will go back to potassium citrate 30 mEq two times a day and follow up with repeat imaging in 6 months.    Patient also seen and examined by Monse Hwang PA-C       ROS   Review of systems is negative except for HPI.    Past Medical History:   Diagnosis Date     CHF (congestive heart failure) (H)      Hydronephrosis     with renal colic, urinary obstruction     Hypercholesteremia      Kidney stone     left       Past Surgical History:   Procedure Laterality Date     CHOLECYSTECTOMY       [place] CYSTO/URETERO W/LITHOTRIPSY &INDWELL STENT INSRT Right 9/29/2017    Procedure: CYSTOSCOPY, RIGHT URETEROSCOPY LASER LITHOTRIPSY STENT INSERTION;  Surgeon: Dominguez Stoner MD;  Location: [facility];  Service: Urology       Current Outpatient Medications   Medication Sig Dispense Refill     ciprofloxacin HCl (CIPRO) 500 MG tablet Take 1 tablet (500 mg total) by mouth 2 (two) times a day for 7 days. 14 tablet 0     furosemide (LASIX) 20 MG tablet Take 1 tablet (20 mg total) by mouth 2 (two) times a day as needed (take 20 mg twice daily if weigh gain more jenn 2-3 lbs in a week until weight is back to normal). 30 tablet 0     lisinopril (PRINIVIL,ZESTRIL) 10 MG tablet Take 1 tablet (10 mg total) by mouth daily. 30 tablet 0     losartan (COZAAR) 25 MG tablet Take 25 mg by mouth.       lovastatin (MEVACOR) 40 MG tablet Take 40 mg by mouth.       ondansetron (ZOFRAN ODT) 4 MG disintegrating tablet Take 4 mg by mouth.       potassium citrate (UROCIT-K) 10 mEq (1,080 mg) SR tablet Take 3 tablets (30 mEq total) by mouth 2 (two) times a day with meals. (Patient taking differently: Take 40 mEq by mouth 2 (two) times a day with meals.       ) 360 tablet 2     oxyCODONE-acetaminophen (PERCOCET/ENDOCET) 5-325 mg per tablet Take 1-2 tablets by mouth.       No current facility-administered medications for this visit.        Allergies   Allergen Reactions     Adhesive Tape-Silicones Rash     Tele patches         Social History     Socioeconomic History     Marital status: Legally      Spouse name: Not on file     Number of children: Not on file     Years of education: Not on file     Highest education level: Not on file   Occupational History     Not on file   Social Needs     Financial resource strain: Not on file     Food insecurity:     Worry: Not on file     Inability: Not on file     Transportation needs:     Medical: Not on file     Non-medical: Not on file   Tobacco Use     Smoking status: Never Smoker     Smokeless tobacco: Never Used   Substance and Sexual Activity     Alcohol use: No     Drug use: No     Sexual activity: Not on file   Lifestyle     Physical activity:     Days per week: Not on  file     Minutes per session: Not on file     Stress: Not on file   Relationships     Social connections:     Talks on phone: Not on file     Gets together: Not on file     Attends Nondenominational service: Not on file     Active member of club or organization: Not on file     Attends meetings of clubs or organizations: Not on file     Relationship status: Not on file     Intimate partner violence:     Fear of current or ex partner: Not on file     Emotionally abused: Not on file     Physically abused: Not on file     Forced sexual activity: Not on file   Other Topics Concern     Not on file   Social History Narrative     Not on file       Family History   Problem Relation Age of Onset     Asthma Father      Objective:      Physical Exam  Vitals:    06/20/19 1034   BP: 136/71   Pulse: 72   Temp: 97.8  F (36.6  C)     General - well developed, well nourished, appropriate for age. Appears no distress at this time  Abdomen - mildly obese soft, non-tender, no hepatosplenomegaly, no masses.   - no flank tenderness, no suprapubic tenderness, kidney and bladder non-palpable  MSK - normal spinal curvature. no spinal tenderness. normal gait. muscular strength intact.  Psych - oriented to time, place, and person, normal mood and affect.      Labs   Urinalysis POC (Office):  Nitrite, UA   Date Value Ref Range Status   06/20/2019 Negative Negative Final   06/12/2019 Negative Negative Final   06/03/2019 Negative Negative Final       Lab Urinalysis:  Blood, UA   Date Value Ref Range Status   06/20/2019 Trace (!) Negative Final   06/12/2019 Negative Negative Final   06/03/2019 Moderate (!) Negative Final     Nitrite, UA   Date Value Ref Range Status   06/20/2019 Negative Negative Final   06/12/2019 Negative Negative Final   06/03/2019 Negative Negative Final     Leukocytes, UA   Date Value Ref Range Status   06/20/2019 Negative Negative Final   06/12/2019 Small (!) Negative Final   06/03/2019 Small (!) Negative Final     pH, UA   Date  Value Ref Range Status   06/20/2019 5.5 5.0 - 8.0 Final   06/12/2019 6.5 4.5 - 8.0 Final   06/03/2019 5.5 5.0 - 8.0 Final

## 2021-05-31 VITALS — HEIGHT: 59 IN | BODY MASS INDEX: 35.1 KG/M2 | WEIGHT: 174.13 LBS

## 2021-06-01 NOTE — TELEPHONE ENCOUNTER
Refills of potassium citrate sent to patient's pharmacy for her kidney stones.  Rohini Cooper RN

## 2021-06-03 VITALS — WEIGHT: 174 LBS | BODY MASS INDEX: 36.37 KG/M2

## 2021-06-12 ENCOUNTER — HEALTH MAINTENANCE LETTER (OUTPATIENT)
Age: 71
End: 2021-06-12

## 2021-06-13 NOTE — ANESTHESIA PREPROCEDURE EVALUATION
Anesthesia Evaluation      Patient summary reviewed   No history of anesthetic complications     Airway   Mallampati: III  Neck ROM: full  Comment: Small mouth opening   Pulmonary      ROS comment: Recent pulmonary edema, started on diuretics (see below)  PE comment: Bibasilar crackles                         Cardiovascular   (+) hypertension poorly controlled, CHF (dyspnea on exertion noted during prior admission. see below. Denies dyspnea or orthopnea currently. Room air sat 97.), ,     (-) past MI, CAD, angina, murmur  ECG reviewed  Rhythm: regular  Rate: normal,    no murmur      Neuro/Psych - negative ROS     Endo/Other - negative ROS   (+) obesity,      GI/Hepatic/Renal    (+)   chronic renal disease (nephrolithiasis),      Other findings: Echo 9/26:    Left ventricle ejection fraction is normal. The estimated left ventricular ejection fraction is 60%.    Aortic valve not well visualized. Mild stenosis.    Mild tricuspid valve regurgitation. Mild pulmonary hypertension present.    There is moderate mitral annular calcification. Mild regurgitation.    No previous study for comparison.    CXR 9/26:  FINDINGS: Mildly enlarged heart. Engorged indistinct central pulmonary vasculature, correlate for component of interstitial pulmonary edema No pneumothorax or pleural effusion.    Started on Lasix during admission but pt did not continue taking after discharge.      Dental    (+) upper dentures and poor dentition    Comment: Upper partial dentures                       Anesthesia Plan  Planned anesthetic: general endotracheal    ASA 3   Induction: intravenous   Anesthetic plan and risks discussed with: patient and  services used  Anesthesia plan special considerations: video-assisted,   Post-op plan: routine recovery

## 2021-06-13 NOTE — PROGRESS NOTES
Assessment/Plan:        Diagnoses and all orders for this visit:    Calculus of ureter  -     Cystoscopy with Stent Removal Education  -     CT Abdomen Pelvis Without Oral Without IV Contrast; Future; Expected date: 11/9/17  -     Patient Stated Goal: Prevent further stones  -     ciprofloxacin HCl tablet 250 mg (CIPRO); Take 1 tablet (250 mg total) by mouth once.  -     Urinalysis Macroscopic    Calculus of kidney  -     CT Abdomen Pelvis Without Oral Without IV Contrast; Future; Expected date: 11/9/17  -     ciprofloxacin HCl tablet 250 mg (CIPRO); Take 1 tablet (250 mg total) by mouth once.    Other orders  -     ciprofloxacin HCl (CIPRO) 250 MG tablet;       Stone Management Plan  [facility] Stone Management 10/10/2017   Urinary Tract Infection No suspicion of infection   Renal Colic Well controlled symptoms   Renal Failure No suspicion of renal failure   R Stone Event Established event   R Post-op status Stent Removal   L Stone Event No current event             Subjective:      HPI  Ms. Amalia Harris is a 67 y.o. H[place] female returning to the [facility] [place] for early postoperative follow up for anticipated stent removal.     She returns status post right ureteroscopic laser lithotripsy for renal and ureteral stone. She has had no unanticipated post-operative events.    She has had no symptoms suspicious for infection and stent was mildly symptomatic with typical issues of flank discomfort and lower urinary tract irritation.     Flexible cystoscopy is performed and indwelling stent is removed without incident. and There was  mild uric acid stent encrustation.    She will follow up in the office in one month with imaging.       ROS   Review of systems is negative except for HPI.    Past Medical History:   Diagnosis Date     CHF (congestive heart failure)      Hydronephrosis     with renal colic, urinary obstruction     Hypercholesteremia      Kidney stone     left       Past Surgical History:   Procedure  Laterality Date     CHOLECYSTECTOMY       [place] CYSTO/URETERO W/LITHOTRIPSY &INDWELL STENT INSRT Right 9/29/2017    Procedure: CYSTOSCOPY, RIGHT URETEROSCOPY LASER LITHOTRIPSY STENT INSERTION;  Surgeon: Dominguez Stoner MD;  Location: [facility];  Service: Urology       Current Outpatient Prescriptions   Medication Sig Dispense Refill     dimenhyDRINATE (DRAMAMINE) 50 mg Chew Chew 1 tablet (50 mg total) at bedtime as needed (for nausea). 20 each 0     furosemide (LASIX) 20 MG tablet Take 1 tablet (20 mg total) by mouth 2 (two) times a day as needed (take 20 mg twice daily if weigh gain more jenn 2-3 lbs in a week until weight is back to normal). 30 tablet 0     lisinopril (PRINIVIL,ZESTRIL) 10 MG tablet Take 1 tablet (10 mg total) by mouth daily. 30 tablet 0     oxyCODONE (ROXICODONE) 5 MG immediate release tablet Take 1 tablet (5 mg total) by mouth every 4 (four) hours as needed. 15 tablet 0     tamsulosin (FLOMAX) 0.4 mg Cp24 Take 1 capsule (0.4 mg total) by mouth daily after supper. 30 capsule 0     Current Facility-Administered Medications   Medication Dose Route Frequency Provider Last Rate Last Dose     ciprofloxacin HCl (CIPRO) 250 MG tablet                Allergies   Allergen Reactions     Adhesive Tape-Silicones Rash     Tele patches         Social History     Social History     Marital status: Legally      Spouse name: N/A     Number of children: N/A     Years of education: N/A     Occupational History     Not on file.     Social History Main Topics     Smoking status: Never Smoker     Smokeless tobacco: Never Used     Alcohol use No     Drug use: No     Sexual activity: Not on file     Other Topics Concern     Not on file     Social History Narrative       Family History   Problem Relation Age of Onset     Asthma Father      Objective:      Physical Exam  Vitals:    10/10/17 1344   BP: 152/77   Pulse: 77   Temp: 98.2  F (36.8  C)     General - well developed, well nourished, appropriate for  age. Appears no distress at this time  Abdomen - moderately obese soft, non-tender, no hepatosplenomegaly, no masses.   - no flank tenderness, no suprapubic tenderness, kidney and bladder non-palpable  MSK - normal spinal curvature. no spinal tenderness. normal gait. muscular strength intact.  Psych - oriented to time, place, and person, normal mood and affect.        Labs   Urinalysis POC (Office):  Nitrite, UA   Date Value Ref Range Status   10/10/2017 Negative Negative Final   10/05/2017 Negative Negative Final   09/26/2017 Negative Negative Final       Lab Urinalysis:  Blood, UA   Date Value Ref Range Status   10/10/2017 Large (!) Negative Final   10/05/2017 Large (!) Negative Final   09/26/2017 Moderate (!) Negative Final     Nitrite, UA   Date Value Ref Range Status   10/10/2017 Negative Negative Final   10/05/2017 Negative Negative Final   09/26/2017 Negative Negative Final     Leukocytes, UA   Date Value Ref Range Status   10/10/2017 Small (!) Negative Final   10/05/2017 Moderate (!) Negative Final   09/26/2017 Trace (!) Negative Final     pH, UA   Date Value Ref Range Status   10/10/2017 5.5 5.0 - 8.0 Final   10/05/2017 5.5 5.0 - 8.0 Final   09/26/2017 5.0 4.5 - 8.0 Final

## 2021-06-13 NOTE — ANESTHESIA CARE TRANSFER NOTE
Last vitals:   Vitals:    09/29/17 1200   BP: (!) 136/92   Pulse: 71   Resp: 16   Temp: 36.4  C (97.5  F)   SpO2: 100%     Patient's level of consciousness is drowsy  Spontaneous respirations: yes  Maintains airway independently: yes  Dentition unchanged: yes  Oropharynx: oropharynx clear of all foreign objects    QCDR Measures:  ASA# 20 - Surgical Safety Checklist: WHO surgical safety checklist completed prior to induction  PQRS# 430 - Adult PONV Prevention: 4558F - Pt received => 2 anti-emetic agents (different classes) preop & intraop  ASA# 8 - Peds PONV Prevention: NA - Not pediatric patient, not GA or 2 or more risk factors NOT present  PQRS# 424 - Darling-op Temp Management: 4559F - At least one body temp DOCUMENTED => 35.5C or 95.9F within required timeframe  PQRS# 426 - PACU Transfer Protocol: - Transfer of care checklist used  ASA# 14 - Acute Post-op Pain: ASA14B - Patient did NOT experience pain >= 7 out of 10

## 2021-06-13 NOTE — ANESTHESIA POSTPROCEDURE EVALUATION
Patient: Amalia Harris  CYSTOSCOPY, RIGHT URETEROSCOPY LASER LITHOTRIPSY STENT INSERTION  Anesthesia type: general    Patient location: PACU  Last vitals:   Vitals:    09/29/17 1245   BP: 140/75   Pulse: 66   Resp: 20   Temp: 36.7  C (98.1  F)   SpO2: 99%     Post vital signs: stable  Level of consciousness: awake, alert and responds to simple questions  Post-anesthesia pain: pain controlled  Post-anesthesia nausea and vomiting: no  Pulmonary: unassisted, nasal cannula  Cardiovascular: stable and blood pressure at baseline  Hydration: adequate  Anesthetic events: no    QCDR Measures:  ASA# 11 - Darling-op Cardiac Arrest: ASA11B - Patient did NOT experience unanticipated cardiac arrest  ASA# 12 - Darling-op Mortality Rate: ASA12B - Patient did NOT die  ASA# 13 - PACU Re-Intubation Rate: ASA13B - Patient did NOT require a new airway mgmt  ASA# 10 - Composite Anes Safety: ASA10A - No serious adverse event    Additional Notes:

## 2021-06-14 NOTE — PROGRESS NOTES
Assessment/Plan:        Diagnoses and all orders for this visit:    Calculus of ureter    Hypocitraturia  -     Patient Stated Goal: Prevent further stones  -     Patient Increasing Fluids Education    Low urine output  -     Patient Increasing Fluids Education    Uric acid nephrolithiasis  -     CT Abdomen Pelvis Without Oral Without IV Contrast; Future; Expected date: 6/12/18  -     Patient Stated Goal: Prevent further stones    Urinary tract stones  -     Urinalysis Macroscopic    Other orders  -     losartan (COZAAR) 25 MG tablet; Take 25 mg by mouth.      Stone Management Plan  [facility] Stone Management 10/10/2017 11/13/2017 12/14/2017   Urinary Tract Infection No suspicion of infection No suspicion of infection No suspicion of infection   Renal Colic Well controlled symptoms Asymptomatic at this time Asymptomatic at this time   Renal Failure No suspicion of renal failure No suspicion of renal failure No suspicion of renal failure   Current CT date - 11/13/2017 12/14/2017   Right sided stones? - Yes No   R Number of ureteral stones - 1 -   R GSD of ureteral stones - 2 -   R Location of ureteral stone - Proximal -   R Hydronephrosis - - None   R Stone Event Established event Established event Resolved event   Resolved date - - 12/14/2017   R Post-op status Stent Removal <2 mm -   R Current Plan - MET -   Left sided stones? - No No   L Stone Event No current event No current event No current event         Subjective:      HPI  Ms. Amalia Harris is a 67 y.o. H[place] female returning to the [facility] [place] for review of initial stone risk evaluation.     She is a first time uric acid stone former who has required stone clearance procedures. She has not previously participated in stone risk evaluation. She has identified modifiable stone risks including:  low urine volume, hypocitraturia and acidic urine. She has identified non-modifiable stone risks including:  multiple stones at presentation.     She is  asymptomatic at present. She had a small residual right proximal ureteral stone identified on previous postoperative imaging. She has been straining her urine and completed prescription of flomax. She has not seen a stone. She denies symptoms of fever, chills, flank pain, nausea, vomiting, urinary frequency and dysuria.     Evaluation of serum lab results demonstrates no significant abnormalities, normal venous calcium and no hyperuricemia. Two 24 hour urine collections demonstrate severe low urine volume (1100, 1350 mL), creatinine (983, 1060 mg), calcium (122, 194 mg), sodium (64, 104 mmol), mild hypocitraturia (282, 262 mg), oxalate (18, 31 mg), and acidic urine (pH 5.5 on both). Dietary journal demonstrates: low sodium consumption, low oxalate consumption and low purine consumption.    CT scan from today is personally reviewed and demonstrates absence of previous residual 2 mm right proximal ureteral stone fragment. No current hydronephrosis. No current ureteral stones.    PLAN    68 yo H[place] F first time uric acid stone former s/p ureteroscopic clearance of right ureteral and renal stones. Interval passage of residual small right ureteral calculus, asymptomatic. Initial risk evaluation significant for severely low urine volume, acidic urine and mild hypocitraturia.    Based on review of findings with the patient, she will transition to long term management and return in 6 months with repeat imaging. She will initiate increased fluid consumption to generate at least 2,000 mL urine daily and initiate increased consumption of high citrate beverages/foods.    Patient also seen and examined by NARCISO Cornelius   Review of systems is negative except for HPI.    Past Medical History:   Diagnosis Date     CHF (congestive heart failure)      Hydronephrosis     with renal colic, urinary obstruction     Hypercholesteremia      Kidney stone     left       Past Surgical History:   Procedure Laterality Date      CHOLECYSTECTOMY       [place] CYSTO/URETERO W/LITHOTRIPSY &INDWELL STENT INSRT Right 9/29/2017    Procedure: CYSTOSCOPY, RIGHT URETEROSCOPY LASER LITHOTRIPSY STENT INSERTION;  Surgeon: Dominguez Stnoer MD;  Location: [facility];  Service: Urology       Current Outpatient Prescriptions   Medication Sig Dispense Refill     furosemide (LASIX) 20 MG tablet Take 1 tablet (20 mg total) by mouth 2 (two) times a day as needed (take 20 mg twice daily if weigh gain more jenn 2-3 lbs in a week until weight is back to normal). 30 tablet 0     losartan (COZAAR) 25 MG tablet Take 25 mg by mouth.       UNABLE TO FIND Med Name:  Cholesterol med       lisinopril (PRINIVIL,ZESTRIL) 10 MG tablet Take 1 tablet (10 mg total) by mouth daily. 30 tablet 0     No current facility-administered medications for this visit.        Allergies   Allergen Reactions     Adhesive Tape-Silicones Rash     Tele patches         Social History     Social History     Marital status: Legally      Spouse name: N/A     Number of children: N/A     Years of education: N/A     Occupational History     Not on file.     Social History Main Topics     Smoking status: Never Smoker     Smokeless tobacco: Never Used     Alcohol use No     Drug use: No     Sexual activity: Not on file     Other Topics Concern     Not on file     Social History Narrative       Family History   Problem Relation Age of Onset     Asthma Father      Objective:      Physical Exam  Vitals:    12/14/17 1319   BP: 150/79   Pulse: 72   Temp: 98.7  F (37.1  C)     General - well developed, well nourished, appropriate for age. Appears no distress at this time  Abdomen - mildly obese soft, non-tender, no hepatosplenomegaly, no masses.   - no flank tenderness, no suprapubic tenderness, kidney and bladder non-palpable  MSK - normal spinal curvature. no spinal tenderness. normal gait. muscular strength intact.  Psych - oriented to time, place, and person, normal mood and  affect.        Labs   Urinalysis POC (Office):  Nitrite, UA   Date Value Ref Range Status   12/14/2017 Negative Negative Final   11/13/2017 Negative Negative Final   10/10/2017 Negative Negative Final       Lab Urinalysis:  Blood, UA   Date Value Ref Range Status   12/14/2017 Negative Negative Final   11/13/2017 Moderate (!) Negative Final   10/10/2017 Large (!) Negative Final     Nitrite, UA   Date Value Ref Range Status   12/14/2017 Negative Negative Final   11/13/2017 Negative Negative Final   10/10/2017 Negative Negative Final     Leukocytes, UA   Date Value Ref Range Status   12/14/2017 Negative Negative Final   11/13/2017 Negative Negative Final   10/10/2017 Small (!) Negative Final     pH, UA   Date Value Ref Range Status   12/14/2017 5.5 5.0 - 8.0 Final   11/13/2017 6.0 5.0 - 8.0 Final   10/10/2017 5.5 5.0 - 8.0 Final    and Stone prevention labs   Serum chemistries   Creatinine   Date Value Ref Range Status   11/13/2017 0.74 0.60 - 1.10 mg/dL Final   09/26/2017 0.80 0.60 - 1.10 mg/dL Final   08/30/2017 0.83 0.60 - 1.10 mg/dL Final     Potassium   Date Value Ref Range Status   11/13/2017 3.9 3.5 - 5.0 mmol/L Final   09/26/2017 4.0 3.5 - 5.0 mmol/L Final   08/30/2017 3.6 3.5 - 5.0 mmol/L Final     Calcium   Date Value Ref Range Status   11/13/2017 8.6 8.5 - 10.5 mg/dL Final   09/26/2017 9.1 8.5 - 10.5 mg/dL Final   08/30/2017 8.9 8.5 - 10.5 mg/dL Final     Phosphorus   Date Value Ref Range Status   11/13/2017 2.7 2.5 - 4.5 mg/dL Final     Uric Acid   Date Value Ref Range Status   11/13/2017 7.5 2.0 - 7.5 mg/dL Final    and 24 hour urine   Calcium, 24H Urine   Date Value Ref Range Status   11/22/2017 122 20 - 275 mg/24hr Final     Comment:       Hypercalciuria >350  Values are for persons with  average daily calcium intake  (600-800 mg/day)   11/15/2017 194 20 - 275 mg/24hr Final     Comment:       Hypercalciuria >350  Values are for persons with  average daily calcium intake  (600-800 mg/day)     Sodium, 24  Hour Urine   Date Value Ref Range Status   11/22/2017 64 40 - 217 mmol/24hr Final   11/15/2017 104 40 - 217 mmol/24hr Final     Citrate, 24 Hour Urine   Date Value Ref Range Status   11/22/2017 282 (L) 434 - 1191 mg/24hr Final     Comment:       Reference Ranges are not  established for 0-19 years  or >60 years of age.   11/15/2017 262 (L) 434 - 1191 mg/24hr Final     Comment:       Reference Ranges are not  established for 0-19 years  or >60 years of age.     Oxalate, 24 Hour Urine   Date Value Ref Range Status   11/22/2017 17.9 7.0 - 44.0 mg/24hr Final   11/15/2017 31.2 7.0 - 44.0 mg/24hr Final     pH, Urine   Date Value Ref Range Status   11/22/2017 5.5 4.5 - 8.0 Final   11/15/2017 5.5 4.5 - 8.0 Final     Urine Volume   Date Value Ref Range Status   11/22/2017 1100 mL Final   11/15/2017 1350 mL Final

## 2021-06-14 NOTE — PROGRESS NOTES
Assessment/Plan:        Diagnoses and all orders for this visit:    Calculus of ureter  -     Renal Function Profile; Future; Expected date: 11/13/17  -     Magnesium; Future; Expected date: 11/13/17  -     Uric Acid; Future; Expected date: 11/13/17  -     Stone Formation, 24 Hour Urine x2; Standing  -     Patient Stated Goal: Pass my stone  -     Patient Stated Goal: Prevent further stones  -     24 Hour Urine Collection Steps Education  -     CT Abdomen Pelvis Without Oral Without IV Contrast; Future; Expected date: 12/13/17    Urinary tract stones  -     Urinalysis Macroscopic    Urinary calculus    Other orders  -     UNABLE TO FIND; Med Name:  Cholesterol med      Stone Management Plan  [facility] 10/10/2017 11/13/2017   Urinary Tract Infection No suspicion of infection No suspicion of infection   Renal Colic Well controlled symptoms Asymptomatic at this time   Renal Failure No suspicion of renal failure No suspicion of renal failure   Current CT date - 11/13/2017   Right sided stones? - Yes   R Number of ureteral stones - 1   R GSD of ureteral stones - 2   R Location of ureteral stone - Proximal   R Stone Event Established event Established event   R Post-op status Stent Removal <2 mm   R Current Plan - MET   Left sided stones? - No   L Stone Event No current event No current event             Subjective:      HPI  Ms. Amalia Harris is a 67 y.o. Hmong female returning to the [facility] [place] for late postoperative follow-up.     She returns status post Right ureteroscopic laser lithotripsy for renal and ureteral stone. She has had no unanticipated post-operative events.     She is asymptomatic at present. She denies symptoms of fever, chills, flank pain, nausea, vomiting, urinary frequency and dysuria.    New CT scan was personally reviewed and demonstrates largest residual fragment is 2 mm in the right proximal ureter with no hydronephrosis.     Stone composition was 100 % uric acid.      She is at risk for ongoing active stone disease and will initiate stone risk evaluation. Serum stone risk chemistries were drawn today. Two 24 hour urine collections and dietary journal will be obtained at earliest [facility]..    Made arrangements to follow up on small ureteral stone in one month with CT.     ROS   Review of systems is negative except for HPI.    Past Medical History:   Diagnosis Date     CHF (congestive heart failure)      Hydronephrosis     with renal colic, urinary obstruction     Hypercholesteremia      Kidney stone     left       Past Surgical History:   Procedure Laterality Date     CHOLECYSTECTOMY       UT CYSTO/URETERO W/LITHOTRIPSY &INDWELL STENT INSRT Right 9/29/2017    Procedure: CYSTOSCOPY, RIGHT URETEROSCOPY LASER LITHOTRIPSY STENT INSERTION;  Surgeon: Dominguez Stoner MD;  Location: [facility];  Service: Urology       Current Outpatient Prescriptions   Medication Sig Dispense Refill     UNABLE TO FIND Med Name:  Cholesterol med       furosemide (LASIX) 20 MG tablet Take 1 tablet (20 mg total) by mouth 2 (two) times a day as needed (take 20 mg twice daily if weigh gain more jenn 2-3 lbs in a week until weight is back to normal). 30 tablet 0     lisinopril (PRINIVIL,ZESTRIL) 10 MG tablet Take 1 tablet (10 mg total) by mouth daily. 30 tablet 0     No current facility-administered medications for this visit.        Allergies   Allergen Reactions     Adhesive Tape-Silicones Rash     Tele patches         Social History     Social History     Marital status: Legally      Spouse name: N/A     Number of children: N/A     Years of education: N/A     Occupational History     Not on file.     Social History Main Topics     Smoking status: Never Smoker     Smokeless tobacco: Never Used     Alcohol use No     Drug use: No     Sexual activity: Not on file     Other Topics Concern     Not on file     Social History Narrative       Family History   Problem Relation Age of Onset      Asthma Father      Objective:      Physical Exam  Vitals:    11/13/17 1323   BP: (!) 199/93   Pulse: 74   Temp: 98.1  F (36.7  C)     General - well developed, well nourished, appropriate for age. Appears no distress at this time  Abdomen - moderately obese soft, non-tender, no hepatosplenomegaly, no masses.   - no flank tenderness, no suprapubic tenderness, kidney and bladder non-palpable  MSK - normal spinal curvature. no spinal tenderness. normal gait. muscular strength intact.  Psych - oriented to time, place, and person, normal mood and affect.        Labs   Urinalysis POC (Office):  Nitrite, UA   Date Value Ref Range Status   11/13/2017 Negative Negative Final   10/10/2017 Negative Negative Final   10/05/2017 Negative Negative Final       Lab Urinalysis:  Blood, UA   Date Value Ref Range Status   11/13/2017 Moderate (!) Negative Final   10/10/2017 Large (!) Negative Final   10/05/2017 Large (!) Negative Final     Nitrite, UA   Date Value Ref Range Status   11/13/2017 Negative Negative Final   10/10/2017 Negative Negative Final   10/05/2017 Negative Negative Final     Leukocytes, UA   Date Value Ref Range Status   11/13/2017 Negative Negative Final   10/10/2017 Small (!) Negative Final   10/05/2017 Moderate (!) Negative Final     pH, UA   Date Value Ref Range Status   11/13/2017 6.0 5.0 - 8.0 Final   10/10/2017 5.5 5.0 - 8.0 Final   10/05/2017 5.5 5.0 - 8.0 Final

## 2021-07-28 DIAGNOSIS — R82.991 HYPOCITRATURIA: ICD-10-CM

## 2021-07-28 NOTE — TELEPHONE ENCOUNTER
Routing refill request to provider for review/approval because:  Drug not on the [facility] refill protocol     Hilary GRAVESN, RN

## 2021-08-02 ENCOUNTER — OFFICE VISIT (OUTPATIENT)
Dept: FAMILY MEDICINE | Facility: CLINIC | Age: 71
End: 2021-08-02
Payer: MEDICARE

## 2021-08-02 VITALS
OXYGEN SATURATION: 99 % | HEIGHT: 58 IN | SYSTOLIC BLOOD PRESSURE: 132 MMHG | WEIGHT: 146 LBS | HEART RATE: 87 BPM | TEMPERATURE: 97.8 F | BODY MASS INDEX: 30.64 KG/M2 | DIASTOLIC BLOOD PRESSURE: 85 MMHG

## 2021-08-02 DIAGNOSIS — I11.0 BENIGN HYPERTENSIVE HEART DISEASE WITH CONGESTIVE HEART FAILURE (H): ICD-10-CM

## 2021-08-02 DIAGNOSIS — R80.9 TYPE 2 DIABETES MELLITUS WITH MICROALBUMINURIA, WITHOUT LONG-TERM CURRENT USE OF INSULIN (H): Primary | ICD-10-CM

## 2021-08-02 DIAGNOSIS — E11.29 TYPE 2 DIABETES MELLITUS WITH MICROALBUMINURIA, WITHOUT LONG-TERM CURRENT USE OF INSULIN (H): Primary | ICD-10-CM

## 2021-08-02 DIAGNOSIS — R82.991 HYPOCITRATURIA: ICD-10-CM

## 2021-08-02 DIAGNOSIS — E78.5 HYPERLIPIDEMIA LDL GOAL <100: ICD-10-CM

## 2021-08-02 PROBLEM — Z86.16 HISTORY OF COVID-19: Status: ACTIVE | Noted: 2021-08-02

## 2021-08-02 PROBLEM — Z86.0101 HISTORY OF ADENOMATOUS POLYP OF COLON: Status: ACTIVE | Noted: 2021-08-02

## 2021-08-02 LAB
ALT SERPL W P-5'-P-CCNC: 27 U/L (ref 0–50)
CHOLEST SERPL-MCNC: 244 MG/DL
CREAT SERPL-MCNC: 1 MG/DL (ref 0.52–1.04)
FASTING STATUS PATIENT QL REPORTED: YES
GFR SERPL CREATININE-BSD FRML MDRD: 58 ML/MIN/1.73M2
GLUCOSE BLD-MCNC: 137 MG/DL (ref 79–116)
HBA1C MFR BLD: 6.4 % (ref 0–5.6)
HDLC SERPL-MCNC: 69 MG/DL
LDLC SERPL CALC-MCNC: 144 MG/DL
NONHDLC SERPL-MCNC: 175 MG/DL
POTASSIUM BLD-SCNC: 4.1 MMOL/L (ref 3.4–5.3)
TRIGL SERPL-MCNC: 156 MG/DL

## 2021-08-02 PROCEDURE — 84132 ASSAY OF SERUM POTASSIUM: CPT | Performed by: FAMILY MEDICINE

## 2021-08-02 PROCEDURE — 82947 ASSAY GLUCOSE BLOOD QUANT: CPT | Performed by: FAMILY MEDICINE

## 2021-08-02 PROCEDURE — 80061 LIPID PANEL: CPT | Performed by: FAMILY MEDICINE

## 2021-08-02 PROCEDURE — 82565 ASSAY OF CREATININE: CPT | Performed by: FAMILY MEDICINE

## 2021-08-02 PROCEDURE — 82043 UR ALBUMIN QUANTITATIVE: CPT | Performed by: FAMILY MEDICINE

## 2021-08-02 PROCEDURE — 84460 ALANINE AMINO (ALT) (SGPT): CPT | Performed by: FAMILY MEDICINE

## 2021-08-02 PROCEDURE — 83036 HEMOGLOBIN GLYCOSYLATED A1C: CPT | Performed by: FAMILY MEDICINE

## 2021-08-02 PROCEDURE — 36415 COLL VENOUS BLD VENIPUNCTURE: CPT | Performed by: FAMILY MEDICINE

## 2021-08-02 PROCEDURE — 99214 OFFICE O/P EST MOD 30 MIN: CPT | Performed by: FAMILY MEDICINE

## 2021-08-02 RX ORDER — DEXTROMETHORPHAN HBR. AND GUAIFENESIN 10; 100 MG/5ML; MG/5ML
5-10 SOLUTION ORAL
COMMUNITY
Start: 2020-09-27 | End: 2021-08-02

## 2021-08-02 RX ORDER — LOSARTAN POTASSIUM 25 MG/1
25 TABLET ORAL DAILY
Qty: 90 TABLET | Refills: 1 | Status: SHIPPED | OUTPATIENT
Start: 2021-08-02 | End: 2022-07-08

## 2021-08-02 RX ORDER — POTASSIUM CITRATE 10 MEQ/1
30 TABLET, EXTENDED RELEASE ORAL 2 TIMES DAILY
Qty: 540 TABLET | Refills: 3 | Status: SHIPPED | OUTPATIENT
Start: 2021-08-02 | End: 2022-11-02

## 2021-08-02 RX ORDER — LANCETS 23 GAUGE
1 EACH MISCELLANEOUS
COMMUNITY
Start: 2020-09-27 | End: 2024-05-01

## 2021-08-02 RX ORDER — LOVASTATIN 40 MG
40 TABLET ORAL EVERY EVENING
Qty: 90 TABLET | Refills: 1 | Status: SHIPPED | OUTPATIENT
Start: 2021-08-02 | End: 2022-07-08

## 2021-08-02 RX ORDER — POTASSIUM CITRATE 10 MEQ/1
30 TABLET, EXTENDED RELEASE ORAL
COMMUNITY
End: 2021-08-02

## 2021-08-02 RX ORDER — LANCETS
EACH MISCELLANEOUS
COMMUNITY
Start: 2020-11-30

## 2021-08-02 RX ORDER — METFORMIN HCL 500 MG
1500 TABLET, EXTENDED RELEASE 24 HR ORAL
Qty: 270 TABLET | Refills: 1 | Status: SHIPPED | OUTPATIENT
Start: 2021-08-02 | End: 2022-02-09

## 2021-08-02 ASSESSMENT — MIFFLIN-ST. JEOR: SCORE: 1077

## 2021-08-02 ASSESSMENT — PAIN SCALES - GENERAL: PAINLEVEL: MILD PAIN (2)

## 2021-08-02 NOTE — PATIENT INSTRUCTIONS
At [facility], we strive to deliver an exceptional experience to you, every time we see you. If you receive a survey, please complete it as we do value your feedback.  If you have MyChart, you can expect to receive results automatically within 24 hours of their completion.  Your provider will send a note interpreting your results as well.   If you do not have MyChart, you should receive your results in about a week by mail.    Your care team:                            Family Medicine Internal Medicine   MD Gonzalo Palomino MD Shantel Branch-Fleming, MD Srinivasa Vaka, MD Katya Belousova, PAMERI Pettit, APRN CNP    Presley Perrin, MD Pediatrics   Isaiah Garcia, PAMERI George, CNP MD Chelsea Diaz APRN CNP   MD Kiersten Michele MD Deborah Mielke, MD Cata Ayala, APRN [facility]      Clinic hours: Monday - Thursday 7 am-6 pm; Fridays 7 am-5 pm.   Urgent care: Monday - Friday 10 am- 8 pm; Saturday and Sunday 9 am-5 pm.    Clinic: [phone]       [facility]: Monday - Thursday 8 am - 7 pm; Friday 8 am - 6 pm  [facility]: [phone]     Use [email or web address] for 24/7 diagnosis and treatment of dozens of conditions.      Please call [facility]: [phone] ([place] or [place]) or [facility]: [phone] ([facility]) to schedule your DEXA (bone density scan).

## 2021-08-02 NOTE — PROGRESS NOTES
Assessment & Plan     (E11.29,  R80.9) Type 2 diabetes mellitus with microalbuminuria, without long-term current use of insulin (H)  (primary encounter diagnosis)  Comment: Well-controlled  Plan: Hemoglobin A1c, glucose metFORMIN (GLUCOPHAGE-XR) 500 MG 24 hr tablet         Return in about 6 months (around 2/2/2022) for Medicare annual wellness exam, diabetes.      (I11.0) Benign hypertensive heart disease with congestive heart failure (H)  Comment: Well-controlled  Plan: losartan (COZAAR) 25 MG tablet        Follow-up in 6 months    (E78.5) Hyperlipidemia LDL goal <100  Comment:   Plan: Lipid panel with reflex, ALT lovastatin (MEVACOR) 40 MG tablet        Recheck lipids in 6 to 12 months    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  Comment: Patient notes that if she is not taking her medication, there can develop an irritation with urination  Plan: Potassium, creatinine, potassium citrate prescription available to her              Return in about 6 months (around 2/2/2022) for Medicare annual wellness exam, diabetes.    Terry Sanchez MD  [facility]    Angel Holland is a 71 year old who presents for the following health issues     HPI     Diabetes Follow-up    How often are you checking your blood sugar? One time daily  What time of day are you checking your blood sugars (select all that apply)?  After meals, mostly ranging 115-120  Have you had any blood sugars above 200?  No  Have you had any blood sugars below 70?  No    What symptoms do you notice when your blood sugar is low?  None and Not applicable    What concerns do you have today about your diabetes? None     Do you have any of these symptoms? (Select all that apply)  Weight loss, but this was intentional, made significant diet changes since last visit (eliminated most meat, elilminated most fried foods, eating more vegetables)    Have you had a diabetic eye exam in the last 12 months? No        BP Readings from Last 2 Encounters:  "  08/02/21 132/85   02/10/21 118/79     Hemoglobin A1C (%)   Date Value   08/02/2021 6.4 (H)   11/30/2020 6.4 (H)   03/09/2020 7.4 (H)     LDL Cholesterol Calculated (mg/dL)   Date Value   11/30/2020 57   03/09/2020 72           How many servings of fruits and vegetables do you eat daily?  4 or more    On average, how many sweetened beverages do you drink each day (Examples: soda, juice, sweet tea, etc.  Do NOT count diet or artificially sweetened beverages)?   0    How many days per week do you exercise enough to make your heart beat faster? 6    How many minutes a day do you exercise enough to make your heart beat faster? 30 - 60    How many days per week do you miss taking your medication? 0      Review of Systems   Constitutional, HEENT, cardiovascular, pulmonary, gi and gu systems are negative, except as otherwise noted.      Objective    /85 (BP Location: Left arm, Patient Position: Sitting, Cuff Size: Adult Regular)   Pulse 87   Temp 97.8  F (36.6  C) (Tympanic)   Ht 1.473 m (4' 10\")   Wt 66.2 kg (146 lb)   SpO2 99%   BMI 30.51 kg/m    Body mass index is 30.51 kg/m .  Physical Exam   GENERAL: healthy, alert and no distress  EYES: Eyes grossly normal to inspection, PERRL, EOMI, sclerae white and conjunctivae normal  MS: no gross musculoskeletal defects noted, no edema  SKIN: no suspicious lesions or rashes to visible skin  NEURO: Normal strength and tone, sensory exam grossly normal, mentation intact, oriented times 3 and cranial nerves 2-12 intact  PSYCH: mentation appears normal, affect normal/bright     Lab Results   Component Value Date    A1C 6.4 08/02/2021    A1C 6.4 11/30/2020    A1C 7.4 03/09/2020    A1C 6.3 09/05/2019    A1C 6.8 12/17/2018    A1C 6.4 05/25/2016                  "

## 2021-08-03 LAB
CREAT UR-MCNC: 66 MG/DL
MICROALBUMIN UR-MCNC: <5 MG/L
MICROALBUMIN/CREAT UR: NORMAL MG/G{CREAT}

## 2021-08-04 ENCOUNTER — TELEPHONE (OUTPATIENT)
Dept: FAMILY MEDICINE | Facility: CLINIC | Age: 71
End: 2021-08-04

## 2021-08-04 DIAGNOSIS — Z78.0 POSTMENOPAUSAL ESTROGEN DEFICIENCY: Primary | ICD-10-CM

## 2021-08-04 DIAGNOSIS — E28.39 OTHER PRIMARY OVARIAN FAILURE: ICD-10-CM

## 2021-08-04 NOTE — TELEPHONE ENCOUNTER
----- Message from Kristine Paris sent at 2021  9:31 AM CDT -----  Regarding: Dx order  Good morning Dr. Sanchez-    Wondering if you are able to place a more up to date order for a bone density scan? It looks like the patient scheduled a Dexa off an order that was placed back in 2018 which should have  already.     Below is a list of medically necessary Dx codes per the Medicare coverage policy for your reference:    E21.0 Primary hyperparathyroidism  E21.3 Hyperparathyroidism, unspecified  E23.0 Hypopituitarism  E24.0 Pituitary-dependent Cushing's disease  E24.2 Drug-induced Cushing's syndrome  E24.3 Ectopic ACTH syndrome  E24.4 Alcohol-induced pseudo-Cushing's syndrome  E24.8 Other Cushing's syndrome  E24.9 Cushing's syndrome, unspecified  E28.310 Symptomatic premature menopause  E28.319 Asymptomatic premature menopause  E28.39 Other primary ovarian failure  E34.2 Ectopic hormone secretion, not elsewhere classified  E89.40 Asymptomatic postprocedural ovarian failure  E89.41 Symptomatic postprocedural ovarian failure  M84.68xA Pathological fracture in other disease, other site, initial encounter for fracture  M85.811 Other specified disorders of bone density and structure, right shoulder  M85.812 Other specified disorders of bone density and structure, left shoulder  M85.821 Other specified disorders of bone density and structure, right upper arm   M85.822 Other specified disorders of bone density and structure, left upper arm  M85.831 Other specified disorders of bone density and structure, right forearm  M85.832 Other specified disorders of bone density and structure, left forearm  M85.841 Other specified disorders of bone density and structure, right hand  M85.842 Other specified disorders of bone density and structure, left hand  M85.851 Other specified disorders of bone density and structure, right thigh  M85.852 Other specified disorders of bone density and structure, left thigh  M85.861 Other specified  disorders of bone density and structure, right lower leg   M85.862 Other specified disorders of bone density and structure, left lower leg  M85.871 Other specified disorders of bone density and structure, right ankle and foot  M85.872 Other specified disorders of bone density and structure, left ankle and foot  M85.88 Other specified disorders of bone density and structure, other site  M85.89 Other specified disorders of bone density and structure, multiple sites  N95.8 Other specified menopausal and perimenopausal disorders  N95.9 Unspecified menopausal and perimenopausal disorder  Q78.0 Osteogenesis imperfecta  Q96.0 Karyotype 45, X  Q96.1 Karyotype 46, X iso (Xq)  Q96.2 Karyotype 46, X with abnormal sex chromosome, except iso (Xq)  Q96.3 Mosaicism, 45, X/46, XX or XY  Q96.4 Mosaicism, 45, X/other cell line(s) with abnormal sex chromosome  Q96.8 Other variants of Anna's syndrome  S12.110A Anterior displaced Type II dens fracture, initial encounter for closed fracture  S12.110B Anterior displaced Type II dens fracture, initial encounter for open fracture  S12.111A Posterior displaced Type II dens fracture, initial encounter for closed fracture  S12.111B Posterior displaced Type II dens fracture, initial encounter for open fracture  S12.112A Nondisplaced Type II dens fracture, initial encounter for closed fracture  S12.112B Nondisplaced Type II dens fracture, initial encounter for open fracture   S12.120A Other displaced dens fracture, initial encounter for closed fracture  S12.120B Other displaced dens fracture, initial encounter for open fracture  S12.121A Other nondisplaced dens fracture, initial encounter for closed fracture   S12.121B Other nondisplaced dens fracture, initial encounter for open fracture  Z79.3 Long term (current) use of hormonal contraceptives  Z79.51 Long term (current) use of inhaled steroids  Z79.52 Long term (current) use of systemic steroids  Z79.811 Long-term (current) use of aramatose  inhibitors  Z79.83 Long term (current) use of bisphosphonates  Z87.310 Personal history of (healed) osteoporosis fracture        Thank you for your attention on this,    Kristine Paris  Senior Intake Financial Counselor  [facility]  [address] [address] [place], [place] [address]   [place] [address]  Ph: [phone]  Fax: [phone]

## 2021-08-09 ENCOUNTER — ANCILLARY PROCEDURE (OUTPATIENT)
Dept: BONE DENSITY | Facility: CLINIC | Age: 71
End: 2021-08-09
Attending: NURSE PRACTITIONER
Payer: MEDICARE

## 2021-08-09 DIAGNOSIS — Z78.0 ASYMPTOMATIC POSTMENOPAUSAL STATUS: ICD-10-CM

## 2021-08-09 DIAGNOSIS — E28.39 OTHER PRIMARY OVARIAN FAILURE: ICD-10-CM

## 2021-08-09 DIAGNOSIS — Z78.0 POSTMENOPAUSAL ESTROGEN DEFICIENCY: ICD-10-CM

## 2021-08-09 PROCEDURE — 77080 DXA BONE DENSITY AXIAL: CPT | Performed by: RADIOLOGY

## 2021-09-30 ENCOUNTER — OFFICE VISIT (OUTPATIENT)
Dept: OPTOMETRY | Facility: CLINIC | Age: 71
End: 2021-09-30
Payer: MEDICARE

## 2021-09-30 DIAGNOSIS — H02.834 DERMATOCHALASIS OF UPPER AND LOWER EYELIDS OF BOTH EYES: ICD-10-CM

## 2021-09-30 DIAGNOSIS — H02.835 DERMATOCHALASIS OF UPPER AND LOWER EYELIDS OF BOTH EYES: ICD-10-CM

## 2021-09-30 DIAGNOSIS — Z96.1 PSEUDOPHAKIA OF BOTH EYES: ICD-10-CM

## 2021-09-30 DIAGNOSIS — H02.831 DERMATOCHALASIS OF UPPER AND LOWER EYELIDS OF BOTH EYES: ICD-10-CM

## 2021-09-30 DIAGNOSIS — H02.832 DERMATOCHALASIS OF UPPER AND LOWER EYELIDS OF BOTH EYES: ICD-10-CM

## 2021-09-30 DIAGNOSIS — H52.4 PRESBYOPIA: ICD-10-CM

## 2021-09-30 DIAGNOSIS — H52.222 REGULAR ASTIGMATISM OF LEFT EYE: ICD-10-CM

## 2021-09-30 DIAGNOSIS — H02.889 MEIBOMIAN GLAND DYSFUNCTION: ICD-10-CM

## 2021-09-30 DIAGNOSIS — H04.123 DRY EYE SYNDROME OF BOTH EYES: ICD-10-CM

## 2021-09-30 DIAGNOSIS — E11.29 TYPE 2 DIABETES MELLITUS WITH MICROALBUMINURIA, WITHOUT LONG-TERM CURRENT USE OF INSULIN (H): Primary | ICD-10-CM

## 2021-09-30 DIAGNOSIS — R80.9 TYPE 2 DIABETES MELLITUS WITH MICROALBUMINURIA, WITHOUT LONG-TERM CURRENT USE OF INSULIN (H): Primary | ICD-10-CM

## 2021-09-30 PROCEDURE — 92015 DETERMINE REFRACTIVE STATE: CPT | Mod: GY | Performed by: OPTOMETRIST

## 2021-09-30 PROCEDURE — 92014 COMPRE OPH EXAM EST PT 1/>: CPT | Performed by: OPTOMETRIST

## 2021-09-30 ASSESSMENT — KERATOMETRY
OD_AXISANGLE2_DEGREES: 106
OS_K1POWER_DIOPTERS: 44.00
OD_K2POWER_DIOPTERS: 44.25
OD_K1POWER_DIOPTERS: 43.75
OS_AXISANGLE2_DEGREES: 72
OS_K2POWER_DIOPTERS: 45.25

## 2021-09-30 ASSESSMENT — REFRACTION_MANIFEST
OD_AXIS: 045
OD_SPHERE: PLANO
OS_AXIS: 162
OD_CYLINDER: +0.25
OS_ADD: +2.50
OS_CYLINDER: +0.75
OD_ADD: +2.50
OS_SPHERE: -0.50

## 2021-09-30 ASSESSMENT — CONF VISUAL FIELD
OD_NORMAL: 1
OS_NORMAL: 1

## 2021-09-30 ASSESSMENT — CUP TO DISC RATIO
OD_RATIO: 0.3
OS_RATIO: 0.4

## 2021-09-30 ASSESSMENT — REFRACTION_WEARINGRX
OD_ADD: +3.00
OD_CYLINDER: +0.25
OS_ADD: +3.00
OS_CYLINDER: SPHERE
OD_AXIS: 010
OS_SPHERE: PLANO
OD_SPHERE: +0.50

## 2021-09-30 ASSESSMENT — TONOMETRY
OS_IOP_MMHG: 21
IOP_METHOD: APPLANATION
OD_IOP_MMHG: 20

## 2021-09-30 ASSESSMENT — VISUAL ACUITY
METHOD: SNELLEN - LINEAR
OS_SC+: -1
OD_SC: 20/20
OS_SC: 20/25
OD_SC: 20/60
OS_SC: 20/60

## 2021-09-30 ASSESSMENT — EXTERNAL EXAM - RIGHT EYE: OD_EXAM: NORMAL

## 2021-09-30 ASSESSMENT — EXTERNAL EXAM - LEFT EYE: OS_EXAM: NORMAL

## 2021-09-30 NOTE — LETTER
9/30/2021         RE: Amalia Harris  [address]  [place] [address]        Dear Colleague,    Thank you for referring your patient, Amalia Harris, to the [facility]. Please see a copy of my visit note below.    Chief Complaint   Patient presents with     Diabetic Eye Exam        Chief Complaint(s) and History of Present Illness(es)     Diabetic Eye Exam     Diabetes Type: Type 2 and taking oral medications    Duration: 1 year    Blood Sugars: is controlled               Hemoglobin A1C   Date Value Ref Range Status   08/02/2021 6.4 (H) 0.0 - 5.6 % Final     Comment:     Normal <5.7%   Prediabetes 5.7-6.4%    Diabetes 6.5% or higher     Note: Adopted from ADA consensus guidelines.   11/30/2020 6.4 (H) 0 - 5.6 % Final     Comment:     Normal <5.7% Prediabetes 5.7-6.4%  Diabetes 6.5% or higher - adopted from ADA   consensus guidelines.     03/09/2020 7.4 (H) 0 - 5.6 % Final     Comment:     Normal <5.7% Prediabetes 5.7-6.4%  Diabetes 6.5% or higher - adopted from ADA   consensus guidelines.     09/05/2019 6.3 (H) 0 - 5.6 % Final     Comment:     Normal <5.7% Prediabetes 5.7-6.4%  Diabetes 6.5% or higher - adopted from ADA   consensus guidelines.              Last Eye Exam: 2020  Dilated Previously: yes, side effects of dilation explained today    What are you currently using to see?  does not use glasses or contacts    Distance Vision Acuity: Satisfied with vision    Near Vision Acuity: Not satisfied     Eye Comfort: sticky feeling  Do you use eye drops? : Yes: Patanol  Occupation or Hobbies: Retired    Mariam Kee - Optometric Assistant     Medical, surgical and family histories reviewed and updated 9/30/2021.       OBJECTIVE: See Ophthalmology exam    ASSESSMENT:    ICD-10-CM    1. Type 2 diabetes mellitus with microalbuminuria, without long-term current use of insulin (H)  E11.29 EYE EXAM (SIMPLE-NONBILLABLE)    R80.9    2. Pseudophakia of both eyes  Z96.1 EYE EXAM  (SIMPLE-NONBILLABLE)   3. Meibomian gland dysfunction  H02.889 EYE EXAM (SIMPLE-NONBILLABLE)   4. Dry eye syndrome of both eyes  H04.123 EYE EXAM (SIMPLE-NONBILLABLE)   5. 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 Presbyopia  H52.4 REFRACTION   7. Regular astigmatism of left eye  H52.222 REFRACTION      PLAN:    Amalia Harris aware  eye exam results will be sent to Terry Sanchez  Patient Instructions   There are not any signs of the diabetes affecting the eyes today.  It is important that you get your eyes dilated once yearly and keep good control of your diabetes.    Heat to the eyes daily for 10-15 minutes nightly with warm washcloth or reusable gel masks from the pharmacy or  Oceanea heat masks can be purchased at Amazon.    Ocusoft lid scrubs at night.     Retaine (or other artificial tear)-1 drop both eyes 2-4 x day.    Referral to Dr. Timothy Saunders at the [facility] for lid evaluation- [phone].    Eyeglass prescription given.  Optional glasses as needed.    Return in 1 year for a complete eye exam or sooner if needed.    Don Ortiz, ROSSY                      Again, thank you for allowing me to participate in the care of your patient.        Sincerely,        Dno Ortiz, OD

## 2021-09-30 NOTE — PROGRESS NOTES
Chief Complaint   Patient presents with     Diabetic Eye Exam        Chief Complaint(s) and History of Present Illness(es)     Diabetic Eye Exam     Diabetes Type: Type 2 and taking oral medications    Duration: 1 year    Blood Sugars: is controlled               Hemoglobin A1C   Date Value Ref Range Status   08/02/2021 6.4 (H) 0.0 - 5.6 % Final     Comment:     Normal <5.7%   Prediabetes 5.7-6.4%    Diabetes 6.5% or higher     Note: Adopted from ADA consensus guidelines.   11/30/2020 6.4 (H) 0 - 5.6 % Final     Comment:     Normal <5.7% Prediabetes 5.7-6.4%  Diabetes 6.5% or higher - adopted from ADA   consensus guidelines.     03/09/2020 7.4 (H) 0 - 5.6 % Final     Comment:     Normal <5.7% Prediabetes 5.7-6.4%  Diabetes 6.5% or higher - adopted from ADA   consensus guidelines.     09/05/2019 6.3 (H) 0 - 5.6 % Final     Comment:     Normal <5.7% Prediabetes 5.7-6.4%  Diabetes 6.5% or higher - adopted from ADA   consensus guidelines.              Last Eye Exam: 2020  Dilated Previously: yes, side effects of dilation explained today    What are you currently using to see?  does not use glasses or contacts    Distance Vision Acuity: Satisfied with vision    Near Vision Acuity: Not satisfied     Eye Comfort: sticky feeling  Do you use eye drops? : Yes: Patanol  Occupation or Hobbies: Retired    Mariam Kee - Optometric Assistant     Medical, surgical and family histories reviewed and updated 9/30/2021.       OBJECTIVE: See Ophthalmology exam    ASSESSMENT:    ICD-10-CM    1. Type 2 diabetes mellitus with microalbuminuria, without long-term current use of insulin (H)  E11.29 EYE EXAM (SIMPLE-NONBILLABLE)    R80.9    2. Pseudophakia of both eyes  Z96.1 EYE EXAM (SIMPLE-NONBILLABLE)   3. Meibomian gland dysfunction  H02.889 EYE EXAM (SIMPLE-NONBILLABLE)   4. Dry eye syndrome of both eyes  H04.123 EYE EXAM (SIMPLE-NONBILLABLE)   5. 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 Presbyopia  H52.4 REFRACTION   7. Regular astigmatism of left eye  H52.222 REFRACTION      PLAN:    Amalia lucero  eye exam results will be sent to Terry Sanchez  Patient Instructions   There are not any signs of the diabetes affecting the eyes today.  It is important that you get your eyes dilated once yearly and keep good control of your diabetes.    Heat to the eyes daily for 10-15 minutes nightly with warm washcloth or reusable gel masks from the pharmacy or  backstitch heat masks can be purchased at Amazon.    Ocusoft lid scrubs at night.     Retaine (or other artificial tear)-1 drop both eyes 2-4 x day.    Referral to Dr. Timothy Saunders at the [facility] for lid evaluation- [phone].    Eyeglass prescription given.  Optional glasses as needed.    Return in 1 year for a complete eye exam or sooner if needed.    Don Ortiz, OD

## 2021-09-30 NOTE — PATIENT INSTRUCTIONS
There are not any signs of the diabetes affecting the eyes today.  It is important that you get your eyes dilated once yearly and keep good control of your diabetes.    Heat to the eyes daily for 10-15 minutes nightly with warm washcloth or reusable gel masks from the pharmacy or  Tesaris heat masks can be purchased at Amazon.    Ocusoft lid scrubs at night.     Retaine (or other artificial tear)-1 drop both eyes 2-4 x day.    Referral to Dr. Timothy Saunders at the [facility] for lid evaluation- [phone].    Eyeglass prescription given.  Optional glasses as needed.    Return in 1 year for a complete eye exam or sooner if needed.    Don Ortiz, OD    The affects of the dilating drops last for 4- 6 hours.  You will be more sensitive to light and vision will be blurry up close.  Do not drive if you do not feel comfortable.  Mydriatic sunglasses were given if needed.    There is a combination of three treatments which can greatly improve symptoms of dry eyes.    1.  Artificial tears  2. Heat (eyes closed)  3. Eyelid and eyelash cleansing (eyes closed)     Use one drop of artificial tears both eyes 4 x daily.  Once in the morning, lunch, dinner and bedtime. Continue to use the drops regardless if your eyes are comfortable or not.  Artificial tears work best as a preventative and not as well after your eyes are starting to bother you.  It may take 4- 6 weeks of using the drops before you notice improvement.  If after that time you are still having problems schedule an appointment for an evaluation and discussion of different treatments such as Restasis or Xiidra.  Dry eyes are a chronic condition and you may have more symptoms at certain times of the year.    Excess tearing can be due to the right tears not working properly or a blockage in the tear drainage system.  You can try using artificial tears 1 drop both eyes 4 x day.  If the excess tearing is bothersome after 4-6 weeks of treatment then we can send you  for further testing.  This would entail a referral to our oculoplastic specialist Dr. Timothy Saunders at the [facility]-[phone].    Recommended brands are:    Systane Complete  Systane Ultra  Systane Balance  Refresh Advanced Optive  Refresh Relieva  Blink    Recommended brands for contact lens wearers are:    Systane contacts  Refresh contacts  Blink contacts    If you are using drops more than 4 x day or have sensitivities to preservatives I recommend non preserved artificial tears.  These come in 1 use vials.  They can be used every 1-2 hours.  Do not reuse the vials.    Recommended brands are:    Refresh Optive Seth-3  Systane- preservative free  Refresh-  preservative free  Blink- preservative free    Gels or ointment can be used at night.    Recommended brands are:    Systane Gel  Refresh Gel  Blink Gel  Genteal Gel    Systane night time (ointment)  Refresh Celluvisc  Refresh PM (ointment)      Visine, Clear Eyes or Murine (drops that get the red out) can irritate the eyes and cause a rebound effect where the eyes become more red and you end up using more drops.  Avoid drops containing tetrahydrozoline, naphazoline, phenylephrine, oxymetazoline.      OTC Lumify is a newer product that gives immediate redness relief without the rebound effect.  Use as needed to take the redness out.    Artificial tears may be used with other drops (such as allergy, glaucoma, antibiotics) around the same time.  Be sure to wait 5 minutes in between drops.    Heat to the eyelids can also improve your symptoms of dry eyes.  Reginald heat masks can be purchased at Amazon to be used nightly for 10-15 minutes.  Other options are gel masks that can be put in the microwave and purchased at most pharmacies.      Tea Tree Oil eyelid cleansers recommended are Ocusoft Oust foam cleanser to cleanse eyelids/lashes at night and in the am. Other options are Blephadex or Cliradex eyelid wipes.  KEEP EYES CLOSED when using these  products.  These can be purchased on amazon.com   A good product for make up remover with tea tree oil is WeLDishOpinionEyes.  This can be found at www.[employer] or [employer].    Other good eyelid cleansers have hypochlorous acid which removes excess bacteria and is safe around the eyes. Products are Avenova, Ocusoft Hypochlor or Heyedrate. Spray solution onto cotton pad, close eyes and gently apply to eyelids and eyelashes using side to side motion.  You can also KEEP EYES CLOSED spray and rub into eyelashes.  You do not need to rinse it off. Use morning and evening. These products can be found on Amazon.  You can check with your local pharmacy and see if they can order if for you if they don't have it.    Other brands of eyelid cleansing wipes are:    Ocusoft wipes  Systane wipes    A great eye make up line is [email or web address]        Optometry Providers       Clinic Locations                                 Telephone Number   Dr. Jackie Villegas    [place]   [facility] [phone]     [facility] Hours:                [place] [facility] Hours:       [facility] Hours:   [address]   [address]     [address] [place] [address]   [place] [address]    [place] [address]  Phone: [phone]                    Phone: [phone]     Phone: [phone]                      Monday 8:00-7:00                          Monday 8:00-7:00                          Monday 8:00-7:00              Tuesday 8:00-6:00                          Tuesday 8:00-7:00                          Tuesday 8:00-7:00              Wednesday 8:00-6:00                  Wednesday 8:00-7:00                   Wednesday 8:00-7:00      Thursday 8:00-6:00                        Thursday 8:00-7:00                         Thursday 8:00-7:00            Friday 8:00-5:00                              Friday 8:00-5:00                               Friday 8:00-5:00    [facility] Hours:   [address] [place] [address]  [phone]    Monday 8:00-7:00  Tuesday 8:00-7:00  Wednesday 8:00-7:00  Thursday 8:00-7:00  Friday 8:00-5:00  Please log on to [email or web address] to order your contact lenses.  The link is found on the [facility] page.  As always, Thank you for trusting us with your health care needs!

## 2021-10-02 ENCOUNTER — HEALTH MAINTENANCE LETTER (OUTPATIENT)
Age: 71
End: 2021-10-02

## 2022-01-22 ENCOUNTER — HEALTH MAINTENANCE LETTER (OUTPATIENT)
Age: 72
End: 2022-01-22

## 2022-02-07 DIAGNOSIS — R80.9 TYPE 2 DIABETES MELLITUS WITH MICROALBUMINURIA, WITHOUT LONG-TERM CURRENT USE OF INSULIN (H): ICD-10-CM

## 2022-02-07 DIAGNOSIS — E11.29 TYPE 2 DIABETES MELLITUS WITH MICROALBUMINURIA, WITHOUT LONG-TERM CURRENT USE OF INSULIN (H): ICD-10-CM

## 2022-02-07 NOTE — LETTER
February 18, 2022          Amalia Harris  [address] [address] [address]            Dear Amalia Harris,      At [facility] we care about your health and are committed to providing quality patient care. Regular appointments are a vital part of the care and management of your health and can help prevent many of the complications that can occur.      It has come to our attention that you are due for a medication check.  Please call [facility] at [phone] soon to schedule your follow up appointment.    If you have transferred care to another clinic please call to inform us so that we do not continue to send you reminder letters.      Sincerely,      [facility] Care Team

## 2022-02-07 NOTE — TELEPHONE ENCOUNTER
.Reason for Call:  Medication or medication refill:    Do you use a [facility]?  Name of the pharmacy and phone number for the current request:  Pharmacy: [id number] [address] AT [address] [address] [facility]  [address]      Name of the medication requested: med refills including MetFORMIN    Other request: patient would like a call back once meds are sent to [facility]     Can we leave a detailed message on this number? YES    Phone number patient can be reached at: Cell number on file:    Telephone Information:   Mobile [phone]       Best Time: any    Call taken on 2/7/2022 at 12:49 PM by Juan A Cassidy    PT came into the clinic to request a refill on medications. She needs them ASAP

## 2022-02-07 NOTE — TELEPHONE ENCOUNTER
"Requested Prescriptions   Pending Prescriptions Disp Refills    metFORMIN (GLUCOPHAGE-XR) 500 MG 24 hr tablet 270 tablet 1     Sig: Take 3 tablets (1,500 mg) by mouth daily (with dinner) for diabetes.        Biguanide Agents Failed - 2/7/2022  1:09 PM        Failed - Patient has documented A1c within the specified period of time.     If HgbA1C is 8 or greater, it needs to be on file within the past 3 months.  If less than 8, must be on file within the past 6 months.     Recent Labs   Lab Test 08/02/21  1444   A1C 6.4*             Failed - Recent (6 mo) or future (30 days) visit within the authorizing provider's specialty     Patient had office visit in the last 6 months or has a visit in the next 30 days with authorizing provider or within the authorizing provider's specialty.  See \"Patient Info\" tab in inbasket, or \"Choose Columns\" in Meds & Orders section of the refill encounter.            Passed - Patient is age 10 or older        Passed - Patient's CR is NOT>1.4 OR Patient's EGFR is NOT<45 within past 12 mos.       Recent Labs   Lab Test 08/02/21  1444 11/30/20  1239   GFRESTIMATED 58* 63   GFRESTBLACK  --  73       Recent Labs   Lab Test 08/02/21  1444   CR 1.00             Passed - Patient does NOT have a diagnosis of CHF.        Passed - Medication is active on med list        Passed - Patient is not pregnant        Passed - Patient has not had a positive pregnancy test within the past 12 mos.                 "

## 2022-02-09 RX ORDER — METFORMIN HCL 500 MG
1500 TABLET, EXTENDED RELEASE 24 HR ORAL
Qty: 270 TABLET | Refills: 0 | Status: SHIPPED | OUTPATIENT
Start: 2022-02-09 | End: 2022-08-08

## 2022-03-19 ENCOUNTER — HEALTH MAINTENANCE LETTER (OUTPATIENT)
Age: 72
End: 2022-03-19

## 2022-05-11 DIAGNOSIS — R80.9 TYPE 2 DIABETES MELLITUS WITH MICROALBUMINURIA, WITHOUT LONG-TERM CURRENT USE OF INSULIN (H): ICD-10-CM

## 2022-05-11 DIAGNOSIS — E11.29 TYPE 2 DIABETES MELLITUS WITH MICROALBUMINURIA, WITHOUT LONG-TERM CURRENT USE OF INSULIN (H): ICD-10-CM

## 2022-05-13 NOTE — TELEPHONE ENCOUNTER
"Requested Prescriptions   Pending Prescriptions Disp Refills    metFORMIN (GLUCOPHAGE XR) 500 MG 24 hr tablet [Pharmacy Med Name: METFORMIN ER 500MG 24HR TABS] 270 tablet 0     Sig: TAKE 3 TABLETS(1500 MG) BY MOUTH DAILY WITH DINNER FOR DIABETES        Biguanide Agents Failed - 5/11/2022  3:32 AM        Failed - Patient has documented A1c within the specified period of time.     If HgbA1C is 8 or greater, it needs to be on file within the past 3 months.  If less than 8, must be on file within the past 6 months.     Recent Labs   Lab Test 08/02/21  1444   A1C 6.4*             Failed - Recent (6 mo) or future (30 days) visit within the authorizing provider's specialty     Patient had office visit in the last 6 months or has a visit in the next 30 days with authorizing provider or within the authorizing provider's specialty.  See \"Patient Info\" tab in inbasket, or \"Choose Columns\" in Meds & Orders section of the refill encounter.            Passed - Patient is age 10 or older        Passed - Patient's CR is NOT>1.4 OR Patient's EGFR is NOT<45 within past 12 mos.       Recent Labs   Lab Test 08/02/21  1444 11/30/20  1239   GFRESTIMATED 58* 63   GFRESTBLACK  --  73       Recent Labs   Lab Test 08/02/21  1444   CR 1.00             Passed - Patient does NOT have a diagnosis of CHF.        Passed - Medication is active on med list        Passed - Patient is not pregnant        Passed - Patient has not had a positive pregnancy test within the past 12 mos.               "

## 2022-05-16 NOTE — LETTER
October 27, 2020      Amalia Harris  [address] [address] [address]        Dear ,    At [facility] we care about your health and are committed to providing quality patient care. Regular appointments are a vital part of the care and management of your health and can help prevent many of the complications that can occur.       It has come to our attention that you have been given a refill of lovastatin (MEVACOR) and potassium citrate (UROCIT-K) and that you are due for an annual visit and diabetes check with your provider around 12/15/20 .  Please call [facility] at [phone] or use [employer] to schedule your appointment.       Sincerely,   [facility] Care Team                  [NL] : normotonic [Erythematous] : erythematous [Pustules] : pustules [Face] : face

## 2022-05-31 RX ORDER — METFORMIN HCL 500 MG
TABLET, EXTENDED RELEASE 24 HR ORAL
Qty: 270 TABLET | Refills: 0 | OUTPATIENT
Start: 2022-05-31

## 2022-07-14 ENCOUNTER — VIRTUAL VISIT (OUTPATIENT)
Dept: FAMILY MEDICINE | Facility: CLINIC | Age: 72
End: 2022-07-14
Payer: MEDICARE

## 2022-07-14 DIAGNOSIS — Z00.00 ENCOUNTER FOR MEDICARE ANNUAL WELLNESS EXAM: Primary | ICD-10-CM

## 2022-07-14 PROCEDURE — 99207 PR NO CHARGE LOS: CPT | Performed by: FAMILY MEDICINE

## 2022-07-14 ASSESSMENT — PATIENT HEALTH QUESTIONNAIRE - PHQ9
SUM OF ALL RESPONSES TO PHQ QUESTIONS 1-9: 0
10. IF YOU CHECKED OFF ANY PROBLEMS, HOW DIFFICULT HAVE THESE PROBLEMS MADE IT FOR YOU TO DO YOUR WORK, TAKE CARE OF THINGS AT HOME, OR GET ALONG WITH OTHER PEOPLE: NOT DIFFICULT AT ALL
SUM OF ALL RESPONSES TO PHQ QUESTIONS 1-9: 0

## 2022-07-14 NOTE — PROGRESS NOTES
"Amalia is a 72 year old who is being evaluated via a billable video visit.      How would you like to obtain your AVS? Mail a copy  If the video visit is dropped, the invitation should be resent by: Send to e-mail at: [email or web address]  Will anyone else be joining your video visit? Yes: Daughter in law . How would they like to receive their invitation? Other e-mail: in person     Assessment & Plan     (Z00.00) Encounter for Medicare annual wellness exam  (primary encounter diagnosis)  Comment: The AWV was performed over the telephone prior to the beginning of the attempted video visit.  Plan: The patient has been instructed to reschedule (voicemail)      Terry Sanchez MD  [facility]    Angel Holland is a 72 year old accompanied by her Daughter-in-law, presenting for the following health issues:  Eye Problem (\"Seeing stars\" ) and Physical (AWV)      History of Present Illness       Reason for visit:  AWV and eye problem    She eats 2-3 servings of fruits and vegetables daily.She consumes 3 sweetened beverage(s) daily.She exercises with enough effort to increase her heart rate 30 to 60 minutes per day.  She exercises with enough effort to increase her heart rate 7 days per week.   She is taking medications regularly.    Today's PHQ-9         PHQ-9 Total Score: 0    PHQ-9 Q9 Thoughts of better off dead/self-harm past 2 weeks :   Not at all    How difficult have these problems made it for you to do your work, take care of things at home, or get along with other people: Not difficult at all       Annual Wellness Visit    Patient has been advised of split billing requirements and indicates understanding: Yes     Are you in the first 12 months of your Medicare Part B coverage?  No    Physical Health:    In general, how would you rate your overall physical health? good    Outside of work, how many days during the week do you exercise?6-7 days/week    Outside of work, approximately how many " "minutes a day do you exercise?45-60 minutes    If you drink alcohol do you typically have >3 drinks per day or >7 drinks per week? Not Applicable    Do you usually eat at least 4 servings of fruit and vegetables a day, include whole grains & fiber and avoid regularly eating high fat or \"junk\" foods? Yes    Do you have any problems taking medications regularly? No    Do you have any side effects from medications? none    Needs assistance for the following daily activities: no assistance needed    Which of the following safety concerns are present in your home?  none identified     Hearing impairment: No    In the past 6 months, have you been bothered by leaking of urine? no    There were no vitals taken for this visit.  Weight: Unable to obtain due to video visit  Height: Unable to obtain due to video visit  BMI: Unable to obtain due to video visit  Blood Pressure: Unable to obtain due to video visit    Mental Health:    In general, how would you rate your overall mental or emotional health? good  PHQ-2 Score:      Do you feel safe in your environment? Yes    Have you ever done Advance Care Planning? (For example, a Health Directive, POLST, or a discussion with a medical provider or your loved ones about your wishes)? No, advance care planning information given to patient to review.  Patient declined advance care planning discussion at this time.    Fall risk:  Unable to complete due to virtual visit; need for additional assessment in future face-to-face visit    Cognitive Screening: Unable to complete due to virtual visit; need for additional assessment in future face-to-face visit    Do you have sleep apnea, excessive snoring or daytime drowsiness?: no    Current providers sharing in care for this patient include:   Patient Care Team:  Terry Sanchez MD as PCP - General (Family Practice)  Terry Sanchez MD as Assigned PCP  Don Ortiz OD as Assigned Surgical Provider    Patient has been advised of split " billing requirements and indicates understanding: Yes  Eye(s) Problem  Onset/Duration: 1 week   Description:   Location: No  Pain: No  Redness: No  Accompanying Signs & Symptoms:  Discharge/mattering: No  Swelling: No  Visual changes: YES  Fever: No  Nasal Congestion: No  Bothered by bright lights: No  History:  Trauma: No  Foreign body exposure: No  Wearing contacts: No  Precipitating or alleviating factors: None  Therapies tried and outcome: None      Review of Systems         Objective    Vitals - Patient Reported  Pain Score: No Pain (0)      Vitals:  No vitals were obtained today due to virtual visit.    Physical Exam       Office Visit on 08/02/2021   Component Date Value Ref Range Status     Creatinine Urine mg/dL 08/02/2021 66  mg/dL Final     Albumin Urine mg/L 08/02/2021 <5  mg/L Final     Albumin Urine mg/g Cr 08/02/2021    Final    Unable to calculate:  Urine creatinine or albumin value below detectable level     Hemoglobin A1C 08/02/2021 6.4 (A) 0.0 - 5.6 % Final    Normal <5.7%   Prediabetes 5.7-6.4%    Diabetes 6.5% or higher     Note: Adopted from ADA consensus guidelines.     Glucose Whole Blood 08/02/2021 137 (A) 79 - 116 mg/dL Final     Potassium 08/02/2021 4.1  3.4 - 5.3 mmol/L Final     Creatinine 08/02/2021 1.00  0.52 - 1.04 mg/dL Final     GFR Estimate 08/02/2021 58 (A) >60 mL/min/1.73m2 Final    As of July 11, 2021, eGFR is calculated by the CKD-EPI creatinine equation, without race adjustment. eGFR can be influenced by muscle mass, exercise, and diet. The reported eGFR is an estimation only and is only applicable if the renal function is stable.     Cholesterol 08/02/2021 244 (A) <200 mg/dL Final    Age 0-19 years  Desirable: <170 mg/dL  Borderline high:  170-199 mg/dl  High:            >199 mg/dl    Age 20 years and older  Desirable: <200 mg/dL     Triglycerides 08/02/2021 156 (A) <150 mg/dL Final    0-9 years:  Normal:    Less than 75 mg/dL  Borderline high:  75-99 mg/dL  High:              Greater than or equal to 100 mg/dL    0-19 years:  Normal:    Less than 90 mg/dL  Borderline high:   mg/dL  High:             Greater than or equal to 130 mg/dL    20 years and older:  Normal:    Less than 150 mg/dL  Borderline high:  150-199 mg/dL  High:             200-499 mg/dL  Very high:   Greater than or equal to 500 mg/dL     Direct Measure HDL 08/02/2021 69  >=50 mg/dL Final    0-19 years:       Greater than or equal to 45 mg/dL   Low: Less than 40 mg/dL   Borderline low: 40-44 mg/dL     20 years and older:   Female: Greater than or equal to 50 mg/dL   Male:   Greater than or equal to 40 mg/dL          LDL Cholesterol Calculated 08/02/2021 144 (A) <=100 mg/dL Final    Age 0-19 years:  Desirable: 0-110 mg/dL   Borderline high: 110-129 mg/dL   High: >= 130 mg/dL    Age 20 years and older:  Desirable: <100mg/dL  Above desirable: 100-129 mg/dL   Borderline high: 130-159 mg/dL   High: 160-189 mg/dL   Very high: >= 190 mg/dL     Non HDL Cholesterol 08/02/2021 175 (A) <130 mg/dL Final    0-19 years:  Desirable:          Less than 120 mg/dL  Borderline high:   120-144 mg/dL  High:                   Greater than or equal to 145 mg/dL    20 years and older:  Desirable:          130 mg/dL  Above Desirable: 130-159 mg/dL  Borderline high:   160-189 mg/dL  High:               190-219 mg/dL  Very high:     Greater than or equal to 220 mg/dL     Patient Fasting > 8hrs? 08/02/2021 Yes   Final     ALT 08/02/2021 27  0 - 50 U/L Final     Lab Results   Component Value Date    A1C 6.4 08/02/2021    A1C 6.4 11/30/2020    A1C 7.4 03/09/2020    A1C 6.3 09/05/2019    A1C 6.8 12/17/2018    A1C 6.4 05/25/2016                  Video-Visit Details    Video Start Time: 9:02 AM    Type of service:  Video Visit    Video End Time:9:18 AM    Originating Location (pt. Location):  Unknown.  The patient did not connect for the video visit despite the email link sent.  I tried calling the patient through our , but there was no  answer.    Distant Location (provider location):  [facility]     Platform used for Video Visit: Tamar Cuenca.

## 2022-07-21 ENCOUNTER — VIRTUAL VISIT (OUTPATIENT)
Dept: FAMILY MEDICINE | Facility: CLINIC | Age: 72
End: 2022-07-21
Payer: MEDICARE

## 2022-07-21 DIAGNOSIS — I11.0 BENIGN HYPERTENSIVE HEART DISEASE WITH CONGESTIVE HEART FAILURE (H): ICD-10-CM

## 2022-07-21 DIAGNOSIS — H53.9 VISUAL DISTURBANCE: Primary | ICD-10-CM

## 2022-07-21 DIAGNOSIS — E11.29 TYPE 2 DIABETES MELLITUS WITH MICROALBUMINURIA, WITHOUT LONG-TERM CURRENT USE OF INSULIN (H): ICD-10-CM

## 2022-07-21 DIAGNOSIS — R80.9 TYPE 2 DIABETES MELLITUS WITH MICROALBUMINURIA, WITHOUT LONG-TERM CURRENT USE OF INSULIN (H): ICD-10-CM

## 2022-07-21 PROCEDURE — 99214 OFFICE O/P EST MOD 30 MIN: CPT | Mod: 95 | Performed by: FAMILY MEDICINE

## 2022-07-21 NOTE — PROGRESS NOTES
Mailing AVS to patient's address listed in the chart. This will go out in tomorrow's mail.  [facility]  2nd Floor  Primary Care

## 2022-07-21 NOTE — PROGRESS NOTES
Amalia is a 72 year old who is being evaluated via a billable telephone visit.      What phone number would you like to be contacted at? [phone]  How would you like to obtain your AVS? MyChart and Mail a copy    Assessment & Plan     (H53.9) Visual disturbance  (primary encounter diagnosis)  Comment: The differential diagnosis for her vision symptoms is rather broad and requires an ophthalmologic exam to sort out. I doubt that any of it is a medication effect.  Plan: Adult Eye Referral            (E11.29,  R80.9) Type 2 diabetes mellitus with microalbuminuria, without long-term current use of insulin (H)  Comment: pt is overdue for diabetes check. Pre-visit labs ordered.  Plan: HEMOGLOBIN A1C, ALT, Lipid panel reflex to         direct LDL Non-fasting, Albumin Random Urine         Quantitative with Creat Ratio        Return for diabetes in the near future.  Can schedule separate lab appointment beforehand if she would like.    (I11.0) Benign hypertensive heart disease with congestive heart failure (H)  Comment: likewise  Plan: BASIC METABOLIC PANEL, CBC with Platelets                *35 minutes spent on the date of the encounter doing chart review, patient visit and documentation          Terry Sanchez MD  [facility]    Angel Holland is a 72 year old, presenting for the following health issues:  Eye Problem  She is accompanied by her daughter-in-lawwho interprets for the patient.     HPI     Eye(s) Problem  Onset/Duration: 2-3 weeks  Description:   Location: No  Pain: No  Redness: No  Accompanying Signs & Symptoms:  Discharge/mattering: No  Swelling: No  Visual changes: YES- seeing spots and black spots on eye  Fever: No  Nasal Congestion: No  Bothered by bright lights: YES, that is when she sees the black dots  History:  Trauma: YES, had eye surgery  Foreign body exposure: No  Wearing contacts: No  Precipitating or alleviating factors: None  Therapies tried and outcome:  None        Review of Systems         Objective           Vitals:  No vitals were obtained today due to virtual visit.    Physical Exam   healthy, alert and no distress  PSYCH: Alert and oriented times 3; coherent speech, normal   rate and volume, able to articulate logical thoughts, able   to abstract reason, no tangential thoughts, no hallucinations   or delusions  Her affect is normal  RESP: No cough, no audible wheezing, able to talk in full sentences  Remainder of exam unable to be completed due to telephone visits                Phone call duration: 12 minutes    .  ..

## 2022-07-25 ENCOUNTER — LAB (OUTPATIENT)
Dept: LAB | Facility: CLINIC | Age: 72
End: 2022-07-25
Payer: MEDICARE

## 2022-07-25 DIAGNOSIS — I11.0 BENIGN HYPERTENSIVE HEART DISEASE WITH CONGESTIVE HEART FAILURE (H): ICD-10-CM

## 2022-07-25 DIAGNOSIS — E11.29 TYPE 2 DIABETES MELLITUS WITH MICROALBUMINURIA, WITHOUT LONG-TERM CURRENT USE OF INSULIN (H): ICD-10-CM

## 2022-07-25 DIAGNOSIS — R80.9 TYPE 2 DIABETES MELLITUS WITH MICROALBUMINURIA, WITHOUT LONG-TERM CURRENT USE OF INSULIN (H): ICD-10-CM

## 2022-07-25 LAB
ALT SERPL W P-5'-P-CCNC: 20 U/L (ref 0–50)
ANION GAP SERPL CALCULATED.3IONS-SCNC: 4 MMOL/L (ref 3–14)
BUN SERPL-MCNC: 39 MG/DL (ref 7–30)
CALCIUM SERPL-MCNC: 8.6 MG/DL (ref 8.5–10.1)
CHLORIDE BLD-SCNC: 106 MMOL/L (ref 94–109)
CHOLEST SERPL-MCNC: 143 MG/DL
CO2 SERPL-SCNC: 30 MMOL/L (ref 20–32)
CREAT SERPL-MCNC: 0.9 MG/DL (ref 0.52–1.04)
CREAT UR-MCNC: 58 MG/DL
ERYTHROCYTE [DISTWIDTH] IN BLOOD BY AUTOMATED COUNT: 13.8 % (ref 10–15)
FASTING STATUS PATIENT QL REPORTED: NO
GFR SERPL CREATININE-BSD FRML MDRD: 68 ML/MIN/1.73M2
GLUCOSE BLD-MCNC: 103 MG/DL (ref 70–99)
HBA1C MFR BLD: 6.1 % (ref 0–5.6)
HCT VFR BLD AUTO: 35.2 % (ref 35–47)
HDLC SERPL-MCNC: 63 MG/DL
HGB BLD-MCNC: 11.1 G/DL (ref 11.7–15.7)
LDLC SERPL CALC-MCNC: 56 MG/DL
MCH RBC QN AUTO: 29.6 PG (ref 26.5–33)
MCHC RBC AUTO-ENTMCNC: 31.5 G/DL (ref 31.5–36.5)
MCV RBC AUTO: 94 FL (ref 78–100)
MICROALBUMIN UR-MCNC: 5 MG/L
MICROALBUMIN/CREAT UR: 8.62 MG/G CR (ref 0–25)
NONHDLC SERPL-MCNC: 80 MG/DL
PLATELET # BLD AUTO: 239 10E3/UL (ref 150–450)
POTASSIUM BLD-SCNC: 4.5 MMOL/L (ref 3.4–5.3)
RBC # BLD AUTO: 3.75 10E6/UL (ref 3.8–5.2)
SODIUM SERPL-SCNC: 140 MMOL/L (ref 133–144)
TRIGL SERPL-MCNC: 119 MG/DL
WBC # BLD AUTO: 9.1 10E3/UL (ref 4–11)

## 2022-07-25 PROCEDURE — 36415 COLL VENOUS BLD VENIPUNCTURE: CPT

## 2022-07-25 PROCEDURE — 85027 COMPLETE CBC AUTOMATED: CPT

## 2022-07-25 PROCEDURE — 83036 HEMOGLOBIN GLYCOSYLATED A1C: CPT

## 2022-07-25 PROCEDURE — 82043 UR ALBUMIN QUANTITATIVE: CPT

## 2022-07-25 PROCEDURE — 84460 ALANINE AMINO (ALT) (SGPT): CPT

## 2022-07-25 PROCEDURE — 80048 BASIC METABOLIC PNL TOTAL CA: CPT

## 2022-07-25 PROCEDURE — 80061 LIPID PANEL: CPT

## 2022-07-27 ENCOUNTER — OFFICE VISIT (OUTPATIENT)
Dept: OPHTHALMOLOGY | Facility: CLINIC | Age: 72
End: 2022-07-27
Attending: FAMILY MEDICINE
Payer: MEDICARE

## 2022-07-27 DIAGNOSIS — R80.9 TYPE 2 DIABETES MELLITUS WITH MICROALBUMINURIA, WITHOUT LONG-TERM CURRENT USE OF INSULIN (H): ICD-10-CM

## 2022-07-27 DIAGNOSIS — Z96.1 PSEUDOPHAKIA: ICD-10-CM

## 2022-07-27 DIAGNOSIS — H53.9 VISUAL DISTURBANCE: ICD-10-CM

## 2022-07-27 DIAGNOSIS — E11.29 TYPE 2 DIABETES MELLITUS WITH MICROALBUMINURIA, WITHOUT LONG-TERM CURRENT USE OF INSULIN (H): ICD-10-CM

## 2022-07-27 DIAGNOSIS — H43.813 POSTERIOR VITREOUS DETACHMENT OF BOTH EYES: Primary | ICD-10-CM

## 2022-07-27 PROCEDURE — 92014 COMPRE OPH EXAM EST PT 1/>: CPT | Performed by: OPHTHALMOLOGY

## 2022-07-27 ASSESSMENT — VISUAL ACUITY
OD_SC: 20/40
OS_SC: 20/30
OS_SC+: -1
METHOD: SNELLEN - LINEAR

## 2022-07-27 ASSESSMENT — TONOMETRY
OS_IOP_MMHG: 19
OD_IOP_MMHG: 18
IOP_METHOD: APPLANATION

## 2022-07-27 ASSESSMENT — EXTERNAL EXAM - LEFT EYE: OS_EXAM: NORMAL

## 2022-07-27 ASSESSMENT — CUP TO DISC RATIO
OD_RATIO: 0.3
OS_RATIO: 0.4

## 2022-07-27 ASSESSMENT — SLIT LAMP EXAM - LIDS
COMMENTS: 2+ DERMATOCHALASIS
COMMENTS: 2+ DERMATOCHALASIS

## 2022-07-27 ASSESSMENT — EXTERNAL EXAM - RIGHT EYE: OD_EXAM: NORMAL

## 2022-07-27 NOTE — PROGRESS NOTES
Current Eye Medications:  None    Subjective:  Here for evaluation of floaters and flashes of lights on both eyes.  She started having floaters and flashes about 1 month ago, in both eyes.   Last time she noticed a flash was about 2 weeks ago.   She notes blurriness in both eyes.   No pain.    Son with patient as .    Kenya WISAM Brand  10:36 AM 07/27/2022    Objective:  See Ophthalmology Exam.      Assessment:  New posterior vitreous detachment both eyes in interval since last exam.  Otherwise stable eye exam.  No diabetic retinopathy.      ICD-10-CM    1. Posterior vitreous detachment of both eyes  H43.813    2. Type 2 diabetes mellitus with microalbuminuria, without long-term current use of insulin (H)  E11.29     R80.9    3. Visual disturbance  H53.9 Adult Eye Referral   4. Pseudophakia, ou  Z96.1        Plan:  Okay to use OTC +2.25 half readers.  Signs and symptoms of retinal detachment (shower of black floaters, frequent flashing even during day, curtain over part of visual field) discussed.  Possible clouding of posterior capsule both eyes discussed.   Flashes and floaters brochure given.  Call in March 2023 for an appointment in July 2023 for Complete Exam    Dr. Weinstein [phone]

## 2022-07-27 NOTE — LETTER
7/27/2022         RE: Amalia Harris  [address]  [place] [address]        Dear Colleague,    Thank you for referring your patient, Amalia Harris, to the [facility]. Please see a copy of my visit note below.    Current Eye Medications:  None    Subjective:  Here for evaluation of floaters and flashes of lights on both eyes.  She started having floaters and flashes about 1 month ago, in both eyes.   Last time she noticed a flash was about 2 weeks ago.   She notes blurriness in both eyes.   No pain.    Son with patient as .    Kenya Brand, WISAM  10:36 AM 07/27/2022    Objective:  See Ophthalmology Exam.      Assessment:  New posterior vitreous detachment both eyes in interval since last exam.  Otherwise stable eye exam.  No diabetic retinopathy.      ICD-10-CM    1. Posterior vitreous detachment of both eyes  H43.813    2. Type 2 diabetes mellitus with microalbuminuria, without long-term current use of insulin (H)  E11.29     R80.9    3. Visual disturbance  H53.9 Adult Eye Referral   4. Pseudophakia, ou  Z96.1        Plan:  Okay to use OTC +2.25 half readers.  Signs and symptoms of retinal detachment (shower of black floaters, frequent flashing even during day, curtain over part of visual field) discussed.  Possible clouding of posterior capsule both eyes discussed.   Flashes and floaters brochure given.  Call in March 2023 for an appointment in July 2023 for Complete Exam    Dr. Weinstein [phone]       Again, thank you for allowing me to participate in the care of your patient.        Sincerely,        Amol Weinstein MD

## 2022-07-27 NOTE — PATIENT INSTRUCTIONS
Okay to use OTC +2.25 half readers.  Signs and symptoms of retinal detachment (shower of black floaters, frequent flashing even during day, curtain over part of visual field) discussed.  Possible clouding of posterior capsule both eyes discussed.   Call in March 2023 for an appointment in July 2023 for Complete Exam    Dr. Weinstein [phone]     Xyua siv OTC +2.25 ib nrab nyeem.  Don yam tshwm sim thiab cov tsos mob ntawm retinal detachment (da dej dub floaters, nquag flashing txawm tias thaum nruab hnub, curtain hla ib feem ntawm visual teb) [place].  Tau huab posterior capsule ob lub qhov muag [place].   Hu ham[place] lub peb hlis ntuj 2023 [place] ib lub sij hawm teem tseg nyob [place] hauv lub Xya hli ntuj 2023 kom tiav    Dr. Weinstein [phone]

## 2022-09-03 ENCOUNTER — HEALTH MAINTENANCE LETTER (OUTPATIENT)
Age: 72
End: 2022-09-03

## 2022-10-28 DIAGNOSIS — R82.991 HYPOCITRATURIA: ICD-10-CM

## 2022-11-02 RX ORDER — POTASSIUM CITRATE 10 MEQ/1
30 TABLET, EXTENDED RELEASE ORAL 2 TIMES DAILY
Qty: 540 TABLET | Refills: 0 | Status: SHIPPED | OUTPATIENT
Start: 2022-11-02 | End: 2023-01-30

## 2022-11-23 ENCOUNTER — TELEPHONE (OUTPATIENT)
Dept: FAMILY MEDICINE | Facility: CLINIC | Age: 72
End: 2022-11-23

## 2022-11-23 NOTE — TELEPHONE ENCOUNTER
Patient Quality Outreach    Patient is due for the following:   Diabetes -  Diabetic Follow-Up Visit and Foot Exam    Next Steps:   Patient has upcoming appointment, these items will be addressed at that time. Has upcoming diabetic check on 1/30/2023.    Type of outreach:    Chart review performed, no outreach needed.    Next Steps:  Reach out within 90 days via Phone.    Max number of attempts reached: No. Will try again in 90 days if patient still on fail list.    Questions for provider review:    None     Yvonne Young

## 2023-01-30 ENCOUNTER — OFFICE VISIT (OUTPATIENT)
Dept: FAMILY MEDICINE | Facility: CLINIC | Age: 73
End: 2023-01-30
Payer: MEDICARE

## 2023-01-30 VITALS
HEIGHT: 58 IN | OXYGEN SATURATION: 99 % | TEMPERATURE: 97.7 F | WEIGHT: 162 LBS | HEART RATE: 79 BPM | BODY MASS INDEX: 34 KG/M2 | DIASTOLIC BLOOD PRESSURE: 90 MMHG | RESPIRATION RATE: 16 BRPM | SYSTOLIC BLOOD PRESSURE: 152 MMHG

## 2023-01-30 DIAGNOSIS — R80.9 TYPE 2 DIABETES MELLITUS WITH MICROALBUMINURIA, WITHOUT LONG-TERM CURRENT USE OF INSULIN (H): Primary | ICD-10-CM

## 2023-01-30 DIAGNOSIS — E11.29 TYPE 2 DIABETES MELLITUS WITH MICROALBUMINURIA, WITHOUT LONG-TERM CURRENT USE OF INSULIN (H): ICD-10-CM

## 2023-01-30 DIAGNOSIS — Z23 NEED FOR VACCINATION: ICD-10-CM

## 2023-01-30 DIAGNOSIS — E11.29 TYPE 2 DIABETES MELLITUS WITH MICROALBUMINURIA, WITHOUT LONG-TERM CURRENT USE OF INSULIN (H): Primary | ICD-10-CM

## 2023-01-30 DIAGNOSIS — I11.0 BENIGN HYPERTENSIVE HEART DISEASE WITH CONGESTIVE HEART FAILURE (H): ICD-10-CM

## 2023-01-30 DIAGNOSIS — Z12.31 VISIT FOR SCREENING MAMMOGRAM: ICD-10-CM

## 2023-01-30 DIAGNOSIS — D64.9 LOW HEMOGLOBIN: ICD-10-CM

## 2023-01-30 DIAGNOSIS — R80.9 TYPE 2 DIABETES MELLITUS WITH MICROALBUMINURIA, WITHOUT LONG-TERM CURRENT USE OF INSULIN (H): ICD-10-CM

## 2023-01-30 DIAGNOSIS — E78.5 HYPERLIPIDEMIA LDL GOAL <100: ICD-10-CM

## 2023-01-30 DIAGNOSIS — R82.991 HYPOCITRATURIA: ICD-10-CM

## 2023-01-30 DIAGNOSIS — Z00.00 ENCOUNTER FOR MEDICARE ANNUAL WELLNESS EXAM: Primary | ICD-10-CM

## 2023-01-30 LAB
BASOPHILS # BLD AUTO: 0.1 10E3/UL (ref 0–0.2)
BASOPHILS NFR BLD AUTO: 1 %
EOSINOPHIL # BLD AUTO: 0.3 10E3/UL (ref 0–0.7)
EOSINOPHIL NFR BLD AUTO: 3 %
ERYTHROCYTE [DISTWIDTH] IN BLOOD BY AUTOMATED COUNT: 12.7 % (ref 10–15)
HBA1C MFR BLD: 6.6 % (ref 0–5.6)
HCT VFR BLD AUTO: 37.9 % (ref 35–47)
HGB BLD-MCNC: 12.2 G/DL (ref 11.7–15.7)
IMM GRANULOCYTES # BLD: 0 10E3/UL
IMM GRANULOCYTES NFR BLD: 0 %
LYMPHOCYTES # BLD AUTO: 1.3 10E3/UL (ref 0.8–5.3)
LYMPHOCYTES NFR BLD AUTO: 17 %
MCH RBC QN AUTO: 29.7 PG (ref 26.5–33)
MCHC RBC AUTO-ENTMCNC: 32.2 G/DL (ref 31.5–36.5)
MCV RBC AUTO: 92 FL (ref 78–100)
MONOCYTES # BLD AUTO: 0.6 10E3/UL (ref 0–1.3)
MONOCYTES NFR BLD AUTO: 8 %
NEUTROPHILS # BLD AUTO: 5.1 10E3/UL (ref 1.6–8.3)
NEUTROPHILS NFR BLD AUTO: 70 %
PLATELET # BLD AUTO: 261 10E3/UL (ref 150–450)
RBC # BLD AUTO: 4.11 10E6/UL (ref 3.8–5.2)
WBC # BLD AUTO: 7.3 10E3/UL (ref 4–11)

## 2023-01-30 PROCEDURE — 99214 OFFICE O/P EST MOD 30 MIN: CPT | Mod: 25 | Performed by: FAMILY MEDICINE

## 2023-01-30 PROCEDURE — 80061 LIPID PANEL: CPT | Performed by: FAMILY MEDICINE

## 2023-01-30 PROCEDURE — 0124A COVID-19 VACCINE BIVALENT BOOSTER 12+ (PFIZER): CPT | Performed by: FAMILY MEDICINE

## 2023-01-30 PROCEDURE — 90662 IIV NO PRSV INCREASED AG IM: CPT | Performed by: FAMILY MEDICINE

## 2023-01-30 PROCEDURE — 99000 SPECIMEN HANDLING OFFICE-LAB: CPT | Performed by: FAMILY MEDICINE

## 2023-01-30 PROCEDURE — 90715 TDAP VACCINE 7 YRS/> IM: CPT | Performed by: FAMILY MEDICINE

## 2023-01-30 PROCEDURE — 99207 PR FOOT EXAM NO CHARGE: CPT | Mod: 25 | Performed by: FAMILY MEDICINE

## 2023-01-30 PROCEDURE — 83036 HEMOGLOBIN GLYCOSYLATED A1C: CPT | Performed by: FAMILY MEDICINE

## 2023-01-30 PROCEDURE — 36415 COLL VENOUS BLD VENIPUNCTURE: CPT | Performed by: FAMILY MEDICINE

## 2023-01-30 PROCEDURE — G0439 PPPS, SUBSEQ VISIT: HCPCS | Performed by: FAMILY MEDICINE

## 2023-01-30 PROCEDURE — 84132 ASSAY OF SERUM POTASSIUM: CPT | Performed by: FAMILY MEDICINE

## 2023-01-30 PROCEDURE — G0008 ADMIN INFLUENZA VIRUS VAC: HCPCS | Mod: 59 | Performed by: FAMILY MEDICINE

## 2023-01-30 PROCEDURE — 90471 IMMUNIZATION ADMIN: CPT | Performed by: FAMILY MEDICINE

## 2023-01-30 PROCEDURE — 91312 COVID-19 VACCINE BIVALENT BOOSTER 12+ (PFIZER): CPT | Performed by: FAMILY MEDICINE

## 2023-01-30 PROCEDURE — 82565 ASSAY OF CREATININE: CPT | Performed by: FAMILY MEDICINE

## 2023-01-30 PROCEDURE — 84460 ALANINE AMINO (ALT) (SGPT): CPT | Performed by: FAMILY MEDICINE

## 2023-01-30 PROCEDURE — 85025 COMPLETE CBC W/AUTO DIFF WBC: CPT | Performed by: FAMILY MEDICINE

## 2023-01-30 PROCEDURE — 84238 ASSAY NONENDOCRINE RECEPTOR: CPT | Mod: 90 | Performed by: FAMILY MEDICINE

## 2023-01-30 RX ORDER — METFORMIN HCL 500 MG
2000 TABLET, EXTENDED RELEASE 24 HR ORAL
Qty: 360 TABLET | Refills: 1 | Status: SHIPPED | OUTPATIENT
Start: 2023-01-30 | End: 2023-07-31

## 2023-01-30 RX ORDER — POTASSIUM CITRATE 10 MEQ/1
30 TABLET, EXTENDED RELEASE ORAL 2 TIMES DAILY
Qty: 540 TABLET | Refills: 0 | Status: SHIPPED | OUTPATIENT
Start: 2023-01-30 | End: 2023-07-31

## 2023-01-30 RX ORDER — LOSARTAN POTASSIUM 50 MG/1
50 TABLET ORAL DAILY
Qty: 90 TABLET | Refills: 1 | Status: SHIPPED | OUTPATIENT
Start: 2023-01-30 | End: 2023-08-01

## 2023-01-30 RX ORDER — LOVASTATIN 40 MG
40 TABLET ORAL EVERY EVENING
Qty: 90 TABLET | Refills: 1 | Status: SHIPPED | OUTPATIENT
Start: 2023-01-30 | End: 2023-12-18

## 2023-01-30 ASSESSMENT — PAIN SCALES - GENERAL: PAINLEVEL: NO PAIN (0)

## 2023-01-30 ASSESSMENT — PATIENT HEALTH QUESTIONNAIRE - PHQ9: SUM OF ALL RESPONSES TO PHQ QUESTIONS 1-9: 3

## 2023-01-30 NOTE — PROGRESS NOTES
"  SUBJECTIVE:   Amalia Harris is a 72 year old female who presents for Preventive Visit. She is accompanied by her  (our telephone service).      PATIENT ROOMED WITH  ID# [id number]     Patient has been advised of split billing requirements and indicates understanding: Yes  Are you in the first 12 months of your Medicare Part B coverage?  No    Physical Health:    In general, how would you rate your overall physical health? good    Outside of work, how many days during the week do you exercise? 2-3 days/week    Outside of work, approximately how many minutes a day do you exercise?30-45 minutes    If you drink alcohol do you typically have >3 drinks per day or >7 drinks per week? No    Do you usually eat at least 4 servings of fruit and vegetables a day, include whole grains & fiber and avoid regularly eating high fat or \"junk\" foods? Yes    Do you have any problems taking medications regularly?  No    Do you have any side effects from medications? none    Needs assistance for the following daily activities: no assistance needed    Which of the following safety concerns are present in your home?  none identified     Hearing impairment: No    In the past 6 months, have you been bothered by leaking of urine? no    Mental Health:    In general, how would you rate your overall mental or emotional health? good  PHQ-2 Score:      Do you feel safe in your environment? YES    Have you ever done Advance Care Planning? (For example, a Health Directive, POLST, or a discussion with a medical provider or your loved ones about your wishes):     Additional concerns to address?  No    Fall risk:  Fallen 2 or more times in the past year?: No  Any fall with injury in the past year?: No    Cognitive Screenin) Repeat 3 items (Leader, Season, Table)  YES  2) Clock draw: NORMAL  3) 3 item recall: Recalls 2 objects   Results: NORMAL clock, 1-2 items recalled: COGNITIVE IMPAIRMENT LESS LIKELY    Mini-CogTM Copyright S " [place]. Licensed by the author for use in [facility]; reprinted with permission (maikel@[email or web address]). All rights reserved.      Do you have sleep apnea, excessive snoring or daytime drowsiness?: yes        Diabetes Follow-up    How often are you checking your blood sugar? Two times daily  Blood sugar testing frequency justification:  Uncontrolled diabetes  What time of day are you checking your blood sugars (select all that apply)?  After meals  Have you had any blood sugars above 200?  No  Have you had any blood sugars below 70?  No    What symptoms do you notice when your blood sugar is low?  None    What concerns do you have today about your diabetes?      Do you have any of these symptoms? (Select all that apply)  Blurry vision & feels like a needle           BP Readings from Last 2 Encounters:   01/30/23 (!) 152/90   08/02/21 132/85     Hemoglobin A1C (%)   Date Value   01/30/2023 6.6 (H)   07/25/2022 6.1 (H)   11/30/2020 6.4 (H)   03/09/2020 7.4 (H)     LDL Cholesterol Calculated (mg/dL)   Date Value   07/25/2022 56   08/02/2021 144 (H)   11/30/2020 57   03/09/2020 72                            Social History     Tobacco Use     Smoking status: Never     Smokeless tobacco: Never   Substance Use Topics     Alcohol use: No     Alcohol/week: 0.0 standard drinks                           Current providers sharing in care for this patient include:   Patient Care Team:  Terry Sanchez MD as PCP - General (Family Practice)  Terry Sanchez MD as Assigned PCP  Amol Weinstein MD as Assigned Surgical Provider    The following health maintenance items are reviewed in Epic and correct as of today:  Health Maintenance   Topic Date Due     HF ACTION PLAN  Never done     DIABETIC FOOT EXAM  Never done     ZOSTER IMMUNIZATION (1 of 2) Never done     MEDICARE ANNUAL WELLNESS VISIT  12/10/2019     COVID-19 Vaccine (3 - Booster for Pfizer series) 05/28/2021     DTAP/TDAP/TD IMMUNIZATION (2 - Td or Tdap)  "09/27/2021     INFLUENZA VACCINE (1) 09/01/2022     BMP  01/25/2023     MAMMO SCREENING  01/15/2023     ANNUAL REVIEW OF HM ORDERS  07/14/2023     ALT  07/25/2023     LIPID  07/25/2023     MICROALBUMIN  07/25/2023     EYE EXAM  07/27/2023     A1C  07/30/2023     PHQ-9  07/30/2023     FALL RISK ASSESSMENT  01/30/2024     CBC  01/30/2024     COLORECTAL CANCER SCREENING  02/10/2026     ADVANCE CARE PLANNING  07/14/2027     DEXA  08/09/2036     TSH W/FREE T4 REFLEX  Completed     HEPATITIS C SCREENING  Completed     DEPRESSION ACTION PLAN  Completed     Pneumococcal Vaccine: 65+ Years  Completed     IPV IMMUNIZATION  Aged Out     MENINGITIS IMMUNIZATION  Aged Out         ROS:      OBJECTIVE:   BP (!) 152/90 (BP Location: Left arm, Patient Position: Chair, Cuff Size: Adult Regular)   Pulse 79   Temp 97.7  F (36.5  C) (Tympanic)   Resp 16   Ht 1.473 m (4' 10\")   Wt 73.5 kg (162 lb)   SpO2 99%   BMI 33.86 kg/m   Estimated body mass index is 33.86 kg/m  as calculated from the following:    Height as of this encounter: 1.473 m (4' 10\").    Weight as of this encounter: 73.5 kg (162 lb).  EXAM:   GENERAL: healthy, alert and no distress  EYES: Eyes grossly normal to inspection, PERRL, EOMI, sclerae white and conjunctivae normal  RESP: lungs clear to auscultation - no crackles or wheezes, no areas of dullness, no tachypnea  CV: Heart regular rate and rhythm without murmur, click or rub. No peripheral edema and peripheral pulses strong  MS: no gross musculoskeletal defects noted, no edema  SKIN: no suspicious lesions or rashes to visible skin  NEURO: Normal strength and tone, sensory exam grossly normal, mentation intact, oriented times 3 and cranial nerves 2-12 intact  PSYCH: mentation appears normal, affect normal/bright   DIABETIC FOOT EXAM: normal DP and PT pulses, no trophic changes or ulcerative lesions, normal sensory exam and normal monofilament exam     Results for orders placed or performed in visit on 01/30/23 " (from the past 24 hour(s))   Hemoglobin A1c   Result Value Ref Range    Hemoglobin A1C 6.6 (H) 0.0 - 5.6 %   CBC with platelets and differential    Narrative    The following orders were created for panel order CBC with platelets and differential.  Procedure                               Abnormality         Status                     ---------                               -----------         ------                     CBC with platelets and d...[[id number]]                      Final result                 Please view results for these tests on the individual orders.   CBC with platelets and differential   Result Value Ref Range    WBC Count 7.3 4.0 - 11.0 10e3/uL    RBC Count 4.11 3.80 - 5.20 10e6/uL    Hemoglobin 12.2 11.7 - 15.7 g/dL    Hematocrit 37.9 35.0 - 47.0 %    MCV 92 78 - 100 fL    MCH 29.7 26.5 - 33.0 pg    MCHC 32.2 31.5 - 36.5 g/dL    RDW 12.7 10.0 - 15.0 %    Platelet Count 261 150 - 450 10e3/uL    % Neutrophils 70 %    % Lymphocytes 17 %    % Monocytes 8 %    % Eosinophils 3 %    % Basophils 1 %    % Immature Granulocytes 0 %    Absolute Neutrophils 5.1 1.6 - 8.3 10e3/uL    Absolute Lymphocytes 1.3 0.8 - 5.3 10e3/uL    Absolute Monocytes 0.6 0.0 - 1.3 10e3/uL    Absolute Eosinophils 0.3 0.0 - 0.7 10e3/uL    Absolute Basophils 0.1 0.0 - 0.2 10e3/uL    Absolute Immature Granulocytes 0.0 <=0.4 10e3/uL      Lab Results   Component Value Date    A1C 6.6 01/30/2023    A1C 6.1 07/25/2022    A1C 6.4 08/02/2021    A1C 6.4 11/30/2020    A1C 7.4 03/09/2020    A1C 6.3 09/05/2019    A1C 6.8 12/17/2018    A1C 6.4 05/25/2016          ASSESSMENT / PLAN:   (Z00.00) Encounter for Medicare annual wellness exam  (primary encounter diagnosis)  Comment:   Plan: COVID-19 VACCINE BIVALENT BOOSTER 12+ (PFIZER),        INFLUENZA VACCINE 65+ (FLUZONE HD), TDAP         VACCINE (Adacel, Boostrix)  [[id number]]        follow up in 1 year    (E11.29,  R80.9) Type 2 diabetes mellitus with microalbuminuria, without long-term current  "use of insulin (H)  Comment: well controlled. It turns out that she has been taking 4 metformin tablets daily (2 BID), taken with the potassium citrate, which is fine by me, so I have updated the quantity of her prescription to reflect that.  Plan: HgbA1c, FOOT EXAM, metFORMIN (GLUCOPHAGE XR) 500 MG 24         hr tablet        follow up in 6 months    (I11.0) Benign hypertensive heart disease with congestive heart failure (H)  Comment: uncontrolled on 25 mg losartan.  Plan: Creatinine, Potassium, losartan (COZAAR) 50 MG tablet       Increase losartan to 50 mg. Return in about 10 weeks (around 4/10/2023) for blood pressure check.      (E78.5) Hyperlipidemia LDL goal <100  Comment: most recently at goal   Plan: lipid panel, ALT, lovastatin (MEVACOR) 40 MG tablet        Recheck lipids in 6-12 months     (D64.9) Low hemoglobin  Comment: resolved.  Plan: CBC w/ differential, soluble transferrin receptor    The remaining anemia workup was cancelled once I saw her results. Nothing further needed.    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  Comment: prescription update   Plan: potassium citrate (UROCIT-K) 10 MEQ (1080 MG)         CR tablet            (Z12.31) Visit for screening mammogram  Comment:   Plan: MA SCREENING DIGITAL BILAT - Future  (s+30)            (Z23) Need for vaccination  Comment:   Plan: COVID-19 VACCINE BIVALENT BOOSTER 12+ (PFIZER),        INFLUENZA VACCINE 65+ (FLUZONE HD), TDAP         VACCINE (Adacel, Boostrix)  [[id number]]                COUNSELING:  Reviewed preventive health counseling, as reflected in patient instructions  Special attention given to:       Immunizations    Vaccinated for: Covid-19, Influenza and TDAP             Breast Cancer Screening    Estimated body mass index is 33.86 kg/m  as calculated from the following:    Height as of this encounter: 1.473 m (4' 10\").    Weight as of this encounter: 73.5 kg (162 lb).    Weight management plan: exercise recommendations are in the AVS    She reports that " she has never smoked. She has never used smokeless tobacco.    Appropriate preventive services were discussed with this patient, including applicable screening as appropriate for cardiovascular disease, diabetes, osteopenia/osteoporosis, and glaucoma.  As appropriate for age/gender, discussed screening for colorectal cancer, prostate cancer, breast cancer, and cervical cancer. Checklist reviewing preventive services available has been given to the patient.    Reviewed patients plan of care and provided an AVS. The Basic Care Plan (routine screening as documented in Health Maintenance) for Amalia meets the Care Plan requirement. This Care Plan has been established and reviewed with the Patient.    Counseling Resources:  ATP IV Guidelines  Pooled Cohorts Equation Calculator  Breast Cancer Risk Calculator  BRCA-Related Cancer Risk Assessment: FHS-7 Tool  FRAX Risk Assessment  ICSI Preventive Guidelines  Dietary Guidelines for Americans, 2010  USDA's MyPlate  ASA Prophylaxis  Lung CA Screening    Terry Sanchez MD  [facility]    This document serves as a record of the services and decisions personally performed and made by Dr. Sanchez. It was created on his behalf by Selvin Roach, a trained medical scribe. The creation of this document is based the provider's statements to the medical scribe.  Selvin Roach,  4:24 PM

## 2023-01-30 NOTE — PATIENT INSTRUCTIONS
Patient Education   Personalized Prevention Plan  You are due for the preventive services outlined below.  Your care team is available to assist you in scheduling these services.  If you have already completed any of these items, please share that information with your care team to update in your medical record.  Health Maintenance Due   Topic Date Due    Heart Failure Action Plan  Never done    Zoster (Shingles) Vaccine (1 of 2) Never done    Annual Wellness Visit  12/10/2019    COVID-19 Vaccine (3 - Booster for Pfizer series) 05/28/2021    Diptheria Tetanus Pertussis (DTAP/TDAP/TD) Vaccine (2 - Td or Tdap) 09/27/2021    Flu Vaccine (1) 09/01/2022    Basic Metabolic Panel  01/25/2023    Mammogram  01/15/2023            Patient Education   Preventive Health Recommendations    See your health care provider every year to  Review health changes.   Discuss preventive care.    Review your medicines if your doctor has prescribed any.  You no longer need a yearly Pap test unless you've had an abnormal Pap test in the past 10 years. If you have vaginal symptoms, such as bleeding or discharge, be sure to talk with your provider about a Pap test.  Every 1 to 2 years, have a mammogram.  If you are over 69, talk with your health care provider about whether or not you want to continue having screening mammograms.  Every 10 years, have a colonoscopy. Or, have a yearly FIT test (stool test). These exams will check for colon cancer.   Have a cholesterol test every 5 years, or more often if your doctor advises it.   Have a diabetes test (fasting glucose) every three years. If you are at risk for diabetes, you should have this test more often.   At age 65, have a bone density scan (DEXA) to check for osteoporosis (brittle bone disease).    Shots:  Get a flu shot each year.  Get a tetanus shot every 10 years.  Talk to your doctor about your pneumonia vaccines. There are now two you should receive - Pneumovax (PPSV 23) and Prevnar  (PCV 13).  Talk to your pharmacist about the shingles vaccine.  Talk to your doctor about the hepatitis B vaccine.    Nutrition:   Eat at least 5 servings of fruits and vegetables each day.  Eat whole-grain bread, whole-wheat pasta and brown rice instead of white grains and rice.  Get adequate Calcium and Vitamin D.     Lifestyle  Exercise at least 150 minutes a week (30 minutes a day, 5 days a week). This will help you control your weight and prevent disease.  Limit alcohol to one drink per day.  No smoking.   Wear sunscreen to prevent skin cancer.   See your dentist twice a year for an exam and cleaning.  See your eye doctor every 1 to 2 years to screen for conditions such as glaucoma, macular degeneration and cataracts.    Personalized Prevention Plan  You are due for the preventive services outlined below.  Your care team is available to assist you in scheduling these services.  If you have already completed any of these items, please share that information with your care team to update in your medical record.  Health Maintenance   Topic Date Due    HF ACTION PLAN  Never done    ZOSTER IMMUNIZATION (1 of 2) Never done    MEDICARE ANNUAL WELLNESS VISIT  12/10/2019    COVID-19 Vaccine (3 - Booster for Pfizer series) 05/28/2021    DTAP/TDAP/TD IMMUNIZATION (2 - Td or Tdap) 09/27/2021    INFLUENZA VACCINE (1) 09/01/2022    BMP  01/25/2023    MAMMO SCREENING  01/15/2023    ANNUAL REVIEW OF HM ORDERS  07/14/2023    ALT  07/25/2023    LIPID  07/25/2023    MICROALBUMIN  07/25/2023    EYE EXAM  07/27/2023    A1C  07/30/2023    PHQ-9  07/30/2023    DIABETIC FOOT EXAM  01/30/2024    FALL RISK ASSESSMENT  01/30/2024    CBC  01/30/2024    COLORECTAL CANCER SCREENING  02/10/2026    ADVANCE CARE PLANNING  07/14/2027    DEXA  08/09/2036    TSH W/FREE T4 REFLEX  Completed    HEPATITIS C SCREENING  Completed    DEPRESSION ACTION PLAN  Completed    Pneumococcal Vaccine: 65+ Years  Completed    IPV IMMUNIZATION  Aged Out     MENINGITIS IMMUNIZATION  Aged Out        Please call [facility]: [phone] to schedule your mammogram at [facility].

## 2023-01-30 NOTE — NURSING NOTE
Prior to immunization administration, verified patients identity using patient s name and date of birth. Please see Immunization Activity for additional information.     Screening Questionnaire for Adult Immunization    Are you sick today?   No   Do you have allergies to medications, food, a vaccine component or latex?   No   Have you ever had a serious reaction after receiving a vaccination?   No   Do you have a long-term health problem with heart, lung, kidney, or metabolic disease (e.g., diabetes), asthma, a blood disorder, no spleen, complement component deficiency, a cochlear implant, or a spinal fluid leak?  Are you on long-term aspirin therapy?   Yes   Do you have cancer, leukemia, HIV/AIDS, or any other immune system problem?   No   Do you have a parent, brother, or sister with an immune system problem?   No   In the past 3 months, have you taken medications that affect  your immune system, such as prednisone, other steroids, or anticancer drugs; drugs for the treatment of rheumatoid arthritis, Crohn s disease, or psoriasis; or have you had radiation treatments?   No   Have you had a seizure, or a brain or other nervous system problem?   No   During the past year, have you received a transfusion of blood or blood    products, or been given immune (gamma) globulin or antiviral drug?   No   For women: Are you pregnant or is there a chance you could become       pregnant during the next month?   No   Have you received any vaccinations in the past 4 weeks?   No     Immunization questionnaire was positive for at least one answer.  Notified Laura.        Per orders of Dr. Sanchez, injection of Tdap. Fluzone HD, Pfizer Booster given by Regla Banks MA. Patient instructed to remain in clinic for 15 minutes afterwards, and to report any adverse reaction to me immediately.       Screening performed by Regla Banks MA on 1/30/2023 at 2:57 PM.

## 2023-01-31 LAB
ALT SERPL W P-5'-P-CCNC: 20 U/L (ref 0–50)
CHOLEST SERPL-MCNC: 160 MG/DL
CREAT SERPL-MCNC: 0.81 MG/DL (ref 0.52–1.04)
FASTING STATUS PATIENT QL REPORTED: YES
GFR SERPL CREATININE-BSD FRML MDRD: 78 ML/MIN/1.73M2
HDLC SERPL-MCNC: 49 MG/DL
LDLC SERPL CALC-MCNC: 60 MG/DL
NONHDLC SERPL-MCNC: 111 MG/DL
POTASSIUM BLD-SCNC: 4.2 MMOL/L (ref 3.4–5.3)
STFR SERPL-MCNC: 2.6 MG/L
TRIGL SERPL-MCNC: 256 MG/DL

## 2023-03-29 ENCOUNTER — TELEPHONE (OUTPATIENT)
Dept: FAMILY MEDICINE | Facility: CLINIC | Age: 73
End: 2023-03-29
Payer: MEDICARE

## 2023-03-29 NOTE — TELEPHONE ENCOUNTER
Patient Quality Outreach    Patient is due for the following:   Breast Cancer Screening - Mammogram      Topic Date Due     Zoster (Shingles) Vaccine (1 of 2) Never done       Next Steps:   Patient has upcoming appointment, these items will be addressed at that time. Mammogram scheduled for 4/11/23     Type of outreach:    Chart review performed, no outreach needed.      Questions for provider review:    None     Lulu Blake MA

## 2023-04-23 ENCOUNTER — HEALTH MAINTENANCE LETTER (OUTPATIENT)
Age: 73
End: 2023-04-23

## 2023-07-13 ENCOUNTER — TELEPHONE (OUTPATIENT)
Dept: FAMILY MEDICINE | Facility: CLINIC | Age: 73
End: 2023-07-13
Payer: MEDICARE

## 2023-07-13 NOTE — TELEPHONE ENCOUNTER
Patient Quality Outreach    Patient is due for the following:   Hypertension -  Hypertension follow-up visit  Breast Cancer Screening - Mammogram  Depression  -  PHQ-9 needed      Topic Date Due     Zoster (Shingles) Vaccine (1 of 2) Never done     COVID-19 Vaccine (4 - Pfizer series) 05/30/2023       Next Steps:   Schedule a office visit for BP    Type of outreach:    Sent letter.      Questions for provider review:    None           Lulu Blake MA

## 2023-07-13 NOTE — LETTER
July 13, 2023    Amalia Harris  [address]  [place] [address]    Dear Amalia,    At [facility] we care about your health and are committed to providing quality patient care.     Here is a list of Health Maintenance topics that are due now or due soon:  Health Maintenance Due   Topic Date Due    HF ACTION PLAN  Never done    ZOSTER IMMUNIZATION (1 of 2) Never done    TSH W/FREE T4 REFLEX  09/12/2019    MAMMO SCREENING  01/15/2023    BMP  01/25/2023    COVID-19 Vaccine (4 - Pfizer series) 05/30/2023    ANNUAL REVIEW OF HM ORDERS  07/14/2023    MICROALBUMIN  07/25/2023    EYE EXAM  07/27/2023    PHQ-9  07/30/2023        We are recommending that you:     Eye Exam is due:  If this was done within the last 12 months then please contact the clinic or respond to this message with the date and location so we can update your records.     and   Schedule a Nurse-Only appointment to update your immunizations: Your records indicate that you are not up to date with your immunizations, please schedule a nurse-only appointment to get these updated or update them at your next office visit. If this is incorrect, please disregard.    To schedule an appointment or discuss this further, you may contact us by phone at the [facility] at [phone] or online through the patient portal/[email or web address] @ [email or web address]    Thank you for trusting [facility] and we appreciate the opportunity to serve you.  We look forward to supporting your healthcare needs in the future.    Your partners in health,      Quality Committee at [facility]

## 2023-07-22 DIAGNOSIS — R80.9 TYPE 2 DIABETES MELLITUS WITH MICROALBUMINURIA, WITHOUT LONG-TERM CURRENT USE OF INSULIN (H): ICD-10-CM

## 2023-07-22 DIAGNOSIS — I11.0 BENIGN HYPERTENSIVE HEART DISEASE WITH CONGESTIVE HEART FAILURE (H): ICD-10-CM

## 2023-07-22 DIAGNOSIS — R82.991 HYPOCITRATURIA: ICD-10-CM

## 2023-07-22 DIAGNOSIS — E11.29 TYPE 2 DIABETES MELLITUS WITH MICROALBUMINURIA, WITHOUT LONG-TERM CURRENT USE OF INSULIN (H): ICD-10-CM

## 2023-07-22 NOTE — LETTER
August 1, 2023      Amalia Harris  [address] [address]          Dear Amalia Harris        At [facility] we care about your health and are committed to providing quality patient care. Regular appointments are a vital part of the care and management of your health and can help prevent many of the complications that can occur.      It has come to our attention that you are due for a Diabetes office visit. Please call [facility] at [phone] soon to schedule your appointment.    If you have transferred care to another clinic please call to inform us so that we do not continue to send you reminder letters.      Sincerely,      [facility] Care Team       Sincerely,        Terry Sanchez MD

## 2023-07-31 RX ORDER — METFORMIN HCL 500 MG
TABLET, EXTENDED RELEASE 24 HR ORAL
Qty: 360 TABLET | Refills: 0 | Status: SHIPPED | OUTPATIENT
Start: 2023-07-31 | End: 2023-10-30

## 2023-07-31 RX ORDER — LOSARTAN POTASSIUM 25 MG/1
TABLET ORAL
Qty: 90 TABLET | Refills: 0 | Status: SHIPPED | OUTPATIENT
Start: 2023-07-31 | End: 2023-01-30 | Stop reason: DRUGHIGH

## 2023-07-31 RX ORDER — POTASSIUM CITRATE 10 MEQ/1
TABLET, EXTENDED RELEASE ORAL
Qty: 540 TABLET | Refills: 0 | Status: SHIPPED | OUTPATIENT
Start: 2023-07-31 | End: 2023-11-08

## 2023-07-31 NOTE — TELEPHONE ENCOUNTER
Called and left a voicemail message to return our call to schedule an office visit.  Ysabel Townsend MA  [facility]   Primary Care

## 2023-08-01 DIAGNOSIS — I11.0 BENIGN HYPERTENSIVE HEART DISEASE WITH CONGESTIVE HEART FAILURE (H): ICD-10-CM

## 2023-08-01 RX ORDER — LOSARTAN POTASSIUM 50 MG/1
TABLET ORAL
Qty: 90 TABLET | OUTPATIENT
Start: 2023-08-01

## 2023-08-01 NOTE — TELEPHONE ENCOUNTER
Called and left a voicemail message and sent a letter to schedule an appointment.  Ysabel Townsend MA  [facility]   Primary Care

## 2023-08-29 DIAGNOSIS — I11.0 BENIGN HYPERTENSIVE HEART DISEASE WITH CONGESTIVE HEART FAILURE (H): ICD-10-CM

## 2023-09-11 RX ORDER — LOSARTAN POTASSIUM 50 MG/1
50 TABLET ORAL DAILY
Qty: 30 TABLET | Refills: 0 | Status: ON HOLD | OUTPATIENT
Start: 2023-09-11 | End: 2023-11-01

## 2023-09-11 NOTE — TELEPHONE ENCOUNTER
Diabetes check was due in JUly. BP uncontrolled and recheck was due in April. Please get her scheduled.

## 2023-09-30 ENCOUNTER — HEALTH MAINTENANCE LETTER (OUTPATIENT)
Age: 73
End: 2023-09-30

## 2023-10-23 ENCOUNTER — PATIENT OUTREACH (OUTPATIENT)
Dept: GASTROENTEROLOGY | Facility: CLINIC | Age: 73
End: 2023-10-23
Payer: MEDICARE

## 2023-10-28 DIAGNOSIS — R80.9 TYPE 2 DIABETES MELLITUS WITH MICROALBUMINURIA, WITHOUT LONG-TERM CURRENT USE OF INSULIN (H): ICD-10-CM

## 2023-10-28 DIAGNOSIS — E11.29 TYPE 2 DIABETES MELLITUS WITH MICROALBUMINURIA, WITHOUT LONG-TERM CURRENT USE OF INSULIN (H): ICD-10-CM

## 2023-10-30 ENCOUNTER — HOSPITAL ENCOUNTER (OUTPATIENT)
Facility: HOSPITAL | Age: 73
Setting detail: OBSERVATION
Discharge: HOME | End: 2023-11-01
Attending: STUDENT IN AN ORGANIZED HEALTH CARE EDUCATION/TRAINING PROGRAM | Admitting: STUDENT IN AN ORGANIZED HEALTH CARE EDUCATION/TRAINING PROGRAM
Payer: MEDICARE

## 2023-10-30 ENCOUNTER — APPOINTMENT (OUTPATIENT)
Dept: CT IMAGING | Facility: HOSPITAL | Age: 73
End: 2023-10-30
Attending: STUDENT IN AN ORGANIZED HEALTH CARE EDUCATION/TRAINING PROGRAM
Payer: MEDICARE

## 2023-10-30 DIAGNOSIS — E11.29 TYPE 2 DIABETES MELLITUS WITH MICROALBUMINURIA, WITHOUT LONG-TERM CURRENT USE OF INSULIN (H): ICD-10-CM

## 2023-10-30 DIAGNOSIS — R09.02 HYPOXIA: ICD-10-CM

## 2023-10-30 DIAGNOSIS — R80.9 TYPE 2 DIABETES MELLITUS WITH MICROALBUMINURIA, WITHOUT LONG-TERM CURRENT USE OF INSULIN (H): ICD-10-CM

## 2023-10-30 DIAGNOSIS — Q24.8 LEFT VENTRICULAR OUTFLOW TRACT OBSTRUCTION: Primary | ICD-10-CM

## 2023-10-30 DIAGNOSIS — J69.0 ASPIRATION PNEUMONIA, UNSPECIFIED ASPIRATION PNEUMONIA TYPE, UNSPECIFIED LATERALITY, UNSPECIFIED PART OF LUNG (H): ICD-10-CM

## 2023-10-30 LAB
ALBUMIN SERPL BCG-MCNC: 4.1 G/DL (ref 3.5–5.2)
ALP SERPL-CCNC: 52 U/L (ref 35–104)
ALT SERPL W P-5'-P-CCNC: 21 U/L (ref 0–50)
ANION GAP SERPL CALCULATED.3IONS-SCNC: 12 MMOL/L (ref 7–15)
AST SERPL W P-5'-P-CCNC: 23 U/L (ref 0–45)
BASOPHILS # BLD AUTO: 0.1 10E3/UL (ref 0–0.2)
BASOPHILS NFR BLD AUTO: 1 %
BILIRUB DIRECT SERPL-MCNC: <0.2 MG/DL (ref 0–0.3)
BILIRUB SERPL-MCNC: 0.3 MG/DL
BUN SERPL-MCNC: 17.7 MG/DL (ref 8–23)
CALCIUM SERPL-MCNC: 9.5 MG/DL (ref 8.8–10.2)
CHLORIDE SERPL-SCNC: 102 MMOL/L (ref 98–107)
CREAT SERPL-MCNC: 0.88 MG/DL (ref 0.51–0.95)
DEPRECATED HCO3 PLAS-SCNC: 22 MMOL/L (ref 22–29)
EGFRCR SERPLBLD CKD-EPI 2021: 70 ML/MIN/1.73M2
EOSINOPHIL # BLD AUTO: 0.2 10E3/UL (ref 0–0.7)
EOSINOPHIL NFR BLD AUTO: 2 %
ERYTHROCYTE [DISTWIDTH] IN BLOOD BY AUTOMATED COUNT: 12.9 % (ref 10–15)
FLUAV RNA SPEC QL NAA+PROBE: NEGATIVE
FLUBV RNA RESP QL NAA+PROBE: NEGATIVE
GLUCOSE BLDC GLUCOMTR-MCNC: 144 MG/DL (ref 70–99)
GLUCOSE BLDC GLUCOMTR-MCNC: 155 MG/DL (ref 70–99)
GLUCOSE BLDC GLUCOMTR-MCNC: 162 MG/DL (ref 70–99)
GLUCOSE BLDC GLUCOMTR-MCNC: 169 MG/DL (ref 70–99)
GLUCOSE SERPL-MCNC: 204 MG/DL (ref 70–99)
HBA1C MFR BLD: 6.6 %
HCT VFR BLD AUTO: 38.6 % (ref 35–47)
HGB BLD-MCNC: 12.6 G/DL (ref 11.7–15.7)
HOLD SPECIMEN: NORMAL
HOLD SPECIMEN: NORMAL
IMM GRANULOCYTES # BLD: 0.1 10E3/UL
IMM GRANULOCYTES NFR BLD: 2 %
LYMPHOCYTES # BLD AUTO: 1.8 10E3/UL (ref 0.8–5.3)
LYMPHOCYTES NFR BLD AUTO: 20 %
MCH RBC QN AUTO: 29.4 PG (ref 26.5–33)
MCHC RBC AUTO-ENTMCNC: 32.6 G/DL (ref 31.5–36.5)
MCV RBC AUTO: 90 FL (ref 78–100)
MONOCYTES # BLD AUTO: 1 10E3/UL (ref 0–1.3)
MONOCYTES NFR BLD AUTO: 11 %
NEUTROPHILS # BLD AUTO: 5.8 10E3/UL (ref 1.6–8.3)
NEUTROPHILS NFR BLD AUTO: 64 %
NRBC # BLD AUTO: 0 10E3/UL
NRBC BLD AUTO-RTO: 0 /100
NT-PROBNP SERPL-MCNC: 492 PG/ML (ref 0–900)
PLATELET # BLD AUTO: 287 10E3/UL (ref 150–450)
POTASSIUM SERPL-SCNC: 4.2 MMOL/L (ref 3.4–5.3)
PROT SERPL-MCNC: 6.7 G/DL (ref 6.4–8.3)
RBC # BLD AUTO: 4.28 10E6/UL (ref 3.8–5.2)
RSV RNA SPEC NAA+PROBE: NEGATIVE
SARS-COV-2 RNA RESP QL NAA+PROBE: NEGATIVE
SODIUM SERPL-SCNC: 136 MMOL/L (ref 135–145)
TROPONIN T SERPL HS-MCNC: 12 NG/L
TROPONIN T SERPL HS-MCNC: 16 NG/L
WBC # BLD AUTO: 8.9 10E3/UL (ref 4–11)

## 2023-10-30 PROCEDURE — 250N000011 HC RX IP 250 OP 636: Mod: JZ | Performed by: STUDENT IN AN ORGANIZED HEALTH CARE EDUCATION/TRAINING PROGRAM

## 2023-10-30 PROCEDURE — 94640 AIRWAY INHALATION TREATMENT: CPT

## 2023-10-30 PROCEDURE — 85014 HEMATOCRIT: CPT | Performed by: STUDENT IN AN ORGANIZED HEALTH CARE EDUCATION/TRAINING PROGRAM

## 2023-10-30 PROCEDURE — 83036 HEMOGLOBIN GLYCOSYLATED A1C: CPT | Performed by: STUDENT IN AN ORGANIZED HEALTH CARE EDUCATION/TRAINING PROGRAM

## 2023-10-30 PROCEDURE — G1010 CDSM STANSON: HCPCS

## 2023-10-30 PROCEDURE — 999N000285 HC STATISTIC VASC ACCESS LAB DRAW WITH PIV START

## 2023-10-30 PROCEDURE — 96365 THER/PROPH/DIAG IV INF INIT: CPT

## 2023-10-30 PROCEDURE — 250N000009 HC RX 250: Performed by: STUDENT IN AN ORGANIZED HEALTH CARE EDUCATION/TRAINING PROGRAM

## 2023-10-30 PROCEDURE — 36415 COLL VENOUS BLD VENIPUNCTURE: CPT | Performed by: STUDENT IN AN ORGANIZED HEALTH CARE EDUCATION/TRAINING PROGRAM

## 2023-10-30 PROCEDURE — 80053 COMPREHEN METABOLIC PANEL: CPT | Performed by: STUDENT IN AN ORGANIZED HEALTH CARE EDUCATION/TRAINING PROGRAM

## 2023-10-30 PROCEDURE — 250N000013 HC RX MED GY IP 250 OP 250 PS 637: Performed by: STUDENT IN AN ORGANIZED HEALTH CARE EDUCATION/TRAINING PROGRAM

## 2023-10-30 PROCEDURE — 96375 TX/PRO/DX INJ NEW DRUG ADDON: CPT

## 2023-10-30 PROCEDURE — 70496 CT ANGIOGRAPHY HEAD: CPT | Mod: MF

## 2023-10-30 PROCEDURE — 96361 HYDRATE IV INFUSION ADD-ON: CPT

## 2023-10-30 PROCEDURE — 999N000127 HC STATISTIC PERIPHERAL IV START W US GUIDANCE

## 2023-10-30 PROCEDURE — 93005 ELECTROCARDIOGRAM TRACING: CPT

## 2023-10-30 PROCEDURE — 82962 GLUCOSE BLOOD TEST: CPT

## 2023-10-30 PROCEDURE — 93005 ELECTROCARDIOGRAM TRACING: CPT | Performed by: STUDENT IN AN ORGANIZED HEALTH CARE EDUCATION/TRAINING PROGRAM

## 2023-10-30 PROCEDURE — 258N000003 HC RX IP 258 OP 636: Performed by: STUDENT IN AN ORGANIZED HEALTH CARE EDUCATION/TRAINING PROGRAM

## 2023-10-30 PROCEDURE — 87637 SARSCOV2&INF A&B&RSV AMP PRB: CPT | Performed by: STUDENT IN AN ORGANIZED HEALTH CARE EDUCATION/TRAINING PROGRAM

## 2023-10-30 PROCEDURE — P9047 ALBUMIN (HUMAN), 25%, 50ML: HCPCS | Performed by: STUDENT IN AN ORGANIZED HEALTH CARE EDUCATION/TRAINING PROGRAM

## 2023-10-30 PROCEDURE — 83880 ASSAY OF NATRIURETIC PEPTIDE: CPT | Performed by: STUDENT IN AN ORGANIZED HEALTH CARE EDUCATION/TRAINING PROGRAM

## 2023-10-30 PROCEDURE — G0463 HOSPITAL OUTPT CLINIC VISIT: HCPCS | Mod: 25

## 2023-10-30 PROCEDURE — 250N000011 HC RX IP 250 OP 636: Performed by: STUDENT IN AN ORGANIZED HEALTH CARE EDUCATION/TRAINING PROGRAM

## 2023-10-30 PROCEDURE — 82248 BILIRUBIN DIRECT: CPT | Performed by: STUDENT IN AN ORGANIZED HEALTH CARE EDUCATION/TRAINING PROGRAM

## 2023-10-30 PROCEDURE — 70498 CT ANGIOGRAPHY NECK: CPT | Mod: MF

## 2023-10-30 PROCEDURE — 99222 1ST HOSP IP/OBS MODERATE 55: CPT | Performed by: STUDENT IN AN ORGANIZED HEALTH CARE EDUCATION/TRAINING PROGRAM

## 2023-10-30 PROCEDURE — 84484 ASSAY OF TROPONIN QUANT: CPT | Performed by: STUDENT IN AN ORGANIZED HEALTH CARE EDUCATION/TRAINING PROGRAM

## 2023-10-30 PROCEDURE — 250N000012 HC RX MED GY IP 250 OP 636 PS 637: Performed by: STUDENT IN AN ORGANIZED HEALTH CARE EDUCATION/TRAINING PROGRAM

## 2023-10-30 PROCEDURE — G0378 HOSPITAL OBSERVATION PER HR: HCPCS

## 2023-10-30 PROCEDURE — 84484 ASSAY OF TROPONIN QUANT: CPT | Mod: 91 | Performed by: STUDENT IN AN ORGANIZED HEALTH CARE EDUCATION/TRAINING PROGRAM

## 2023-10-30 PROCEDURE — 99285 EMERGENCY DEPT VISIT HI MDM: CPT | Mod: 25

## 2023-10-30 PROCEDURE — 999N000157 HC STATISTIC RCP TIME EA 10 MIN

## 2023-10-30 RX ORDER — IPRATROPIUM BROMIDE AND ALBUTEROL SULFATE 2.5; .5 MG/3ML; MG/3ML
3 SOLUTION RESPIRATORY (INHALATION)
Status: DISCONTINUED | OUTPATIENT
Start: 2023-10-30 | End: 2023-10-30

## 2023-10-30 RX ORDER — ONDANSETRON 2 MG/ML
4 INJECTION INTRAMUSCULAR; INTRAVENOUS EVERY 6 HOURS PRN
Status: DISCONTINUED | OUTPATIENT
Start: 2023-10-30 | End: 2023-11-01 | Stop reason: HOSPADM

## 2023-10-30 RX ORDER — ALBUTEROL SULFATE 90 UG/1
2 AEROSOL, METERED RESPIRATORY (INHALATION) EVERY 6 HOURS PRN
Status: DISCONTINUED | OUTPATIENT
Start: 2023-10-30 | End: 2023-11-01 | Stop reason: HOSPADM

## 2023-10-30 RX ORDER — DEXTROSE MONOHYDRATE 25 G/50ML
25-50 INJECTION, SOLUTION INTRAVENOUS
Status: DISCONTINUED | OUTPATIENT
Start: 2023-10-30 | End: 2023-11-01 | Stop reason: HOSPADM

## 2023-10-30 RX ORDER — METFORMIN HCL 500 MG
TABLET, EXTENDED RELEASE 24 HR ORAL
Qty: 120 TABLET | Refills: 0 | Status: SHIPPED | OUTPATIENT
Start: 2023-10-30 | End: 2023-10-30

## 2023-10-30 RX ORDER — ACETAMINOPHEN 325 MG/1
650 TABLET ORAL EVERY 6 HOURS PRN
Status: DISCONTINUED | OUTPATIENT
Start: 2023-10-30 | End: 2023-11-01 | Stop reason: HOSPADM

## 2023-10-30 RX ORDER — IPRATROPIUM BROMIDE AND ALBUTEROL SULFATE 2.5; .5 MG/3ML; MG/3ML
3 SOLUTION RESPIRATORY (INHALATION) EVERY 4 HOURS PRN
Status: DISCONTINUED | OUTPATIENT
Start: 2023-10-30 | End: 2023-11-01 | Stop reason: HOSPADM

## 2023-10-30 RX ORDER — AMOXICILLIN 250 MG
2 CAPSULE ORAL 2 TIMES DAILY PRN
Status: DISCONTINUED | OUTPATIENT
Start: 2023-10-30 | End: 2023-11-01 | Stop reason: HOSPADM

## 2023-10-30 RX ORDER — AMOXICILLIN 250 MG
1 CAPSULE ORAL 2 TIMES DAILY PRN
Status: DISCONTINUED | OUTPATIENT
Start: 2023-10-30 | End: 2023-11-01 | Stop reason: HOSPADM

## 2023-10-30 RX ORDER — METFORMIN HCL 500 MG
1000 TABLET, EXTENDED RELEASE 24 HR ORAL 2 TIMES DAILY WITH MEALS
COMMUNITY
End: 2023-12-18

## 2023-10-30 RX ORDER — IOPAMIDOL 755 MG/ML
75 INJECTION, SOLUTION INTRAVASCULAR ONCE
Status: COMPLETED | OUTPATIENT
Start: 2023-10-30 | End: 2023-10-30

## 2023-10-30 RX ORDER — FUROSEMIDE 10 MG/ML
40 INJECTION INTRAMUSCULAR; INTRAVENOUS ONCE
Status: COMPLETED | OUTPATIENT
Start: 2023-10-30 | End: 2023-10-30

## 2023-10-30 RX ORDER — ACETAMINOPHEN 650 MG/1
650 SUPPOSITORY RECTAL EVERY 6 HOURS PRN
Status: DISCONTINUED | OUTPATIENT
Start: 2023-10-30 | End: 2023-11-01 | Stop reason: HOSPADM

## 2023-10-30 RX ORDER — ALBUMIN (HUMAN) 12.5 G/50ML
50 SOLUTION INTRAVENOUS ONCE
Status: COMPLETED | OUTPATIENT
Start: 2023-10-30 | End: 2023-10-30

## 2023-10-30 RX ORDER — MECLIZINE HCL 12.5 MG 12.5 MG/1
25 TABLET ORAL ONCE
Status: COMPLETED | OUTPATIENT
Start: 2023-10-30 | End: 2023-10-30

## 2023-10-30 RX ORDER — ONDANSETRON 4 MG/1
4 TABLET, ORALLY DISINTEGRATING ORAL EVERY 6 HOURS PRN
Status: DISCONTINUED | OUTPATIENT
Start: 2023-10-30 | End: 2023-11-01 | Stop reason: HOSPADM

## 2023-10-30 RX ORDER — LOVASTATIN 20 MG
40 TABLET ORAL EVERY EVENING
Status: DISCONTINUED | OUTPATIENT
Start: 2023-10-30 | End: 2023-11-01 | Stop reason: HOSPADM

## 2023-10-30 RX ORDER — LOSARTAN POTASSIUM 50 MG/1
50 TABLET ORAL DAILY
Status: DISCONTINUED | OUTPATIENT
Start: 2023-10-31 | End: 2023-10-31

## 2023-10-30 RX ORDER — NICOTINE POLACRILEX 4 MG
15-30 LOZENGE BUCCAL
Status: DISCONTINUED | OUTPATIENT
Start: 2023-10-30 | End: 2023-11-01 | Stop reason: HOSPADM

## 2023-10-30 RX ORDER — FUROSEMIDE 10 MG/ML
40 INJECTION INTRAMUSCULAR; INTRAVENOUS EVERY 12 HOURS
Status: DISCONTINUED | OUTPATIENT
Start: 2023-10-31 | End: 2023-11-01

## 2023-10-30 RX ADMIN — LOVASTATIN 40 MG: 20 TABLET ORAL at 22:58

## 2023-10-30 RX ADMIN — IPRATROPIUM BROMIDE AND ALBUTEROL SULFATE 3 ML: .5; 3 SOLUTION RESPIRATORY (INHALATION) at 19:53

## 2023-10-30 RX ADMIN — IOPAMIDOL 75 ML: 755 INJECTION, SOLUTION INTRAVENOUS at 16:15

## 2023-10-30 RX ADMIN — MECLIZINE HYDROCHLORIDE 25 MG: 12.5 TABLET ORAL at 14:27

## 2023-10-30 RX ADMIN — FUROSEMIDE 40 MG: 10 INJECTION, SOLUTION INTRAMUSCULAR; INTRAVENOUS at 17:18

## 2023-10-30 RX ADMIN — INSULIN ASPART 1 UNITS: 100 INJECTION, SOLUTION INTRAVENOUS; SUBCUTANEOUS at 21:44

## 2023-10-30 RX ADMIN — SODIUM CHLORIDE, POTASSIUM CHLORIDE, SODIUM LACTATE AND CALCIUM CHLORIDE 1000 ML: 600; 310; 30; 20 INJECTION, SOLUTION INTRAVENOUS at 14:45

## 2023-10-30 RX ADMIN — ALBUMIN HUMAN 50 G: 0.25 SOLUTION INTRAVENOUS at 22:34

## 2023-10-30 RX ADMIN — IOPAMIDOL 75 ML: 755 INJECTION, SOLUTION INTRAVENOUS at 16:16

## 2023-10-30 ASSESSMENT — ACTIVITIES OF DAILY LIVING (ADL)
ADLS_ACUITY_SCORE: 35

## 2023-10-30 NOTE — ED NOTES
Provider notified and 40mg of lasix ordered, purewick in place. Patient is agreeable to this plan at this time. VSS, cooperative and calm

## 2023-10-30 NOTE — ED TRIAGE NOTES
"Pt sitting in w/c and speaks to ED staff with her eyes closed. She reports \"room spinning dizziness and nausea \" after taking her meds 30 mis ago. Provider in triage for neuro eval.         "

## 2023-10-30 NOTE — ED PROVIDER NOTES
EMERGENCY DEPARTMENT ENCOUNTER      NAME: Amalia Harris  AGE: 73 year old female  YOB: 1950  MRN: [id number]  EVALUATION DATE & TIME: 10/30/2023  1:51 PM    PCP: Terry Sanchez    ED PROVIDER: Dawson Arredondo MD      Chief Complaint   Patient presents with    Dizziness         FINAL IMPRESSION:  No diagnosis found.      ED COURSE & MEDICAL DECISION MAKING:    Pertinent Labs & Imaging studies reviewed. (See chart for details)  73 year old female presents to the Emergency Department for evaluation of dizziness as well as several other complaints including chest pain, abdominal pain neck pain and headache.     initial examination performed in triage given possible concern for central etiology of symptoms.  Patient is hemodynamically stable and afebrile.  She has no facial droop.  Tongue protrudes midline.  Symmetrical strength in upper lower extremities bilaterally with intact sensation to touch.  Normal finger-to-nose.  She has a systolic murmur but heart is regular rate and rhythm.  2+ right radial pulse.  No significant lower extremity edema.  Abdomen is soft and nondistended.    Initial concern for possible dissection given her chest abdominal and neck pain as well as dizziness.  Will obtain CT of the head as well as CTA of the head and neck vessels along with chest abdomen pelvis to evaluate for possible aortic disease.  EKG and lab work ordered.    Labs reviewed and interpreted myself.  No significant electrolyte derangements.  CBC shows normal white count and stable hemoglobin.  Initial troponin 16 but repeat down to 12 and EKG does not show any dynamic changes.  Lower suspicion for ACS as cause of patient's symptoms.      CT scan of the head and vessels including CT of the chest and abdomen to evaluate already do not show any acute vascular etiology.  No signs of aortic dissection.  No pulmonary embolism.    CT was notable for some groundglass opacities possibly suggestive of pulmonary edema.    Upon  returning from CT patient did note some shortness of breath and was noted to be hypoxic in the mid to low 80s on room air.  I Evaluate the patient and no hypotension, wheezing or rash and I do not suspect this is allergic reaction or anaphylaxis related to IV contrast.  Upon reevaluation does have bilateral rhonchorous breath sounds more prominent on the right side which are new compared to her examination at presentation.  Discussed with the son he did note the patient had an episode of vomiting before going to CT scan.  Suspect he may have had possible aspiration event or possible aspiration pneumonitis given her O2 requirement.  Patient did receive 1 L of IV fluids here in the emergency department for possibility of orthostasis at presentation but this may have contributed to pulmonary edema which was seen on her CT scan.  Most recent echo and CT showed preserved ejection fraction at 65% with possible patient has developed a degree of congestive heart failure.  Ordered 40 IV Lasix in the ED.    Additionally upon reevaluation patient states her dizziness has essentially resolved and lower suspicion for acute central etiology at this point in time particular as patient has been able to ambulate with minimal assistance in the emergency department.    Patient has an ongoing O2 requirement at this point in time which is new for her.  Could be related to either mild volume overload or aspiration pneumonitis.  Discussed with hospitalist and will admit for observation this point in time to evaluate for change versus resolution in her O2 requirement.    ED Course as of 10/30/23 1805   Mon Oct 30, 2023   1656 CTA Chest Abdomen Pelvis w Contrast     12:15 PM I met and evaluated the patient.     Medical Decision Making    History:  Supplemental history from: Documented in chart, if applicable  External Record(s) reviewed: Documented in chart, if applicable.    Work Up:  Chart documentation includes differential considered  and any EKGs or imaging independently interpreted by provider, where specified.  In additional to work up documented, I considered the following work up: Documented in chart, if applicable.    External consultation:  Discussion of management with another provider: Documented in chart, if applicable    Complicating factors:  Care impacted by chronic illness: Diabetes, Heart Disease, Hyperlipidemia, and Hypertension  Care affected by social determinants of health: Access to Medical Care    Disposition considerations: Admit.       At the conclusion of the encounter I discussed the results of all of the tests and the disposition. The questions were answered. The patient or family acknowledged understanding and was agreeable with the care plan.      0 minutes of critical care time     MEDICATIONS GIVEN IN THE EMERGENCY:  Medications   lactated ringers BOLUS 1,000 mL (has no administration in time range)       NEW PRESCRIPTIONS STARTED AT TODAY'S ER VISIT  New Prescriptions    No medications on file          =================================================================    [facility]    Patient information was obtained from: patient  .      Amalia Harris is a 73 year old female with a pertinent history of CHF, DM2, severe obesity, HLD, HTN, who presents to this ED by walk in with family for evaluation of dizziness.     Accompanied by her son who acts as a .    Patient states that earlier this morning while she was sitting she began having dizziness and room spinning sensation has been somewhat persistent.  Is exacerbated by standing up and she has been able to ambulate without falling over.  However, she also notes abdominal chest and neck pain as well as a headache.   Denies any double vision or changes in vision.  No recent fevers at home.  Denies any urinary symptoms.  Also notes bilateral upper and lower extremity aches and pains.  Denies any blood thinners.          REVIEW OF SYSTEMS   Refer to the HPI    PAST  MEDICAL HISTORY:  Past Medical History:   Diagnosis Date    Guaiac positive stools 6/6/2016    Hyperlipidemia LDL goal <130 3/23/2011    Hypertension     Major depression 3/23/2011    Nonsenile cataract     Osteoarthritis 3/23/2011    Severe obesity (BMI 35.0-39.9) with comorbidity (H) 8/27/2014    HTN, diabetes       PAST SURGICAL HISTORY:  Past Surgical History:   Procedure Laterality Date    CHOLECYSTECTOMY      COLONOSCOPY WITH CO2 INSUFFLATION N/A 02/10/2021    Procedure: COLONOSCOPY, WITH CO2 INSUFFLATION;  Surgeon: Loco Andersen MD;  Location: [facility] OR    GALLBLADDER SURGERY  1986    PHACOEMULSIFICATION WITH STANDARD INTRAOCULAR LENS IMPLANT Right 11/11/2019    Procedure: RIGHT PHACOEMULSIFICATION, CATARACT, WITH STANDARD IOL INSERTION;  Surgeon: Robel Clancy MD;  Location: [facility] OR    PHACOEMULSIFICATION WITH STANDARD INTRAOCULAR LENS IMPLANT Left 11/25/2019    Procedure: LEFT PHACOEMULSIFICATION, CATARACT, WITH STANDARD IOL INSERTION;  Surgeon: Robel Clancy MD;  Location: [facility] OR    [place] CYSTO/URETERO W/LITHOTRIPSY &INDWELL STENT INSRT Right 09/29/2017    Procedure: CYSTOSCOPY, RIGHT URETEROSCOPY LASER LITHOTRIPSY STENT INSERTION;  Surgeon: Dominguez Stoner MD;  Location: [facility] OR;  Service: Urology           CURRENT MEDICATIONS:    blood glucose (FREESTYLE TEST STRIPS) test strip  blood glucose (NO BRAND SPECIFIED) lancets standard  blood glucose monitoring (ACCU-CHEK FASTCLIX) lancets  blood glucose monitoring (NO BRAND SPECIFIED) meter device kit  cyclobenzaprine (FLEXERIL) 5 MG tablet  famotidine (PEPCID) 20 MG tablet  lancets 28G MISC  losartan (COZAAR) 50 MG tablet  lovastatin (MEVACOR) 40 MG tablet  metFORMIN (GLUCOPHAGE XR) 500 MG 24 hr tablet  olopatadine (PATANOL) 0.1 % ophthalmic solution  potassium citrate (UROCIT-K) 10 MEQ (1080 MG) CR tablet        ALLERGIES:  Allergies   Allergen Reactions    No Clinical Screening - See Comments Rash     Tele patches       FAMILY  HISTORY:  Family History   Problem Relation Age of Onset    Respiratory Father     Hyperlipidemia Father     Hyperlipidemia Sister     Cancer Brother         throat or esophageal (alcohol)    Hypertension No family hx of     Cerebrovascular Disease No family hx of     Coronary Artery Disease No family hx of     Diabetes No family hx of     Anesthesia Reaction No family hx of     Bleeding Disorder No family hx of     Thrombophilia No family hx of     Asthma Father        SOCIAL HISTORY:   Social History     Socioeconomic History    Marital status: Single   Tobacco Use    Smoking status: Never    Smokeless tobacco: Never   Substance and Sexual Activity    Alcohol use: No     Alcohol/week: 0.0 standard drinks of alcohol    Drug use: No    Sexual activity: Not Currently   Other Topics Concern    Parent/sibling w/ CABG, MI or angioplasty before 65F 55M? No       VITALS:  BP (!) 149/50   Pulse 98   Temp 98.6  F (37  C)   Resp 20   Wt 73.5 kg (162 lb)   SpO2 97%   BMI 33.86 kg/m      PHYSICAL EXAM    Constitutional: Well developed, Well nourished, NAD,  HENT: Normocephalic, Atraumatic,  mucous membranes moist,   Neck- trachea midline, No stridor.    Eyes:EOMI, Conjunctiva normal, No discharge.   Respiratory: Normal breath sounds, No respiratory distress, No wheezing.    Cardiovascular:  systolic murmur, regular rate, good peripheral perfusion with strong right radial pulse, no lower extremity edema  Abdominal: Soft, No tenderness, No rebound or guarding.     Musculoskeletal: no deformity or malalignment   Integument: Warm, Dry, No erythema, No rash.   Neurologic: Alert & oriented x 3, no facial droop, answers questions appropriately, tongue protrudes midline, symmetrical strength in upper lower extremities, no pronator drift, intact sensation to touch in upper lower extremities bilaterally, normal coordination  Psychiatric: Affect normal, Cooperative.      LAB:  All pertinent labs reviewed and interpreted.  Results  for orders placed or performed during the hospital encounter of 10/30/23   Glucose by meter   Result Value Ref Range    GLUCOSE BY METER POCT 169 (H) 70 - 99 mg/dL       RADIOLOGY:  Reviewed all pertinent imaging. Please see official radiology report.  CTA Head Neck with Contrast    (Results Pending)   CTA Chest Abdomen Pelvis w Contrast    (Results Pending)       EKG:    Performed at: 2:09 PM    Impression: EKG shows a sinus tachycardia at 104 beats a minute, normal axis, normal HI, QRS, QTc 510 ms, no ST elevation, slight ST depressions in 2 and aVF,      Second EKG was obtained as patient reported recurrence of chest pain.  His EKG was obtained at 1654.    EKG shows sinus tachycardia 102 beats a minute,  borderline left axis deviation, narrow QRS, QTc 508 ms, some nonspecific ST changes in lead II and aVF without any significant change in compared to prior.  No ST elevation.    I have independently reviewed and interpreted the EKG(s) documented above.    PROCEDURES:   None        I, Seema Bentley, am serving as a scribe to document services personally performed by Dawson Arredondo MD based on my observation and the provider's statements to me. I, Dawson Arredondo MD, attest that Seema Bentley is acting in a scribe capacity, has observed my performance of the services and has documented them in accordance with my direction.    Dawson Arredondo MD  [facility] EMERGENCY DEPARTMENT  [address] [place] [phone]  [phone]      Dawson Arredondo MD  10/30/23 2010

## 2023-10-30 NOTE — H&P
[facility]    History and Physical - Hospitalist Service       Date of Admission:  10/30/2023    Assessment & Plan      Amalia Harris is a 73 year old female admitted on 10/30/2023. She has a h/o DM-II, CHF, HLD, obesity presented with malaise and lightheadedness. Otherwise, no chest pain, shortness of breath, leg swelling, palpitation. In the ED, She had one episode of vomiting and became hypoxic prior to going to CT scan. CTA chest showed no PE but ground glass and septal thickening in the lungs, suggesting pulmonary edema. Of note, she received a bolus for suspected postural hypotension for dizziness earlier.  CT head and CTA head/neck is unremarkable.  She received Lasix 40 mg once.  Troponin 16 and 12 proBNP 492.  Dizziness improved, patient has persistent hypoxia. patient will be admitted for acute hypoxic respiratory failure    Acute hypoxic respiratory failure  Possible aspiration pneumonitis, acute  ?Pulmonary edema  HFpEF  --Pulse ox. Oxygen. Wean as able  -- Continue Lasix. Monitor electrolytes and renal function. I/O  --proBNP low in obese pt  --Echo  -- bronchodilators  -- No antibiotics for now. Monitor and start as indicated.  -- COVID and flu negative  -- Viral pcr    DM-II  -- accuchecks, sliding scale, I;C, hypoglycemia protocol  -- Hba1c 6.6    HLD  --c/w PTA lovastatin    Essential hypertension  --c/w PTA losratan  --IV hydralazin prn  --monitor vital signs per protocol    Obese  --BMI: 33.86         Observation Goals: -diagnostic tests and consults completed and resulted, -vital signs normal or at patient baseline, Nurse to notify provider when observation goals have been met and patient is ready for discharge.  Diet: Combination Diet Moderate Consistent Carb (60 g CHO per Meal) Diet    DVT Prophylaxis: Pneumatic Compression Devices  Cavazos Catheter: Not present  Lines: None     Cardiac Monitoring: ACTIVE order. Indication: Acute decompensated heart failure (48 hours)  Code  Status: Full Code    Clinically Significant Risk Factors Present on Admission                  # Hypertension: Noted on problem list     # DMII: A1C = 6.6 % (Ref range: <5.7 %) within past 6 months               Disposition Plan      Expected Discharge Date: 10/31/2023                  Mali Alexandre MD  Hospitalist Service  [facility]  Securely message with [employer] (more info)  Text page via [employer] Paging/Directory     ______________________________________________________________________    Chief Complaint   Dizziness, malaise    History is obtained from the patient, son and daughter by the bedside    History of Present Illness   Amalia Harris is a 73 year old female who  has a h/o DM-II, CHF, HLD, obesity presented with malaise and lightheadedness. Otherwise, no chest pain, shortness of breath, leg swelling, palpitation. In the ED, She had one episode of vomiting and became hypoxic prior to going to CT scan. CTA chest showed no PE but ground glass and septal thickening in the lungs, suggesting pulmonary edema. Of note, she received a bolus for suspected postural hypotension for dizziness earlier.  CT head and CTA head/neck is unremarkable.  She received Lasix 40 mg once.  Troponin 16 and 12 proBNP 492.  Dizziness improved, patient has persistent hypoxia. patient will be admitted for acute hypoxic respiratory failure      Past Medical History    Past Medical History:   Diagnosis Date    Guaiac positive stools 6/6/2016    Hyperlipidemia LDL goal <130 3/23/2011    Hypertension     Major depression 3/23/2011    Nonsenile cataract     Osteoarthritis 3/23/2011    Severe obesity (BMI 35.0-39.9) with comorbidity (H) 8/27/2014    HTN, diabetes       Past Surgical History   Past Surgical History:   Procedure Laterality Date    CHOLECYSTECTOMY      COLONOSCOPY WITH CO2 INSUFFLATION N/A 02/10/2021    Procedure: COLONOSCOPY, WITH CO2 INSUFFLATION;  Surgeon: Loco Andersen MD;  Location:  OR     GALLBLADDER SURGERY  1986    PHACOEMULSIFICATION WITH STANDARD INTRAOCULAR LENS IMPLANT Right 2019    Procedure: RIGHT PHACOEMULSIFICATION, CATARACT, WITH STANDARD IOL INSERTION;  Surgeon: Robel Clancy MD;  Location: [facility] OR    PHACOEMULSIFICATION WITH STANDARD INTRAOCULAR LENS IMPLANT Left 2019    Procedure: LEFT PHACOEMULSIFICATION, CATARACT, WITH STANDARD IOL INSERTION;  Surgeon: Robel Clancy MD;  Location: [facility] OR    [place] CYSTO/URETERO W/LITHOTRIPSY &INDWELL STENT INSRT Right 2017    Procedure: CYSTOSCOPY, RIGHT URETEROSCOPY LASER LITHOTRIPSY STENT INSERTION;  Surgeon: Dominguez Stoner MD;  Location[facility];  Service: Urology       Prior to Admission Medications   Prior to Admission Medications   Prescriptions Last Dose Informant Patient Reported? Taking?   blood glucose (FREESTYLE TEST STRIPS) test strip n/a at n/a  No No   Si strip by In Vitro route daily   blood glucose (NO BRAND SPECIFIED) lancets standard n/a at n/a  No No   Sig: Use to test blood sugar 1 times daily or as directed.   blood glucose monitoring (ACCU-CHEK FASTCLIX) lancets n/a at n/a  Yes No   Sig: U TO TEST D UTD   blood glucose monitoring (NO BRAND SPECIFIED) meter device kit n/a at n/a  No No   Sig: Use to test blood sugar 1 times daily or as directed.   lancets 28G MISC n/a at n/a  Yes No   Si each by Other route   losartan (COZAAR) 50 MG tablet 10/30/2023 at am  No Yes   Sig: Take 1 tablet (50 mg) by mouth daily for blood pressure and heart.   lovastatin (MEVACOR) 40 MG tablet 10/29/2023 at pm  No Yes   Sig: Take 1 tablet (40 mg) by mouth every evening for cholesterol.   metFORMIN (GLUCOPHAGE XR) 500 MG 24 hr tablet 10/30/2023 at am  Yes Yes   Sig: Take 1,000 mg by mouth 2 times daily (with meals)   potassium citrate (UROCIT-K) 10 MEQ (1080 MG) CR tablet 10/30/2023 at am  No Yes   Sig: TAKE 3 TABLETS BY MOUTH TWICE DAILY      Facility-Administered Medications: None        Review of Systems     The 10 point Review of Systems is negative other than noted in the HPI or here.      Physical Exam   Vital Signs: Temp: 98.6  F (37  C)   BP: 125/59 Pulse: 112   Resp: 28 SpO2: 94 % O2 Device: None (Room air) Oxygen Delivery: 2 LPM  Weight: 162 lbs 0 oz    GEN: Alert and oriented. Not in acute distress.  HEENT: Atraumatic, mucous membrane- moist and pink.  Chest: b/l wheezes and basal crepitations  CVS: S1S2 regular.   Abdomen: Soft. Non-tender, non-distended. No organomegaly. No guarding or rigidity. Bowel sounds active.   Extremities: No pedal edema.  CNS: No involuntary movements.  Skin: no cyanosis or clubbing.     Medical Decision Making             Data

## 2023-10-31 ENCOUNTER — APPOINTMENT (OUTPATIENT)
Dept: CARDIOLOGY | Facility: HOSPITAL | Age: 73
End: 2023-10-31
Attending: STUDENT IN AN ORGANIZED HEALTH CARE EDUCATION/TRAINING PROGRAM
Payer: MEDICARE

## 2023-10-31 LAB
ANION GAP SERPL CALCULATED.3IONS-SCNC: 10 MMOL/L (ref 7–15)
BASE EXCESS BLDV CALC-SCNC: 2.1 MMOL/L
BASOPHILS # BLD AUTO: 0 10E3/UL (ref 0–0.2)
BASOPHILS NFR BLD AUTO: 0 %
BUN SERPL-MCNC: 17.3 MG/DL (ref 8–23)
C PNEUM DNA SPEC QL NAA+PROBE: NOT DETECTED
CALCIUM SERPL-MCNC: 9.6 MG/DL (ref 8.8–10.2)
CHLORIDE SERPL-SCNC: 98 MMOL/L (ref 98–107)
CREAT SERPL-MCNC: 0.89 MG/DL (ref 0.51–0.95)
CRP SERPL-MCNC: <3 MG/L
DEPRECATED HCO3 PLAS-SCNC: 27 MMOL/L (ref 22–29)
EGFRCR SERPLBLD CKD-EPI 2021: 69 ML/MIN/1.73M2
EOSINOPHIL # BLD AUTO: 0.1 10E3/UL (ref 0–0.7)
EOSINOPHIL NFR BLD AUTO: 1 %
ERYTHROCYTE [DISTWIDTH] IN BLOOD BY AUTOMATED COUNT: 13.1 % (ref 10–15)
FLUAV H1 2009 PAND RNA SPEC QL NAA+PROBE: NOT DETECTED
FLUAV H1 RNA SPEC QL NAA+PROBE: NOT DETECTED
FLUAV H3 RNA SPEC QL NAA+PROBE: NOT DETECTED
FLUAV RNA SPEC QL NAA+PROBE: NOT DETECTED
FLUBV RNA SPEC QL NAA+PROBE: NOT DETECTED
GLUCOSE BLDC GLUCOMTR-MCNC: 116 MG/DL (ref 70–99)
GLUCOSE BLDC GLUCOMTR-MCNC: 116 MG/DL (ref 70–99)
GLUCOSE BLDC GLUCOMTR-MCNC: 131 MG/DL (ref 70–99)
GLUCOSE BLDC GLUCOMTR-MCNC: 141 MG/DL (ref 70–99)
GLUCOSE BLDC GLUCOMTR-MCNC: 199 MG/DL (ref 70–99)
GLUCOSE SERPL-MCNC: 109 MG/DL (ref 70–99)
HADV DNA SPEC QL NAA+PROBE: NOT DETECTED
HCO3 BLDV-SCNC: 27 MMOL/L (ref 24–30)
HCOV PNL SPEC NAA+PROBE: NOT DETECTED
HCT VFR BLD AUTO: 33 % (ref 35–47)
HGB BLD-MCNC: 10.6 G/DL (ref 11.7–15.7)
HMPV RNA SPEC QL NAA+PROBE: NOT DETECTED
HPIV1 RNA SPEC QL NAA+PROBE: NOT DETECTED
HPIV2 RNA SPEC QL NAA+PROBE: NOT DETECTED
HPIV3 RNA SPEC QL NAA+PROBE: NOT DETECTED
HPIV4 RNA SPEC QL NAA+PROBE: NOT DETECTED
IMM GRANULOCYTES # BLD: 0.1 10E3/UL
IMM GRANULOCYTES NFR BLD: 1 %
LVEF ECHO: NORMAL
LYMPHOCYTES # BLD AUTO: 1.5 10E3/UL (ref 0.8–5.3)
LYMPHOCYTES NFR BLD AUTO: 14 %
M PNEUMO DNA SPEC QL NAA+PROBE: NOT DETECTED
MCH RBC QN AUTO: 29.1 PG (ref 26.5–33)
MCHC RBC AUTO-ENTMCNC: 32.1 G/DL (ref 31.5–36.5)
MCV RBC AUTO: 91 FL (ref 78–100)
MONOCYTES # BLD AUTO: 1.1 10E3/UL (ref 0–1.3)
MONOCYTES NFR BLD AUTO: 11 %
NEUTROPHILS # BLD AUTO: 7.5 10E3/UL (ref 1.6–8.3)
NEUTROPHILS NFR BLD AUTO: 73 %
NRBC # BLD AUTO: 0 10E3/UL
NRBC BLD AUTO-RTO: 0 /100
OXYHGB MFR BLDV: 70 % (ref 70–75)
PCO2 BLDV: 42 MM HG (ref 35–50)
PH BLDV: 7.41 [PH] (ref 7.35–7.45)
PLATELET # BLD AUTO: 254 10E3/UL (ref 150–450)
PO2 BLDV: 37 MM HG (ref 25–47)
POTASSIUM SERPL-SCNC: 4.1 MMOL/L (ref 3.4–5.3)
PROCALCITONIN SERPL IA-MCNC: 0.1 NG/ML
RBC # BLD AUTO: 3.64 10E6/UL (ref 3.8–5.2)
RSV RNA SPEC QL NAA+PROBE: NOT DETECTED
RSV RNA SPEC QL NAA+PROBE: NOT DETECTED
RV+EV RNA SPEC QL NAA+PROBE: NOT DETECTED
SAO2 % BLDV: 71.1 % (ref 70–75)
SODIUM SERPL-SCNC: 135 MMOL/L (ref 135–145)
WBC # BLD AUTO: 10.3 10E3/UL (ref 4–11)

## 2023-10-31 PROCEDURE — 250N000013 HC RX MED GY IP 250 OP 250 PS 637: Performed by: INTERNAL MEDICINE

## 2023-10-31 PROCEDURE — 255N000002 HC RX 255 OP 636: Performed by: INTERNAL MEDICINE

## 2023-10-31 PROCEDURE — 93306 TTE W/DOPPLER COMPLETE: CPT | Mod: 26 | Performed by: INTERNAL MEDICINE

## 2023-10-31 PROCEDURE — 99222 1ST HOSP IP/OBS MODERATE 55: CPT | Performed by: INTERNAL MEDICINE

## 2023-10-31 PROCEDURE — 85025 COMPLETE CBC W/AUTO DIFF WBC: CPT | Performed by: STUDENT IN AN ORGANIZED HEALTH CARE EDUCATION/TRAINING PROGRAM

## 2023-10-31 PROCEDURE — 84145 PROCALCITONIN (PCT): CPT | Performed by: INTERNAL MEDICINE

## 2023-10-31 PROCEDURE — G0378 HOSPITAL OBSERVATION PER HR: HCPCS

## 2023-10-31 PROCEDURE — 250N000013 HC RX MED GY IP 250 OP 250 PS 637: Performed by: STUDENT IN AN ORGANIZED HEALTH CARE EDUCATION/TRAINING PROGRAM

## 2023-10-31 PROCEDURE — 96367 TX/PROPH/DG ADDL SEQ IV INF: CPT

## 2023-10-31 PROCEDURE — 250N000011 HC RX IP 250 OP 636: Mod: JZ | Performed by: INTERNAL MEDICINE

## 2023-10-31 PROCEDURE — 82805 BLOOD GASES W/O2 SATURATION: CPT | Performed by: INTERNAL MEDICINE

## 2023-10-31 PROCEDURE — 86140 C-REACTIVE PROTEIN: CPT | Performed by: INTERNAL MEDICINE

## 2023-10-31 PROCEDURE — 87486 CHLMYD PNEUM DNA AMP PROBE: CPT | Performed by: STUDENT IN AN ORGANIZED HEALTH CARE EDUCATION/TRAINING PROGRAM

## 2023-10-31 PROCEDURE — 87633 RESP VIRUS 12-25 TARGETS: CPT | Performed by: STUDENT IN AN ORGANIZED HEALTH CARE EDUCATION/TRAINING PROGRAM

## 2023-10-31 PROCEDURE — 80048 BASIC METABOLIC PNL TOTAL CA: CPT | Performed by: STUDENT IN AN ORGANIZED HEALTH CARE EDUCATION/TRAINING PROGRAM

## 2023-10-31 PROCEDURE — 36415 COLL VENOUS BLD VENIPUNCTURE: CPT | Performed by: INTERNAL MEDICINE

## 2023-10-31 PROCEDURE — 99232 SBSQ HOSP IP/OBS MODERATE 35: CPT | Performed by: INTERNAL MEDICINE

## 2023-10-31 PROCEDURE — 36415 COLL VENOUS BLD VENIPUNCTURE: CPT | Performed by: STUDENT IN AN ORGANIZED HEALTH CARE EDUCATION/TRAINING PROGRAM

## 2023-10-31 PROCEDURE — 82962 GLUCOSE BLOOD TEST: CPT

## 2023-10-31 RX ORDER — CALCIUM CARBONATE 500 MG/1
500 TABLET, CHEWABLE ORAL DAILY PRN
Status: DISCONTINUED | OUTPATIENT
Start: 2023-10-31 | End: 2023-11-01 | Stop reason: HOSPADM

## 2023-10-31 RX ORDER — METOPROLOL SUCCINATE 25 MG/1
25 TABLET, EXTENDED RELEASE ORAL DAILY
Status: DISCONTINUED | OUTPATIENT
Start: 2023-10-31 | End: 2023-11-01 | Stop reason: HOSPADM

## 2023-10-31 RX ORDER — PANTOPRAZOLE SODIUM 40 MG/1
40 TABLET, DELAYED RELEASE ORAL
Status: DISCONTINUED | OUTPATIENT
Start: 2023-10-31 | End: 2023-11-01 | Stop reason: HOSPADM

## 2023-10-31 RX ORDER — PIPERACILLIN SODIUM, TAZOBACTAM SODIUM 3; .375 G/15ML; G/15ML
3.38 INJECTION, POWDER, LYOPHILIZED, FOR SOLUTION INTRAVENOUS ONCE
Status: COMPLETED | OUTPATIENT
Start: 2023-10-31 | End: 2023-10-31

## 2023-10-31 RX ORDER — SODIUM CHLORIDE, SODIUM LACTATE, POTASSIUM CHLORIDE, CALCIUM CHLORIDE 600; 310; 30; 20 MG/100ML; MG/100ML; MG/100ML; MG/100ML
INJECTION, SOLUTION INTRAVENOUS CONTINUOUS
Status: DISCONTINUED | OUTPATIENT
Start: 2023-10-31 | End: 2023-10-31

## 2023-10-31 RX ORDER — PIPERACILLIN SODIUM, TAZOBACTAM SODIUM 3; .375 G/15ML; G/15ML
3.38 INJECTION, POWDER, LYOPHILIZED, FOR SOLUTION INTRAVENOUS EVERY 8 HOURS
Status: DISCONTINUED | OUTPATIENT
Start: 2023-10-31 | End: 2023-10-31

## 2023-10-31 RX ORDER — SIMETHICONE 80 MG
80 TABLET,CHEWABLE ORAL EVERY 6 HOURS PRN
Status: DISCONTINUED | OUTPATIENT
Start: 2023-10-31 | End: 2023-11-01 | Stop reason: HOSPADM

## 2023-10-31 RX ORDER — MIDODRINE HYDROCHLORIDE 5 MG/1
5 TABLET ORAL ONCE
Status: COMPLETED | OUTPATIENT
Start: 2023-10-31 | End: 2023-10-31

## 2023-10-31 RX ADMIN — MIDODRINE HYDROCHLORIDE 5 MG: 5 TABLET ORAL at 01:30

## 2023-10-31 RX ADMIN — PANTOPRAZOLE SODIUM 40 MG: 40 TABLET, DELAYED RELEASE ORAL at 12:36

## 2023-10-31 RX ADMIN — PERFLUTREN 2 ML: 6.52 INJECTION, SUSPENSION INTRAVENOUS at 10:35

## 2023-10-31 RX ADMIN — PIPERACILLIN AND TAZOBACTAM 3.38 G: 3; .375 INJECTION, POWDER, LYOPHILIZED, FOR SOLUTION INTRAVENOUS at 09:50

## 2023-10-31 RX ADMIN — METOPROLOL SUCCINATE 25 MG: 25 TABLET, EXTENDED RELEASE ORAL at 17:38

## 2023-10-31 RX ADMIN — INSULIN ASPART 1 UNITS: 100 INJECTION, SOLUTION INTRAVENOUS; SUBCUTANEOUS at 17:41

## 2023-10-31 RX ADMIN — LOVASTATIN 40 MG: 20 TABLET ORAL at 21:08

## 2023-10-31 RX ADMIN — AMOXICILLIN AND CLAVULANATE POTASSIUM 1 TABLET: 875; 125 TABLET, FILM COATED ORAL at 17:41

## 2023-10-31 RX ADMIN — CALCIUM CARBONATE (ANTACID) CHEW TAB 500 MG 500 MG: 500 CHEW TAB at 11:26

## 2023-10-31 ASSESSMENT — ACTIVITIES OF DAILY LIVING (ADL)
ADLS_ACUITY_SCORE: 35
DEPENDENT_IADLS:: TRANSPORTATION;SHOPPING
ADLS_ACUITY_SCORE: 35
ADLS_ACUITY_SCORE: 31
ADLS_ACUITY_SCORE: 31
ADLS_ACUITY_SCORE: 35

## 2023-10-31 NOTE — CONSULTS
Care Management Initial Consult    General Information  Assessment completed with: Amalia Waller  Type of CM/SW Visit: Initial Assessment    Primary Care Provider verified and updated as needed: Yes   Readmission within the last 30 days:        Reason for Consult: discharge planning  Advance Care Planning:          Communication Assessment  Patient's communication style: spoken language (English or Bilingual)      Cognitive  Cognitive/Neuro/Behavioral: .WDL except        Orientation: oriented x 4        Speech: clear, logical, spontaneous    Living Environment:   People in home: child(asim), adult     Current living Arrangements: condominium      Able to return to prior arrangements: yes       Family/Social Support:  Care provided by: self  Provides care for: no one, unable/limited ability to care for self     Children          Description of Support System: Supportive       Current Resources:   Patient receiving home care services: No     Community Resources: None  Equipment currently used at home:    Supplies currently used at home:      Employment/Financial:  Employment Status: retired        Financial Concerns: none   Referral to Financial Worker: No  Finance Comments: Medicare only, currently here under observation    Does the patient's insurance plan have a 3 day qualifying hospital stay waiver?  Yes     Which insurance plan 3 day waiver is available? [employer]    Will the waiver be used for post-acute placement? Undetermined at this time    Lifestyle & Psychosocial Needs:  Social Determinants of Health     Food Insecurity: Not on file   Depression: Not at risk (1/30/2023)    PHQ-2     PHQ-2 Score: 0   Housing Stability: Not on file   Tobacco Use: Low Risk  (10/30/2023)    Patient History     Smoking Tobacco Use: Never     Smokeless Tobacco Use: Never     Passive Exposure: Not on file   Financial Resource Strain: Not on file   Alcohol Use: Not on file   Transportation Needs: Not on file   Physical Activity: Not  "on file   Interpersonal Safety: Not on file   Stress: Not on file   Social Connections: Not on file     Functional Status:  Prior to admission patient needed assistance:   Dependent ADLs:: Independent  Dependent IADLs:: Transportation, Shopping     Mental Health Status:  Mental Health Status: No Current Concerns       Chemical Dependency Status:  Chemical Dependency Status: No Current Concerns           Values/Beliefs:  Spiritual, Cultural Beliefs, Sabianism Practices, Values that affect care: no          Values/Beliefs Comment: Shamanism    Additional Information:  Met with patient to review observation status billing under Medicare guidelines and provide a copy of the [facility] brochure.  Per patient, she recently moved to her youngest son's home in [facility]. She lives on the first floor of the [facility] and he and his family live on the second floor. She is independent with activities of daily living at baseline but her son and his family assist with instrumental activities of daily living (IADLs) as needed.Of note, patient shared that she has \"an outstanding hospital bill\" from a previous visit and states that she is \"planning to switch to  care\".   Goal is home, no CM needs identified at this time.     Idania Samuel RN    "

## 2023-10-31 NOTE — ED NOTES
Patient with soft blood pressures, SBP in 70s/80s with MAP around 60. Hospitalist notified and Albumin ordered. Will continue to monitor and update as needed.

## 2023-10-31 NOTE — PROGRESS NOTES
[facility]    Medicine Progress Note - Hospitalist Service    Date of Admission:  10/30/2023    Assessment & Plan   Amalia Harris is a 73-year-old woman with history of type 2 diabetes, CHF, hyperlipidemia, and obesity admitted on 10/30/2023 with lightheadedness, vomiting, and hypoxic respiratory failure.  Found to have severe LVOT on echocardiogram.    CT angiogram of the chest, abdomen and pelvis showed groundglass and septal thickening in the lungs, mild airway thickening, pulmonary trunk enlargement and hepatic steatosis. Echocardiogram revealed severe LVOT obstruction with peak gradient of 180 and mean gradient 96 mmHgb, small and hyperdynamic left ventricle, and moderate to severe mitral stenosis.    She became hypotensive after receiving furosemide on admission.  She received IV fluid and also required midodrine. Cardiology consult is pending.         Severe LVOT obstruction  Small hyperdynamic left ventricle  Severe mitral stenosis  History of CHF  NT proBNP is not elevated.  Became hypotensive after receiving IV furosemide.  Echocardiogram revealed severe LVOT obstruction with peak gradient of 180 and mean gradient 96 mmHgb, small and hyperdynamic left ventricle, and moderate to severe mitral stenosis.    Hold IV furosemide for now due to hypotension  Losartan 50 mg daily with hold parameters  Follow-up cardiology consult  Monitor electrolytes and replace as needed  Continue telemetry    Type 2 diabetes  Continue insulin sliding scale  Continue insulin carb coverage 1: 15      Hyperlipidemia  Continue PTA lovastatin    Concern for aspiration pneumonitis  Possible aspiration in the setting of vomiting  Crackles in the left lung base.  Groundglass opacity in the right lung.  Received Zosyn briefly  CRP and WBC are within normal limit. Procalcitonin is not significantly elevated.  Will switch to p.o. Augmentin for 5 days.    Observation Goals: -diagnostic tests and consults completed and  resulted, -vital signs normal or at patient baseline, Nurse to notify provider when observation goals have been met and patient is ready for discharge.  Diet: Combination Diet Moderate Consistent Carb (60 g CHO per Meal) Diet    DVT Prophylaxis: Pneumatic Compression Devices  Cavazos Catheter: Not present  Lines: None     Cardiac Monitoring: ACTIVE order. Indication: Severe LVOT  Code Status: Full Code      Clinically Significant Risk Factors Present on Admission                  # Hypertension: Noted on problem list  # Acute heart failure with preserved ejection fraction: heart failure noted on problem list, last echo with EF >50%, and receiving IV diuretics    # DMII: A1C = 6.6 % (Ref range: <5.7 %) within past 6 months        # Financial/Environmental Concerns: none         Disposition Plan     Expected Discharge Date: 10/31/2023                    Christiano Knapp MD  Hospitalist Service  [facility]  Securely message with [employer] (more info)  Text page via [facility] Paging/Directory   ______________________________________________________________________    Interval History   She complains of abdominal bloating and cramps. Had BM earlier this morning. She states she vomited yesterday     Physical Exam   Vital Signs:     BP: 123/66 Pulse: 82   Resp: 25 SpO2: 95 % O2 Device: None (Room air) Oxygen Delivery: 2 LPM  Weight: 162 lbs 0 oz    General appearance: Awake, Alert, Cooperative, not in any obvious distress and appears stated age   HEENT: Normocephalic, atraumatic, conjunctiva clear without icterus and ears without discharge  Lungs: Crackles in the right lung base.   Cardiovascular: Regular Rate and Rythm, normal apical impulse, normal S1 and S2, no lower extremity edema bilaterally  Abdomen: Soft, vague lower abdominal tenderness, non-distended, active bowel sounds  Skin: Skin color, texture normal and bruising or bleeding. No rashes or lesions over face, neck, arms and legs, turgor  normal.  Musculoskeletal: No bony deformities or joint tenderness. Normal ROM upon flexion & extension.   Neurologic: Alert & Oriented X 3, Facial symmetry preserved and upper & lower extremities moving well with symmetry  Psychiatric: Calm, normal eye contact and normal affect      Medical Decision Making       40 MINUTES SPENT BY ME on the date of service doing chart review, history, exam, documentation & further activities per the note.      Data   Imaging results reviewed over the past 24 hrs:   Recent Results (from the past 24 hour(s))   CTA Head Neck with Contrast    Narrative    EXAM: CTA HEAD NECK W CONTRAST  LOCATION: [facility]  DATE: 10/30/2023    INDICATION: chest, abdominal pain, neck pain, dizziness,  eval for dissection; Neck pain; r o Cervical artery dissection, with or w o trauma; None of the following: Facial pain, headache, Parul syndrome, or neurologic deficit(s) and or stroke  COMPARISON: None.  CONTRAST: isovue 370 75ml  TECHNIQUE: Head and neck CT angiogram with IV contrast. Noncontrast head CT followed by axial helical CT images of the head and neck vessels obtained during the arterial phase of intravenous contrast administration. Axial 2D reconstructed images and   multiplanar 3D MIP reconstructed images of the head and neck vessels were performed by the technologist. Dose reduction techniques were used. All stenosis measurements made according to NASCET criteria unless otherwise specified.    FINDINGS:   NONCONTRAST HEAD CT:   INTRACRANIAL CONTENTS: No intracranial hemorrhage, extraaxial collection, or mass effect.  No CT evidence of acute infarct. Mild presumed chronic small vessel ischemic changes. Mild generalized volume loss. No hydrocephalus.     VISUALIZED ORBITS/SINUSES/MASTOIDS: Prior bilateral cataract surgery. Visualized portions of the orbits are otherwise unremarkable. No paranasal sinus mucosal disease. No middle ear or mastoid effusion.    BONES/SOFT  TISSUES: No acute abnormality.    HEAD CTA:  ANTERIOR CIRCULATION: No occlusion, aneurysm, or high flow vascular malformation. No high-grade stenosis. Atherosclerotic calcifications of the bilateral carotid siphons are present without hemodynamically significant stenosis. Fetal origin of the right   posterior cerebral artery from the anterior circulation.    POSTERIOR CIRCULATION: No stenosis/occlusion, aneurysm, or high flow vascular malformation. Balanced vertebral arteries supply a normal basilar artery.     DURAL VENOUS SINUSES: Not well evaluated on a technical basis.    NECK CTA:  RIGHT CAROTID: No measurable stenosis or dissection.    LEFT CAROTID: No measurable stenosis or dissection.    VERTEBRAL ARTERIES: There are atherosclerotic calcifications producing moderate stenosis of the bilateral vertebral arteries. Vertebral arteries are otherwise widely patent. Balanced vertebral arteries.    AORTIC ARCH: Classic aortic arch anatomy with no significant stenosis at the origin of the great vessels.    NONVASCULAR STRUCTURES: Nonspecific 9 mm enhancing lesion at the superficial lobe right parotid gland.      Impression    IMPRESSION:   HEAD CT:  1.  No CT evidence for acute intracranial process.  2.  Brain atrophy and presumed chronic microvascular ischemic changes as above.    HEAD CTA:   1.  Intracranial atherosclerosis without significant stenosis, aneurysm, or high flow vascular malformation identified.  2.  Variant [place] anatomy as above.    NECK CTA:  1.  No hemodynamically significant stenosis in the carotid arteries.  2.  Focal moderate stenosis of the bilateral V4 segment vertebral arteries.  3.  No evidence for dissection.  4.  Nonspecific 9 mm enhancing focus at the superficial lobe right parotid gland. By definition parotid regions are nonspecific on imaging and differential considerations include both benign and malignant processes.   CTA Chest Abdomen Pelvis w Contrast    Narrative     EXAM: CTA CHEST ABDOMEN PELVIS W CONTRAST  LOCATION: [facility]  DATE: 10/30/2023    INDICATION: Chest, abdominal, neck pain, dizzyness eval for dissection.  COMPARISON: None.  TECHNIQUE: CT angiogram chest abdomen pelvis during arterial phase of injection of IV contrast. 2D and 3D MIP reconstructions were performed by the CT technologist. Dose reduction techniques were used.   CONTRAST: Isovue 370 75ml.    FINDINGS:   CT ANGIOGRAM CHEST, ABDOMEN, AND PELVIS: Nonaneurysmal aorta without dissection. Patent aortic branch vessels.     Pulmonary trunk enlargement measuring 3.4 cm. No pulmonary embolism seen (peripheral arteries are nondiagnostic from motion).    LUNGS AND PLEURA: Motion artifact. Mild bilateral groundglass. Mild airway thickening. Mild septal thickening. Trace pleural fluid. No pneumothorax.    MEDIASTINUM/AXILLAE: Few borderline enlarged mediastinal nodes, likely reactive. Upper normal heart size. Severe mitral annular calcifications.     CORONARY ARTERY CALCIFICATION: Motion artifact.    HEPATOBILIARY: Cholecystectomy. Mild hepatic steatosis.    PANCREAS: Mild pancreatic atrophy.    SPLEEN: Normal.    ADRENAL GLANDS: Normal.    KIDNEYS/BLADDER: Mild bilateral renal cortical lobulation or scarring. No hydronephrosis.    BOWEL: Unremarkable bowel and appendix.    LYMPH NODES: No abdominal or pelvic adenopathy.    PELVIC ORGANS: Unremarkable.    MUSCULOSKELETAL: Bony demineralization.    OTHER: No free fluid or air.       Impression    IMPRESSION:    1.  Groundglass and septal thickening in the lungs, suggesting pulmonary edema. Trace pleural fluid.    2.  Mild airway thickening, likely from edema.    3.  Nonaneurysmal aorta without dissection. Patent aorta branch vessels.    4.  Pulmonary trunk enlargement; correlate for pulmonary hypertension. No pulmonary  embolism identified.    5.  Hepatic steatosis.     Echocardiogram Complete   Result Value    LVEF  > 65%    Narrative     [id number]  [id number]  [id number]  [id number]^FILOMENA^[place] [address]     Name: MARIETTA CAMPBELL  MRN: [id number]  : 1950  Study Date: 10/31/2023 09:52 AM  Age: 73 yrs  Gender: Female  Patient Location: [facility]  Reason For Study: Dyspnea  Ordering Physician: KELVIN BUTT  Performed By: STANLEY     BSA: 1.7 m2  Height: 58 in  Weight: 162 lb  HR: 97  BP: 95/63 mmHg  ______________________________________________________________________________  Procedure  Complete Portable Echo Adult. Definity (NDC #69720-698) given intravenously.  2.0 ml, lot # [id number]. Technically difficult study.Extremely difficult acoustic  windows despite the use of contrast for endcardial border definition.  ______________________________________________________________________________  Interpretation Summary     Severe LVOT obstruction with peak gradient of 180 and mean gradient 96 mmHgb.  The left ventricular cavity is small.  Hyperdynamic left ventricular function  There is moderate to severe mitral annular calcification.  There is moderate to severe mitral stenosis.  The left atrium is severely dilated.  Mild (35-45mmHg) pulmonary hypertension is present.  Normal right ventricle size and systolic function.  ______________________________________________________________________________  Left Ventricle  The left ventricular cavity is small. Left ventricular function is normal.The  ejection fraction is > 65%. There is mild to moderate concentric left  ventricular hypertrophy. Proximal septal thickening is noted. Severe LVOT  obstruction with peak gradient of 180 and mean gradient 96 mmHgb. Hyperdynamic  left ventricular function. Diastolic function not assessed due to significant  mitral annular calcification. No regional wall motion abnormalities noted.     Right Ventricle  Normal right ventricle size and systolic function.     Atria  The left atrium is severely dilated. Right atrial size is  normal. There is no  color Doppler evidence of an atrial shunt.     Mitral Valve  There is moderate to severe mitral annular calcification. There is moderate to  severe mitral stenosis.     Tricuspid Valve  Tricuspid valve leaflets appear normal. There is mild (1+) tricuspid  regurgitation. The right ventricular systolic pressure is approximated at 32.3  mmHg plus the right atrial pressure. Mild (35-45mmHg) pulmonary hypertension  is present.     Aortic Valve  There is mild trileaflet aortic sclerosis.     Pulmonic Valve  The pulmonic valve is not well seen, but is grossly normal. This degree of  valvular regurgitation is within normal limits.     Vessels  The aorta root is normal. Normal size ascending aorta. IVC diameter and  respiratory changes fall into an intermediate range suggesting an RA pressure  of 8 mmHg.     Pericardium  There is no pericardial effusion.     ______________________________________________________________________________  MMode/2D Measurements & Calculations  IVSd: 1.8 cm  LVIDd: 2.7 cm  LVIDs: 1.9 cm  LVPWd: 1.2 cm  FS: 29.1 %     LV mass(C)d: 141.1 grams  LV mass(C)dI: 84.7 grams/m2  Ao root diam: 2.4 cm  asc Aorta Diam: 3.4 cm  LVOT diam: 1.6 cm  LVOT area: 2.0 cm2  Ao root diam index Ht(cm/m): 1.6  Ao root diam index BSA (cm/m2): 1.4  Asc Ao diam index BSA (cm/m2): 2.0  Asc Ao diam index Ht(cm/m): 2.3  LA Volume (BP): 117.0 ml  LA Volume Index (BP): 70.1 ml/m2     LA Volume Indexed (AL/bp): 76.3 ml/m2  RWT: 0.92  TAPSE: 1.4 cm     Doppler Measurements & Calculations  MV E max katie: 156.0 cm/sec  MV A max katie: 215.0 cm/sec  MV E/A: 0.73  MV max P.7 mmHg  MV mean P.2 mmHg  MV V2 VTI: 39.2 cm  MV dec slope: 1601 cm/sec2  MV dec time: 0.10 sec  Ao V2 max: 298.2 cm/sec  Ao max P.0 mmHg  Ao V2 mean: 218.0 cm/sec  Ao mean P.0 mmHg  Ao V2 VTI: 48.3 cm     PA V2 max: 141.0 cm/sec  PA max P.0 mmHg  PA acc time: 0.05 sec  TR max katie: 284.0 cm/sec  TR max P.3  mmHg  Medial E/e': 43.5  RV S Dante: 12.5 cm/sec     ______________________________________________________________________________  Report approved by: Kobe Johns 10/31/2023 11:10 AM

## 2023-10-31 NOTE — MEDICATION SCRIBE - ADMISSION MEDICATION HISTORY
Medication Scribe Admission Medication History    Admission medication history is complete. The information provided in this note is only as accurate as the sources available at the time of the update.    Information Source(s): Patient and CareEverywhere/SureScripts via in-person    Pertinent Information: pt states when she took metformin made her toes go numb   Pt states no longer on flexepril, pepcid, patanol  Changes made to PTA medication list:  Added: None  Deleted: flexepril, pepcid, patanol  Changed: None    Medication Affordability:  Not including over the counter (OTC) medications, was there a time in the past 3 months when you did not take your medications as prescribed because of cost?: No    Allergies reviewed with patient and updates made in EHR: yes    Medication History Completed By: GABO MURRAY 10/30/2023 7:16 PM    PTA Med List   Medication Sig Last Dose    losartan (COZAAR) 50 MG tablet Take 1 tablet (50 mg) by mouth daily for blood pressure and heart. 10/30/2023 at am    lovastatin (MEVACOR) 40 MG tablet Take 1 tablet (40 mg) by mouth every evening for cholesterol. 10/29/2023 at pm    metFORMIN (GLUCOPHAGE XR) 500 MG 24 hr tablet Take 1,000 mg by mouth 2 times daily (with meals) 10/30/2023 at am    potassium citrate (UROCIT-K) 10 MEQ (1080 MG) CR tablet TAKE 3 TABLETS BY MOUTH TWICE DAILY 10/30/2023 at am

## 2023-10-31 NOTE — PROGRESS NOTES
[facility] Treatment Plan    Patient Score: 6  Patient Acuity: 4     Clinical Indication for Therapy: CHF    Therapy Ordered: Duo QID    Assessment Summary: Patient is a 72 yo female with a pmh of CHF who is admitted for dizziness and chest pain.  Patient is on RA, SPO2 93-94, lung sounds are crackles, non productive cough.  Chest CT shows bilateral ground glass. Patient given 1 neb treatment with no change in symptoms.  Will change Duo QID to PRN and RT will follow.     Jose Antonio Joseph, RT  10/30/2023

## 2023-10-31 NOTE — CONSULTS
Cardiology Consult Note    Thank you, Dr. Knapp, for asking the [facility] Heart Care team to see Amalia Harris in consultation at [facility] ED to evaluate dynamic left ventricular outflow tract obstruction.      Assessment:   1.  Dynamic left ventricular outflow tract obstruction, likely due to hypertrophic cardiomyopathy and potentially systolic anterior motion of the mitral valve although this is not commented upon in the echocardiogram report.  Potential factors that worsen the gradient would be volume depletion with diuretic therapy as well as vasodilator therapy.  Medical therapy includes use of beta-blocker such as metoprolol succinate or calcium channel blocker such as verapamil or diltiazem.  Would favor discontinuation of losartan and institution of low-dose metoprolol succinate.  2.  Moderate to moderately severe mitral valve stenosis.  At this point would favor medical management with above medication changes.  3.  Acute hypoxic respiratory failure, possibly due to aspiration pneumonia.  4.  Acute dizziness, nausea and vomiting.  This is suggestive of possible vertigo.  She has had no recurrence since yesterday morning.  Would hold on meclizine at this point.     Plan:   1.  Avoid diuretics and vasodilator therapy in the absence of acute CHF exacerbation  2.  Trial of low-dose beta-blocker if blood pressure allows to minimize heart rate and contractility.    Primary cardiologist: None     Current History:   Ms. Amalia Harris is a 73 year old female with essential hypertension, hyperlipidemia, depression, morbid obesity who presented to the ED yesterday with complaints of malaise and dizziness.  Patient reported dizziness as a feeling of the room spinning.  Symptoms exacerbated by standing up.  Also noted some abdominal, chest and neck discomfort as well as a headache.  In the ED, was thought to have orthostatic hypotension and was given a bolus of 1 L of lactated Ringer's.  Thereafter,  laboratories came back showing no signs of infection, anemia.  She was sent for a CT to evaluate for possible aortic valve disease and came back hypoxic after having an episode of vomiting.  Concern was that she might have heart failure so was given a single dose of IV Lasix in the ED and subsequently admitted.  An echocardiogram was performed today demonstrating evidence of eccentric left ventricular hypertrophy with severe left ventricular outflow tract obstruction as well as moderate to severe mitral stenosis prompting consultation.    Past Medical History:     Past Medical History:   Diagnosis Date    Guaiac positive stools 6/6/2016    Hyperlipidemia LDL goal <130 3/23/2011    Hypertension     Major depression 3/23/2011    Nonsenile cataract     Osteoarthritis 3/23/2011    Severe obesity (BMI 35.0-39.9) with comorbidity (H) 8/27/2014    HTN, diabetes       Past Surgical History:     Past Surgical History:   Procedure Laterality Date    CHOLECYSTECTOMY      COLONOSCOPY WITH CO2 INSUFFLATION N/A 02/10/2021    Procedure: COLONOSCOPY, WITH CO2 INSUFFLATION;  Surgeon: Loco Andersen MD;  Location: [facility] OR    GALLBLADDER SURGERY  1986    PHACOEMULSIFICATION WITH STANDARD INTRAOCULAR LENS IMPLANT Right 11/11/2019    Procedure: RIGHT PHACOEMULSIFICATION, CATARACT, WITH STANDARD IOL INSERTION;  Surgeon: Robel Clancy MD;  Location: [facility] OR    PHACOEMULSIFICATION WITH STANDARD INTRAOCULAR LENS IMPLANT Left 11/25/2019    Procedure: LEFT PHACOEMULSIFICATION, CATARACT, WITH STANDARD IOL INSERTION;  Surgeon: Robel Clancy MD;  Location: [facility] OR    HI CYSTO/URETERO W/LITHOTRIPSY &INDWELL STENT INSRT Right 09/29/2017    Procedure: CYSTOSCOPY, RIGHT URETEROSCOPY LASER LITHOTRIPSY STENT INSERTION;  Surgeon: Dominguez Stoner MD;  Location: [facility] OR;  Service: Urology       Family History:     Family History   Problem Relation Age of Onset    Respiratory Father     Hyperlipidemia Father     Hyperlipidemia Sister      Cancer Brother         throat or esophageal (alcohol)    Hypertension No family hx of     Cerebrovascular Disease No family hx of     Coronary Artery Disease No family hx of     Diabetes No family hx of     Anesthesia Reaction No family hx of     Bleeding Disorder No family hx of     Thrombophilia No family hx of     Asthma Father        Social History:    reports that she has never smoked. She has never used smokeless tobacco. She reports that she does not drink alcohol and does not use drugs.    Meds:     Current Facility-Administered Medications:     acetaminophen (TYLENOL) tablet 650 mg, 650 mg, Oral, Q6H PRN **OR** acetaminophen (TYLENOL) Suppository 650 mg, 650 mg, Rectal, Q6H PRN, Mali Alexandre MD    albuterol (PROVENTIL HFA/VENTOLIN HFA) inhaler, 2 puff, Inhalation, Q6H PRN, Mali Alexandre MD    amoxicillin-clavulanate (AUGMENTIN) 875-125 MG per tablet 1 tablet, 1 tablet, Oral, Q12H [facility] (08/20), Christiano Knapp MD    calcium carbonate (TUMS) chewable tablet 500 mg, 500 mg, Oral, Daily PRN, Christiano Knapp MD, 500 mg at 10/31/23 1126    glucose gel 15-30 g, 15-30 g, Oral, Q15 Min PRN **OR** dextrose 50 % injection 25-50 mL, 25-50 mL, Intravenous, Q15 Min PRN **OR** glucagon injection 1 mg, 1 mg, Subcutaneous, Q15 Min PRN, Mali Alexandre MD    [Held by provider] furosemide (LASIX) injection 40 mg, 40 mg, Intravenous, Q12H, Mali Alexandre MD    insulin aspart (NovoLOG) injection (RAPID ACTING), 1-7 Units, Subcutaneous, TID Bettie SANTANA Kalkidan, MD, 1 Units at 10/30/23 2144    insulin aspart (NovoLOG) injection (RAPID ACTING), 1-5 Units, Subcutaneous, At Bedtime, Mali Alexandre MD    insulin aspart (NovoLOG) injection (RAPID ACTING), , Subcutaneous, TID Bettie SANTANA Kalkidan, MD, 1 Units at 10/30/23 2141    ipratropium - albuterol 0.5 mg/2.5 mg/3 mL (DUONEB) neb solution 3 mL, 3 mL, Nebulization, Q4H PRN, Mali Alexandre MD    lovastatin (MEVACOR) tablet 40 mg,  40 mg, Oral, QPM, Mali Alexandre MD, 40 mg at 10/30/23 2258    melatonin tablet 1 mg, 1 mg, Oral, At Bedtime PRN, Mali Alexandre MD    ondansetron (ZOFRAN ODT) ODT tab 4 mg, 4 mg, Oral, Q6H PRN **OR** ondansetron (ZOFRAN) injection 4 mg, 4 mg, Intravenous, Q6H PRN, Mali Alexandre MD    pantoprazole (PROTONIX) EC tablet 40 mg, 40 mg, Oral, QAM AC, Christiano Knapp MD, 40 mg at 10/31/23 1236    senna-docusate (SENOKOT-S/PERICOLACE) 8.6-50 MG per tablet 1 tablet, 1 tablet, Oral, BID PRN **OR** senna-docusate (SENOKOT-S/PERICOLACE) 8.6-50 MG per tablet 2 tablet, 2 tablet, Oral, BID PRN, Mali Alexandre MD    simethicone (MYLICON) chewable tablet 80 mg, 80 mg, Oral, Q6H PRN, Christiano Knapp MD    Current Outpatient Medications:     losartan (COZAAR) 50 MG tablet, Take 1 tablet (50 mg) by mouth daily for blood pressure and heart., Disp: 30 tablet, Rfl: 0    lovastatin (MEVACOR) 40 MG tablet, Take 1 tablet (40 mg) by mouth every evening for cholesterol., Disp: 90 tablet, Rfl: 1    metFORMIN (GLUCOPHAGE XR) 500 MG 24 hr tablet, Take 1,000 mg by mouth 2 times daily (with meals), Disp: , Rfl:     potassium citrate (UROCIT-K) 10 MEQ (1080 MG) CR tablet, TAKE 3 TABLETS BY MOUTH TWICE DAILY, Disp: 540 tablet, Rfl: 0    blood glucose (FREESTYLE TEST STRIPS) test strip, 1 strip by In Vitro route daily, Disp: 100 strip, Rfl: 3    blood glucose (NO BRAND SPECIFIED) lancets standard, Use to test blood sugar 1 times daily or as directed., Disp: 100 each, Rfl: 3    blood glucose monitoring (ACCU-CHEK FASTCLIX) lancets, U TO TEST D UTD, Disp: , Rfl:     blood glucose monitoring (NO BRAND SPECIFIED) meter device kit, Use to test blood sugar 1 times daily or as directed., Disp: 1 kit, Rfl: 0    lancets 28G MISC, 1 each by Other route, Disp: , Rfl:    amoxicillin-clavulanate  1 tablet Oral Q12H [facility] (08/20)    [Held by provider] furosemide  40 mg Intravenous Q12H    insulin aspart  1-7 Units Subcutaneous TID  AC    insulin aspart  1-5 Units Subcutaneous At Bedtime    insulin aspart   Subcutaneous TID AC    lovastatin  40 mg Oral QPM    pantoprazole  40 mg Oral QAM AC       Allergies:   No clinical screening - see comments    Review of Systems:   A 12 point comprehensive review of systems was negative except as noted in the current history.    Objective:      Physical Exam  Body mass index is 33.86 kg/m .  /70   Pulse 90   Temp 98.6  F (37  C)   Resp 17   Wt 73.5 kg (162 lb)   SpO2 98%   BMI 33.86 kg/m      General Appearance:   Well-developed, well-nourished Hmong female in no acute distress   HEENT:  Normocephalic, atraumatic.  Sclera nonicteric.  Mucous membranes moist.   Neck: No jugular venous distention or carotid bruits    Chest: symmetric with normal AP diameter   Lungs:   Clear to auscultation bilaterally   Cardiovascular:   Regular rate and rhythm with occasional ectopic beat.  S1, S2 normal.  Harsh 2/6 systolic murmur heard along the left sternal border.  No appreciable diastolic murmur.   Abdomen:  Obese, soft, nondistended, nontender.  No organomegaly or mass.   Extremities: No peripheral edema, clubbing or cyanosis   Skin: No rash or lesions   Neurologic: Alert and oriented x3.  No gross focal deficit             Cardiographics (personally reviewed)  ECG x2 demonstrates normal sinus rhythm, left ventricular hypertrophy with repolarization abnormality    Imaging (personally reviewed)  Procedure  Complete Portable Echo Adult. Definity (NDC #34509-422) given intravenously.  2.0 ml, lot # [id number]. Technically difficult study.Extremely difficult acoustic  windows despite the use of contrast for endcardial border definition.  ______________________________________________________________________________  Interpretation Summary     Severe LVOT obstruction with peak gradient of 180 and mean gradient 96 mmHgb.  The left ventricular cavity is small.  Hyperdynamic left ventricular function  There is moderate  "to severe mitral annular calcification.  There is moderate to severe mitral stenosis.  The left atrium is severely dilated.  Mild (35-45mmHg) pulmonary hypertension is present.  Normal right ventricle size and systolic function.    Lab Review (personally reviewed all results)  Recent Labs   Lab Test 01/30/23  1303   CHOL 160   HDL 49*   LDL 60   TRIG 256*     Recent Labs   Lab Test 01/30/23  1303 07/25/22  1135 08/02/21  1444   LDL 60 56 144*     Recent Labs   Lab Test 10/31/23  1305 10/31/23  0808 10/31/23  0646   NA  --   --  135   POTASSIUM  --   --  4.1   CHLORIDE  --   --  98   CO2  --   --  27   *   < > 109*   BUN  --   --  17.3   CR  --   --  0.89   GFRESTIMATED  --   --  69   DILLON  --   --  9.6    < > = values in this interval not displayed.     Recent Labs   Lab Test 10/31/23  0646 10/30/23  1444 01/30/23  1303   CR 0.89 0.88 0.81     Recent Labs   Lab Test 10/30/23  1444 01/30/23  1303 07/25/22  1135   A1C 6.6* 6.6* 6.1*          Recent Labs   Lab Test 10/31/23  0646   WBC 10.3   HGB 10.6*   HCT 33.0*   MCV 91        Recent Labs   Lab Test 10/31/23  0646 10/30/23  1444 01/30/23  1303   HGB 10.6* 12.6 12.2    No results for input(s): \"TROPONINI\" in the last 79070 hours.  Recent Labs   Lab Test 10/30/23  1542   NTBNPI 492     Recent Labs   Lab Test 09/12/18  1623   TSH 2.09     No results for input(s): \"INR\" in the last 14256 hours.              "

## 2023-11-01 VITALS
HEART RATE: 74 BPM | OXYGEN SATURATION: 93 % | TEMPERATURE: 98.2 F | HEIGHT: 59 IN | SYSTOLIC BLOOD PRESSURE: 127 MMHG | WEIGHT: 176.15 LBS | RESPIRATION RATE: 18 BRPM | DIASTOLIC BLOOD PRESSURE: 61 MMHG | BODY MASS INDEX: 35.51 KG/M2

## 2023-11-01 LAB
GLUCOSE BLDC GLUCOMTR-MCNC: 124 MG/DL (ref 70–99)
GLUCOSE BLDC GLUCOMTR-MCNC: 129 MG/DL (ref 70–99)

## 2023-11-01 PROCEDURE — 99232 SBSQ HOSP IP/OBS MODERATE 35: CPT | Performed by: INTERNAL MEDICINE

## 2023-11-01 PROCEDURE — 250N000013 HC RX MED GY IP 250 OP 250 PS 637: Performed by: INTERNAL MEDICINE

## 2023-11-01 PROCEDURE — G0378 HOSPITAL OBSERVATION PER HR: HCPCS

## 2023-11-01 PROCEDURE — 99239 HOSP IP/OBS DSCHRG MGMT >30: CPT | Performed by: INTERNAL MEDICINE

## 2023-11-01 PROCEDURE — 82962 GLUCOSE BLOOD TEST: CPT

## 2023-11-01 PROCEDURE — 99207 PR APP CREDIT; MD BILLING SHARED VISIT: CPT | Performed by: INTERNAL MEDICINE

## 2023-11-01 RX ORDER — METOPROLOL SUCCINATE 25 MG/1
25 TABLET, EXTENDED RELEASE ORAL DAILY
Qty: 30 TABLET | Refills: 0 | Status: SHIPPED | OUTPATIENT
Start: 2023-11-02 | End: 2023-12-18

## 2023-11-01 RX ADMIN — METOPROLOL SUCCINATE 25 MG: 25 TABLET, EXTENDED RELEASE ORAL at 08:46

## 2023-11-01 RX ADMIN — AMOXICILLIN AND CLAVULANATE POTASSIUM 1 TABLET: 875; 125 TABLET, FILM COATED ORAL at 08:46

## 2023-11-01 RX ADMIN — PANTOPRAZOLE SODIUM 40 MG: 40 TABLET, DELAYED RELEASE ORAL at 08:46

## 2023-11-01 ASSESSMENT — ACTIVITIES OF DAILY LIVING (ADL)
ADLS_ACUITY_SCORE: 32
ADLS_ACUITY_SCORE: 32
ADLS_ACUITY_SCORE: 31
ADLS_ACUITY_SCORE: 32

## 2023-11-01 NOTE — PLAN OF CARE
"PRIMARY DIAGNOSIS: \"GENERIC\" NURSING  OUTPATIENT/OBSERVATION GOALS TO BE MET BEFORE DISCHARGE:  ADLs back to baseline: Yes    Activity and level of assistance: Up with standby assistance.    Pain status: Pain free.    Return to near baseline physical activity: No, dizziness     Discharge Planner Nurse   Safe discharge environment identified: Yes  Barriers to discharge: Yes       Entered by: Naomie Stewart RN 11/01/2023 6:38 AM     Please review provider order for any additional goals.   Nurse to notify provider when observation goals have been met and patient is ready for discharge.Goal Outcome Evaluation:                        "

## 2023-11-01 NOTE — PLAN OF CARE
Problem: Adult Inpatient Plan of Care  Goal: Absence of Hospital-Acquired Illness or Injury  Intervention: Identify and Manage Fall Risk  Recent Flowsheet Documentation  Taken 10/31/2023 1700 by Selvin Arellano RN  Safety Promotion/Fall Prevention:   assistive device/personal items within reach   clutter free environment maintained   increase visualization of patient   nonskid shoes/slippers when out of bed   room door open   room near nurse's station   safety round/check completed     Problem: Adult Inpatient Plan of Care  Goal: Absence of Hospital-Acquired Illness or Injury  Intervention: Identify and Manage Fall Risk  Recent Flowsheet Documentation  Taken 10/31/2023 1700 by Selvin Arellano, RN  Safety Promotion/Fall Prevention:   assistive device/personal items within reach   clutter free environment maintained   increase visualization of patient   nonskid shoes/slippers when out of bed   room door open   room near nurse's station   safety round/check completed   Goal Outcome Evaluation:       Patient is A/O x4. VSS. She ambulates with SBA to bathroom. She denies pain. Telemetry reading sinus arrhythmia.    Patient takes pills with applesauce due to difficulties swallowing large pills whole.

## 2023-11-01 NOTE — UTILIZATION REVIEW
Concurrent stay review; Secondary Review Determination     Under the authority of the Utilization Management Committee, the utilization review process indicated a secondary review on Amalia Harris.  The review outcome is based on review of the medical records, discussions with staff, and applying clinical experience noted on the date of the review.        (x) Observation Status Appropriate - Concurrent stay review    RATIONALE FOR DETERMINATION   Amalia Harris is a 73 yr old female with DM2, CHF, HLD and obesity who presented with lightheadedness and vomiting with acute hypoxic respiratory failure.  Was able to wean off oxygen despite CT chest with ground glass and septal thickening in lungs with mild airway thickening and pulmonary trunk enlargement.  ECHO with LVOT obstruction with mod/severe mitral stenosis and cardiology consulted.  Recommended no diuretics given hypotension after furosemide requiring IVF and midodrine.  BP stabilized.  Med adjustments of antihypertensives made.  Weaned off oxygen.  Overall improved and anticipate discharge soon.    Patient is clinically improving and there is no clear indication to change patient's status to inpatient. The severity of illness, intensity of service provided, expected LOS and risk for adverse outcome make the care appropriate for observation.    The information on this document is developed by the utilization review team in order for the business office to ensure compliance.  This only denotes the appropriateness of proper admission status and does not reflect the quality of care rendered.         The definitions of Inpatient Status and Observation Status used in making the determination above are those provided in the CMS Coverage Manual, Chapter 1 and Chapter 6, section 70.4.      Sincerely,   Hansa Cardona MD  Utilization Review  Physician Advisor  [facility]

## 2023-11-01 NOTE — PLAN OF CARE
PRIMARY DIAGNOSIS: ACUTE PAIN  OUTPATIENT/OBSERVATION GOALS TO BE MET BEFORE DISCHARGE:  1. Pain Status: Pain free.    2. Return to near baseline physical activity: No    3. Cleared for discharge by consultants (if involved): No    Discharge Planner Nurse   Safe discharge environment identified: Yes  Barriers to discharge: Yes       Entered by: Naomie Stewart RN 11/01/2023 6:39 AM     Please review provider order for any additional goals.   Nurse to notify provider when observation goals have been met and patient is ready for discharge.Goal Outcome Evaluation:

## 2023-11-01 NOTE — PROGRESS NOTES
[facility]    Medicine Progress Note - Hospitalist Service    Date of Admission:  10/30/2023    Assessment & Plan   Amalia Harris is a 73-year-old woman with history of type 2 diabetes, CHF, hyperlipidemia, and obesity admitted on 10/30/2023 with lightheadedness, vomiting, and hypoxic respiratory failure.  Found to have severe LVOT on echocardiogram.    CT angiogram of the chest, abdomen and pelvis showed groundglass and septal thickening in the lungs, mild airway thickening, pulmonary trunk enlargement and hepatic steatosis. Echocardiogram revealed severe LVOT obstruction with peak gradient of 180 and mean gradient 96 mmHgb, small and hyperdynamic left ventricle, and moderate to severe mitral stenosis.    She became hypotensive after receiving furosemide on admission.  She received IV fluid and also required midodrine. Cardiologist recommended low-dose metoprolol succinate, avoidance of vasodilator therapy and diuretics.         Severe LVOT obstruction  Small hyperdynamic left ventricle  Severe mitral stenosis  History of CHF  NT proBNP is not elevated.  Became hypotensive after receiving IV furosemide.  Echocardiogram revealed severe LVOT obstruction with peak gradient of 180 and mean gradient 96 mmHgb, small and hyperdynamic left ventricle, and moderate to severe mitral stenosis.    Avoid vasodilator therapy and diuretics.   Continue metoprolol succinate 25 daily   Monitor electrolytes and replace as needed  Continue telemetry  Cardiology follow up - appreciate assistance     Type 2 diabetes  Continue insulin sliding scale  Continue insulin carb coverage 1:15      Hyperlipidemia  Continue PTA lovastatin    Concern for aspiration pneumonitis  Possible aspiration in the setting of vomiting  Crackles in the left lung base.  Groundglass opacity in the right lung.  Received Zosyn briefly  CRP and WBC are within normal limit. Procalcitonin is not significantly elevated.  Continue p.o. Augmentin - to  "compete 5 days of therapy for.    Observation Goals: -diagnostic tests and consults completed and resulted, -vital signs normal or at patient baseline, Nurse to notify provider when observation goals have been met and patient is ready for discharge.  Diet: Combination Diet Moderate Consistent Carb (60 g CHO per Meal) Diet    DVT Prophylaxis: Pneumatic Compression Devices  Cavazos Catheter: Not present  Lines: None     Cardiac Monitoring: ACTIVE order. Indication: Severe LVOT  Code Status: Full Code      Clinically Significant Risk Factors Present on Admission                  # Hypertension: Noted on problem list  # Acute heart failure with preserved ejection fraction: heart failure noted on problem list, last echo with EF >50%, and receiving IV diuretics    # DMII: A1C = 6.6 % (Ref range: <5.7 %) within past 6 months    # Obesity: Estimated body mass index is 35.61 kg/m  as calculated from the following:    Height as of this encounter: 1.498 m (4' 10.98\").    Weight as of this encounter: 79.9 kg (176 lb 2.4 oz).       # Financial/Environmental Concerns: none         Disposition Plan      Expected Discharge Date: 11/01/2023                    Christiano Knapp MD  Hospitalist Service  [facility]  Securely message with [employer] (more info)  Text page via [facility] Paging/Directory   ______________________________________________________________________    Interval History   No new complaints today.  Lightheadedness has improved.  She states she heard \"squeezing sounds\" from her chest about a month ago. She denies SOB, chest pain or palpitation    Physical Exam   Vital Signs: Temp: 98.2  F (36.8  C) Temp src: Oral BP: 127/61 Pulse: 74   Resp: 18 SpO2: 93 % O2 Device: None (Room air)    Weight: 176 lbs 2.36 oz    General appearance: Awake, Alert, Cooperative, not in any obvious distress and appears stated age   HEENT: Normocephalic, atraumatic, conjunctiva clear without icterus and ears without " discharge  Lungs: Crackles in the right lung base.   Cardiovascular: Regular Rate and Rythm, normal apical impulse, normal S1 and S2, no lower extremity edema bilaterally  Abdomen: Soft, vague lower abdominal tenderness, non-distended, active bowel sounds  Skin: Skin color, texture normal and bruising or bleeding. No rashes or lesions over face, neck, arms and legs, turgor normal.  Musculoskeletal: No bony deformities or joint tenderness. Normal ROM upon flexion & extension.   Neurologic: Alert & Oriented X 3, Facial symmetry preserved and upper & lower extremities moving well with symmetry  Psychiatric: Calm, normal eye contact and normal affect      Medical Decision Making       40 MINUTES SPENT BY ME on the date of service doing chart review, history, exam, documentation & further activities per the note.      Data   Imaging results reviewed over the past 24 hrs:   No results found for this or any previous visit (from the past 24 hour(s)).

## 2023-11-01 NOTE — PROGRESS NOTES
"Care Management Follow Up    Length of Stay (days): 0    Expected Discharge Date: 11/01/2023     Concerns to be Addressed:  discharge planning   Patient plan of care discussed at interdisciplinary rounds: Yes    Anticipated Discharge Disposition:       Anticipated Discharge Services:     Education Provided on the Discharge Plan:  Per Care Team   Patient/Family in Agreement with the Plan:  Yes    Referrals Placed by CM/SW:    Private pay costs discussed: Not applicable    Additional Information:  Chart reviewed.     Cm updates:  No therapy consults     CARDS following pt , per note:  \"1.  Avoid diuretics and vasodilator therapy in the absence of acute CHF exacerbation  2.  Trial of low-dose beta-blocker if blood pressure allows to minimize heart rate and contractility.\"         Social Hx:   \"Per patient, she recently moved to her youngest son's home in [facility]. She lives on the first floor of the [facility] and he and his family live on the second floor. She is independent with activities of daily living at baseline but her son and his family assist with instrumental activities of daily living (IADLs) as needed.Of note, patient shared that she has \"an outstanding hospital bill\" from a previous visit and states that she is \"planning to switch to  care\".   Goal is home, no CM needs identified at this time. \"      Cm will continue to follow plan of care, review recommendations, and assist with any discharge needs anticipated.     Bety Foster RN    Care Management Discharge Note    Discharge Date: 11/01/2023       Discharge Disposition: Home    Discharge Transportation: family or friend will provide    Education Provided on the Discharge Plan:  Per Care Team   Persons Notified of Discharge Plans: Yes  Patient/Family in Agreement with the Plan:      Handoff Referral Completed: Yes    Additional Information:  Final discharge plan is for pt to return home to her youngest son's home with follow up appointments. Family to " transport.       Bety Foster RN

## 2023-11-01 NOTE — PROGRESS NOTES
Cardiology Progress Note    Assessment/Plan:  1.  Severe LV outflow tract obstruction, likely due to small left ventricular cavity size and left ventricular hypertrophy.  Outflow tract obstructions can be aggravated by diuretics and volume depletion which may have precipitated her episode of hypotension yesterday.  Would hold diuretics going forward.  Also recommended switch from losartan to low-dose beta-blocker for treatment of hypertension.  Nothing further needs to be done at this time if asymptomatic.  2.  Moderate to severe mitral valve stenosis secondary to severe mitral annular calcification.  Medical management recommended at this time.  Again beta-blockers should help with minimizing gradient across the mitral valve with lower heart rates.  She clearly will be at risk for the development of atrial fibrillation given left atrial enlargement and should be monitored for this.  3.  Possible aspiration pneumonitis, on oral antibiotic    Okay to discharge home from a cardiac standpoint.    Primary cardiologist: None prior to admission    Subjective:  Voices no complaints of lightheadedness or dizziness.     amoxicillin-clavulanate  1 tablet Oral Q12H [facility] (08/20)    insulin aspart  1-7 Units Subcutaneous TID AC    insulin aspart  1-5 Units Subcutaneous At Bedtime    insulin aspart   Subcutaneous TID AC    lovastatin  40 mg Oral QPM    metoprolol succinate ER  25 mg Oral Daily    pantoprazole  40 mg Oral QAM AC         Objective:   Vital signs in last 24 hours:  Temp:  [98.1  F (36.7  C)-98.8  F (37.1  C)] 98.2  F (36.8  C)  Pulse:  [74-90] 74  Resp:  [17-31] 18  BP: ()/(55-71) 127/61  SpO2:  [91 %-98 %] 93 %  Weight:        Review of Systems:  Negative    Physical Exam:  General appearance: alert, cooperative, no distress   Head: Normocephalic, without obvious abnormality, atraumatic  Neck: no JVD   Lungs: clear to auscultation bilaterally   Heart: Regular rate and rhythm.  S1, S2 normal.  Systolic  "murmur less prominent today.  Extremities: No peripheral edema    Cardiographics (personally reviewed):   No telemetry    Imaging (personally reviewed):   No additional imaging    Lab Results (personally reviewed):     Recent Labs   Lab Test 01/30/23  1303   CHOL 160   HDL 49*   LDL 60   TRIG 256*     Recent Labs   Lab Test 01/30/23  1303 07/25/22  1135 08/02/21  1444   LDL 60 56 144*     Recent Labs   Lab Test 11/01/23  1225 10/31/23  0808 10/31/23  0646   NA  --   --  135   POTASSIUM  --   --  4.1   CHLORIDE  --   --  98   CO2  --   --  27   *   < > 109*   BUN  --   --  17.3   CR  --   --  0.89   GFRESTIMATED  --   --  69   DILLON  --   --  9.6    < > = values in this interval not displayed.     Recent Labs   Lab Test 10/31/23  0646 10/30/23  1444 01/30/23  1303   CR 0.89 0.88 0.81     Recent Labs   Lab Test 10/30/23  1444 01/30/23  1303 07/25/22  1135   A1C 6.6* 6.6* 6.1*          Recent Labs   Lab Test 10/31/23  0646   WBC 10.3   HGB 10.6*   HCT 33.0*   MCV 91        Recent Labs   Lab Test 10/31/23  0646 10/30/23  1444 01/30/23  1303   HGB 10.6* 12.6 12.2    No results for input(s): \"TROPONINI\" in the last 36780 hours.  Recent Labs   Lab Test 10/30/23  1542   NTBNPI 492     Recent Labs   Lab Test 09/12/18  1623   TSH 2.09     No results for input(s): \"INR\" in the last 73667 hours.       Cata Frost MD          "

## 2023-11-01 NOTE — PLAN OF CARE
Goal Outcome Evaluation:       Pt alert and oriented. Up independently. Waiting on pharmacy for discharge this afternoon.

## 2023-11-01 NOTE — PLAN OF CARE
PRIMARY DIAGNOSIS: ACUTE PAIN  OUTPATIENT/OBSERVATION GOALS TO BE MET BEFORE DISCHARGE:  1. Pain Status: Pain free.    2. Return to near baseline physical activity: No    3. Cleared for discharge by consultants (if involved): No    Discharge Planner Nurse   Safe discharge environment identified: Yes  Barriers to discharge: Yes       Entered by: Naomie Stewart RN 11/01/2023 6:39 AM     Please review provider order for any additional goals.   Nurse to notify provider when observation goals have been met and patient is ready for discharge.Goal Outcome Evaluation:   Patient alert and oriented. Denied pain. SBA to bathroom. Patient continent. On RA. Denied any dizziness or nausea. Slept well overnight.

## 2023-11-01 NOTE — DISCHARGE SUMMARY
"[facility]  Hospitalist Discharge Summary      Date of Admission:  10/30/2023  Date of Discharge:  11/1/2023  Discharging Provider: Christiano Knapp MD  Discharge Service: Hospitalist Service    Discharge Diagnoses   Severe LVOT obstruction  Severe mitral stenosis  History of CHF  Aspiration pneumonitis     Clinically Significant Risk Factors     # DMII: A1C = 6.6 % (Ref range: <5.7 %) within past 6 months    # Obesity: Estimated body mass index is 35.61 kg/m  as calculated from the following:    Height as of this encounter: 1.498 m (4' 10.98\").    Weight as of this encounter: 79.9 kg (176 lb 2.4 oz).       Follow-ups Needed After Discharge   Follow-up Appointments     Follow-up and recommended labs and tests       Follow up with primary care provider, Terry Sanchez, within 7 days   for hospital follow- up. Outpatient cardiology follow up per cardiology   service. Continue oral Augmentin for the next 3 days.          Discharge Disposition   Discharged to home  Condition at discharge: Stable    Hospital Course   Amalia Harris is a 73-year-old woman with history of type 2 diabetes, CHF, hyperlipidemia, and obesity admitted on 10/30/2023 with lightheadedness and acute hypoxic respiratory failure possibly secondary to severe LVOT obstruction and aspiration pneumonitis.     CT angiogram of the chest, abdomen and pelvis showed groundglass and septal thickening in the lungs, mild airway thickening, pulmonary trunk enlargement and hepatic steatosis. Echocardiogram revealed severe LVOT obstruction with peak gradient of 180 and mean gradient 96 mmHgb, small and hyperdynamic left ventricle, and moderate to severe mitral stenosis.    She became hypotensive after receiving furosemide on admission.  She received IV fluid and also required midodrine. Cardiologist recommended low-dose metoprolol succinate, as well as avoidance of vasodilator therapy and diuretics. She received antibiotics during hospital " stay and will continue oral Augmentin for additional 3 days after discharge.     Symptoms have improved and patient is clinically stable for discharge at this time.       Consultations This Hospital Stay   CARE MANAGEMENT / SOCIAL WORK IP CONSULT  CARDIOLOGY IP CONSULT    Code Status   Full Code    Time Spent on this Encounter   IChristiano MD, personally saw the patient today and spent greater than 30 minutes discharging this patient.       Christiano Knapp MD  [address] [address]  Phone: [phone]  Fax: [phone]  ______________________________________________________________________    Physical Exam   Vital Signs: Temp: 98.2  F (36.8  C) Temp src: Oral BP: 127/61 Pulse: 74   Resp: 18 SpO2: 93 % O2 Device: None (Room air)    Weight: 176 lbs 2.36 oz    General appearance: Awake, Alert, Cooperative, not in any obvious distress and appears stated age   HEENT: Normocephalic, atraumatic, conjunctiva clear without icterus and ears without discharge  Lungs: Mild crackles in the right lung base.   Cardiovascular: Regular Rate and Rythm, normal apical impulse, normal S1 and S2, no lower extremity edema bilaterally  Abdomen: Soft, vague lower abdominal tenderness, non-distended, active bowel sounds  Skin: Skin color, texture normal and bruising or bleeding. No rashes or lesions over face, neck, arms and legs, turgor normal.  Musculoskeletal: No bony deformities or joint tenderness. Normal ROM upon flexion & extension.   Neurologic: Alert & Oriented X 3, Facial symmetry preserved and upper & lower extremities moving well with symmetry  Psychiatric: Calm, normal eye contact and normal affect         Primary Care Physician   Terry Sanchez    Discharge Orders      Reason for your hospital stay    Severe left ventricular outflow tract obstruction  Moderate to severe mitral stenosis  Aspiration pneumonia     Follow-up and recommended labs and  tests     Follow up with primary care provider, Terry Sanchez, within 7 days for hospital follow- up. Outpatient cardiology follow up per cardiology service. Continue oral Augmentin for the next 3 days.     Activity    Your activity upon discharge: activity as tolerated     Diet    Follow this diet upon discharge: Orders Placed This Encounter      Combination Diet Moderate Consistent Carb (60 g CHO per Meal) Diet       Significant Results and Procedures   Results for orders placed or performed during the hospital encounter of 10/30/23   CTA Head Neck with Contrast    Narrative    EXAM: CTA HEAD NECK W CONTRAST  LOCATION: [facility]  DATE: 10/30/2023    INDICATION: chest, abdominal pain, neck pain, dizziness,  eval for dissection; Neck pain; r o Cervical artery dissection, with or w o trauma; None of the following: Facial pain, headache, Parul syndrome, or neurologic deficit(s) and or stroke  COMPARISON: None.  CONTRAST: isovue 370 75ml  TECHNIQUE: Head and neck CT angiogram with IV contrast. Noncontrast head CT followed by axial helical CT images of the head and neck vessels obtained during the arterial phase of intravenous contrast administration. Axial 2D reconstructed images and   multiplanar 3D MIP reconstructed images of the head and neck vessels were performed by the technologist. Dose reduction techniques were used. All stenosis measurements made according to NASCET criteria unless otherwise specified.    FINDINGS:   NONCONTRAST HEAD CT:   INTRACRANIAL CONTENTS: No intracranial hemorrhage, extraaxial collection, or mass effect.  No CT evidence of acute infarct. Mild presumed chronic small vessel ischemic changes. Mild generalized volume loss. No hydrocephalus.     VISUALIZED ORBITS/SINUSES/MASTOIDS: Prior bilateral cataract surgery. Visualized portions of the orbits are otherwise unremarkable. No paranasal sinus mucosal disease. No middle ear or mastoid effusion.    BONES/SOFT  TISSUES: No acute abnormality.    HEAD CTA:  ANTERIOR CIRCULATION: No occlusion, aneurysm, or high flow vascular malformation. No high-grade stenosis. Atherosclerotic calcifications of the bilateral carotid siphons are present without hemodynamically significant stenosis. Fetal origin of the right   posterior cerebral artery from the anterior circulation.    POSTERIOR CIRCULATION: No stenosis/occlusion, aneurysm, or high flow vascular malformation. Balanced vertebral arteries supply a normal basilar artery.     DURAL VENOUS SINUSES: Not well evaluated on a technical basis.    NECK CTA:  RIGHT CAROTID: No measurable stenosis or dissection.    LEFT CAROTID: No measurable stenosis or dissection.    VERTEBRAL ARTERIES: There are atherosclerotic calcifications producing moderate stenosis of the bilateral vertebral arteries. Vertebral arteries are otherwise widely patent. Balanced vertebral arteries.    AORTIC ARCH: Classic aortic arch anatomy with no significant stenosis at the origin of the great vessels.    NONVASCULAR STRUCTURES: Nonspecific 9 mm enhancing lesion at the superficial lobe right parotid gland.      Impression    IMPRESSION:   HEAD CT:  1.  No CT evidence for acute intracranial process.  2.  Brain atrophy and presumed chronic microvascular ischemic changes as above.    HEAD CTA:   1.  Intracranial atherosclerosis without significant stenosis, aneurysm, or high flow vascular malformation identified.  2.  Variant [place] anatomy as above.    NECK CTA:  1.  No hemodynamically significant stenosis in the carotid arteries.  2.  Focal moderate stenosis of the bilateral V4 segment vertebral arteries.  3.  No evidence for dissection.  4.  Nonspecific 9 mm enhancing focus at the superficial lobe right parotid gland. By definition parotid regions are nonspecific on imaging and differential considerations include both benign and malignant processes.   CTA Chest Abdomen Pelvis w Contrast    Narrative     EXAM: CTA CHEST ABDOMEN PELVIS W CONTRAST  LOCATION: [facility]  DATE: 10/30/2023    INDICATION: Chest, abdominal, neck pain, dizzyness eval for dissection.  COMPARISON: None.  TECHNIQUE: CT angiogram chest abdomen pelvis during arterial phase of injection of IV contrast. 2D and 3D MIP reconstructions were performed by the CT technologist. Dose reduction techniques were used.   CONTRAST: Isovue 370 75ml.    FINDINGS:   CT ANGIOGRAM CHEST, ABDOMEN, AND PELVIS: Nonaneurysmal aorta without dissection. Patent aortic branch vessels.     Pulmonary trunk enlargement measuring 3.4 cm. No pulmonary embolism seen (peripheral arteries are nondiagnostic from motion).    LUNGS AND PLEURA: Motion artifact. Mild bilateral groundglass. Mild airway thickening. Mild septal thickening. Trace pleural fluid. No pneumothorax.    MEDIASTINUM/AXILLAE: Few borderline enlarged mediastinal nodes, likely reactive. Upper normal heart size. Severe mitral annular calcifications.     CORONARY ARTERY CALCIFICATION: Motion artifact.    HEPATOBILIARY: Cholecystectomy. Mild hepatic steatosis.    PANCREAS: Mild pancreatic atrophy.    SPLEEN: Normal.    ADRENAL GLANDS: Normal.    KIDNEYS/BLADDER: Mild bilateral renal cortical lobulation or scarring. No hydronephrosis.    BOWEL: Unremarkable bowel and appendix.    LYMPH NODES: No abdominal or pelvic adenopathy.    PELVIC ORGANS: Unremarkable.    MUSCULOSKELETAL: Bony demineralization.    OTHER: No free fluid or air.       Impression    IMPRESSION:    1.  Groundglass and septal thickening in the lungs, suggesting pulmonary edema. Trace pleural fluid.    2.  Mild airway thickening, likely from edema.    3.  Nonaneurysmal aorta without dissection. Patent aorta branch vessels.    4.  Pulmonary trunk enlargement; correlate for pulmonary hypertension. No pulmonary  embolism identified.    5.  Hepatic steatosis.     Echocardiogram Complete     Value    LVEF  > 65%    Narrative     [id number]  [id number]  [id number]  [id number]^FILOEMNA^[place] [address]     Name: MARIETTA CAMPBELL  MRN: [id number]  : 1950  Study Date: 10/31/2023 09:52 AM  Age: 73 yrs  Gender: Female  Patient Location: [facility]  Reason For Study: Dyspnea  Ordering Physician: KELVIN BUTT  Performed By: STANLEY     BSA: 1.7 m2  Height: 58 in  Weight: 162 lb  HR: 97  BP: 95/63 mmHg  ______________________________________________________________________________  Procedure  Complete Portable Echo Adult. Definity (NDC #85309-671) given intravenously.  2.0 ml, lot # [id number]. Technically difficult study.Extremely difficult acoustic  windows despite the use of contrast for endcardial border definition.  ______________________________________________________________________________  Interpretation Summary     Severe LVOT obstruction with peak gradient of 180 and mean gradient 96 mmHgb.  The left ventricular cavity is small.  Hyperdynamic left ventricular function  There is moderate to severe mitral annular calcification.  There is moderate to severe mitral stenosis.  The left atrium is severely dilated.  Mild (35-45mmHg) pulmonary hypertension is present.  Normal right ventricle size and systolic function.  ______________________________________________________________________________  Left Ventricle  The left ventricular cavity is small. Left ventricular function is normal.The  ejection fraction is > 65%. There is mild to moderate concentric left  ventricular hypertrophy. Proximal septal thickening is noted. Severe LVOT  obstruction with peak gradient of 180 and mean gradient 96 mmHgb. Hyperdynamic  left ventricular function. Diastolic function not assessed due to significant  mitral annular calcification. No regional wall motion abnormalities noted.     Right Ventricle  Normal right ventricle size and systolic function.     Atria  The left atrium is severely dilated. Right atrial size is  normal. There is no  color Doppler evidence of an atrial shunt.     Mitral Valve  There is moderate to severe mitral annular calcification. There is moderate to  severe mitral stenosis.     Tricuspid Valve  Tricuspid valve leaflets appear normal. There is mild (1+) tricuspid  regurgitation. The right ventricular systolic pressure is approximated at 32.3  mmHg plus the right atrial pressure. Mild (35-45mmHg) pulmonary hypertension  is present.     Aortic Valve  There is mild trileaflet aortic sclerosis.     Pulmonic Valve  The pulmonic valve is not well seen, but is grossly normal. This degree of  valvular regurgitation is within normal limits.     Vessels  The aorta root is normal. Normal size ascending aorta. IVC diameter and  respiratory changes fall into an intermediate range suggesting an RA pressure  of 8 mmHg.     Pericardium  There is no pericardial effusion.     ______________________________________________________________________________  MMode/2D Measurements & Calculations  IVSd: 1.8 cm  LVIDd: 2.7 cm  LVIDs: 1.9 cm  LVPWd: 1.2 cm  FS: 29.1 %     LV mass(C)d: 141.1 grams  LV mass(C)dI: 84.7 grams/m2  Ao root diam: 2.4 cm  asc Aorta Diam: 3.4 cm  LVOT diam: 1.6 cm  LVOT area: 2.0 cm2  Ao root diam index Ht(cm/m): 1.6  Ao root diam index BSA (cm/m2): 1.4  Asc Ao diam index BSA (cm/m2): 2.0  Asc Ao diam index Ht(cm/m): 2.3  LA Volume (BP): 117.0 ml  LA Volume Index (BP): 70.1 ml/m2     LA Volume Indexed (AL/bp): 76.3 ml/m2  RWT: 0.92  TAPSE: 1.4 cm     Doppler Measurements & Calculations  MV E max katie: 156.0 cm/sec  MV A max katie: 215.0 cm/sec  MV E/A: 0.73  MV max P.7 mmHg  MV mean P.2 mmHg  MV V2 VTI: 39.2 cm  MV dec slope: 1601 cm/sec2  MV dec time: 0.10 sec  Ao V2 max: 298.2 cm/sec  Ao max P.0 mmHg  Ao V2 mean: 218.0 cm/sec  Ao mean P.0 mmHg  Ao V2 VTI: 48.3 cm     PA V2 max: 141.0 cm/sec  PA max P.0 mmHg  PA acc time: 0.05 sec  TR max katie: 284.0 cm/sec  TR max P.3  mmHg  Medial E/e': 43.5  RV S Dante: 12.5 cm/sec     ______________________________________________________________________________  Report approved by: Kobe Johns 10/31/2023 11:10 AM             Discharge Medications   Current Discharge Medication List        START taking these medications    Details   amoxicillin-clavulanate (AUGMENTIN) 875-125 MG tablet Take 1 tablet by mouth every 12 hours  Qty: 7 tablet, Refills: 0    Associated Diagnoses: Aspiration pneumonia, unspecified aspiration pneumonia type, unspecified laterality, unspecified part of lung (H); Left ventricular outflow tract obstruction      metoprolol succinate ER (TOPROL XL) 25 MG 24 hr tablet Take 1 tablet (25 mg) by mouth daily  Qty: 30 tablet, Refills: 0    Associated Diagnoses: Aspiration pneumonia, unspecified aspiration pneumonia type, unspecified laterality, unspecified part of lung (H); Left ventricular outflow tract obstruction           CONTINUE these medications which have NOT CHANGED    Details   lovastatin (MEVACOR) 40 MG tablet Take 1 tablet (40 mg) by mouth every evening for cholesterol.  Qty: 90 tablet, Refills: 1    Associated Diagnoses: Hyperlipidemia LDL goal <100      metFORMIN (GLUCOPHAGE XR) 500 MG 24 hr tablet Take 1,000 mg by mouth 2 times daily (with meals)      potassium citrate (UROCIT-K) 10 MEQ (1080 MG) CR tablet TAKE 3 TABLETS BY MOUTH TWICE DAILY  Qty: 540 tablet, Refills: 0    Associated Diagnoses: Hypocitraturia      blood glucose (FREESTYLE TEST STRIPS) test strip 1 strip by In Vitro route daily  Qty: 100 strip, Refills: 3    Associated Diagnoses: Type 2 diabetes mellitus with microalbuminuria, without long-term current use of insulin (H)      blood glucose (NO BRAND SPECIFIED) lancets standard Use to test blood sugar 1 times daily or as directed.  Qty: 100 each, Refills: 3    Associated Diagnoses: Type 2 diabetes mellitus with microalbuminuria, without long-term current use of insulin (H)      !! blood  glucose monitoring (ACCU-CHEK FASTCLIX) lancets U TO TEST D UTD      blood glucose monitoring (NO BRAND SPECIFIED) meter device kit Use to test blood sugar 1 times daily or as directed.  Qty: 1 kit, Refills: 0    Associated Diagnoses: Type 2 diabetes mellitus with microalbuminuria, without long-term current use of insulin (H)      !! lancets 28G MISC 1 each by Other route       !! - Potential duplicate medications found. Please discuss with provider.        STOP taking these medications       losartan (COZAAR) 50 MG tablet Comments:   Reason for Stopping:             Allergies   Allergies   Allergen Reactions    No Clinical Screening - See Comments Rash     Tele patches

## 2023-11-01 NOTE — PLAN OF CARE
PRIMARY DIAGNOSIS: PNEUMONIA  OUTPATIENT/OBSERVATION GOALS TO BE MET BEFORE DISCHARGE:  Dyspnea improved and O2 sats >88% on RA or back to baseline O2 levels: Yes   SpO2: 93 %, O2 Device: None (Room air)    Tolerating oral abx or appropriate plans made outpatient infusion: Yes    Vitals signs normal or return to baseline: Yes    Short term supplemental O2 needed with activity at home: No    Tolerate oral intake to maintain hydration: Yes    Return to near baseline physical activity: Yes    Discharge Planner Nurse   Safe discharge environment identified: Yes  Barriers to discharge: No       Entered by: Milka Morelos 11/01/2023 11:05 AM     Please review provider order for any additional goals.   Nurse to notify provider when observation goals have been met and patient is ready for discharge.Goal Outcome Evaluation:       Pt alert and oriented x 4. Up to bathroom and chair with SBA. Pt reported generalized, achy pain 3/10. Strict I/Os documented. Diminished lung sounds with fine crackles in lower lobes. Currently on RA.

## 2023-11-02 ENCOUNTER — PATIENT OUTREACH (OUTPATIENT)
Dept: CARE COORDINATION | Facility: CLINIC | Age: 73
End: 2023-11-02
Payer: MEDICARE

## 2023-11-02 ENCOUNTER — APPOINTMENT (OUTPATIENT)
Dept: INTERPRETER SERVICES | Facility: CLINIC | Age: 73
End: 2023-11-02
Payer: MEDICARE

## 2023-11-02 LAB
ATRIAL RATE - MUSE: 102 BPM
ATRIAL RATE - MUSE: 104 BPM
DIASTOLIC BLOOD PRESSURE - MUSE: 61 MMHG
DIASTOLIC BLOOD PRESSURE - MUSE: 68 MMHG
INTERPRETATION ECG - MUSE: NORMAL
INTERPRETATION ECG - MUSE: NORMAL
P AXIS - MUSE: 50 DEGREES
P AXIS - MUSE: 55 DEGREES
PR INTERVAL - MUSE: 162 MS
PR INTERVAL - MUSE: 170 MS
QRS DURATION - MUSE: 80 MS
QRS DURATION - MUSE: 88 MS
QT - MUSE: 388 MS
QT - MUSE: 390 MS
QTC - MUSE: 508 MS
QTC - MUSE: 510 MS
R AXIS - MUSE: -20 DEGREES
R AXIS - MUSE: -33 DEGREES
SYSTOLIC BLOOD PRESSURE - MUSE: 122 MMHG
SYSTOLIC BLOOD PRESSURE - MUSE: 135 MMHG
T AXIS - MUSE: 77 DEGREES
T AXIS - MUSE: 88 DEGREES
VENTRICULAR RATE- MUSE: 102 BPM
VENTRICULAR RATE- MUSE: 104 BPM

## 2023-11-02 RX ORDER — METFORMIN HCL 500 MG
500 TABLET, EXTENDED RELEASE 24 HR ORAL 2 TIMES DAILY WITH MEALS
Qty: 180 TABLET | Refills: 0 | Status: SHIPPED | OUTPATIENT
Start: 2023-11-02 | End: 2023-12-18

## 2023-11-02 ASSESSMENT — ACTIVITIES OF DAILY LIVING (ADL): DEPENDENT_IADLS:: TRANSPORTATION;SHOPPING

## 2023-11-02 NOTE — TELEPHONE ENCOUNTER
Called and left a vm message for daughter to return our call to schedule an approved sooner appt.  Ysabel Townsend MA  [facility]   Primary Care

## 2023-11-02 NOTE — TELEPHONE ENCOUNTER
Called pt and pt's daughter and lvm to call back and schedule hospital f/up Same day ok'd per Laura

## 2023-11-02 NOTE — TELEPHONE ENCOUNTER
Pts son called and was told that Rx was sent and due to delay in return call there was no earlier appts than the one she has scheduled for 11/06/2023.  Pt will keep this appt.   day(s)

## 2023-11-02 NOTE — TELEPHONE ENCOUNTER
Called and left voicemail messages at both #s and sent a My Chart message to return our call to schedule a sooner appointment.  Ysabel Townsend MA  [facility]   Primary Care     Chief Complaint: Right ureteral obstruction    HPI:   19: To OR for stent placement today.  No changes.  19: 67 yo woman presented with multiple complaints and imaging revealed right hydronephrosis with right pelvic mass and lymphadenopathy.  Percutaneous biopsy results pending.  IVP shows obstruction of right ureter at pelvic brim.  No prior abd/pelvic pain and no exac/rel factors but some lately..  No hematuria.  No urolithiasis.  No urinary bother.  No  history.      Allergies:  Patient has no known allergies.    Medications: see MAR    Review of Systems:  General: No fever, chills, fatigability, or weight loss.  Skin: No rashes, itching, or changes in color or texture of skin.  Chest: Denies DAVIDSON, cyanosis, wheezing, cough, and sputum production.  Abdomen: Appetite fine. No weight loss. Denies diarrhea, abdominal pain, hematemesis, or blood in stool.  Musculoskeletal: Some joint stiffness or swelling. Some back pain.  : As above.  All other review of systems negative.    PMH:   has a past medical history of Anemia, Anxiety, Arthritis, CHF (congestive heart failure), Hyperlipemia, Hypertension, and Neck pain.    PSH:   has a past surgical history that includes  section; Dilation and curettage of uterus; Hysterectomy; Tubal ligation; and breast reduction (10/02/2018).    FamHx: family history includes Anesthesia problems in her other; Breast cancer in her maternal aunt and paternal aunt; Colon cancer in her brother; Ovarian cancer in her paternal aunt.    SocHx:  reports that she quit smoking about 3 months ago. She has a 25.00 pack-year smoking history. she has never used smokeless tobacco. She reports that she does not drink alcohol or use drugs.     Physical Exam:  Vitals:   Vitals:    19 1134   BP: 133/73   Pulse: 74   Resp: (!) 22   Temp: 98.2 °F (36.8 °C)     General: A&Ox3. No apparent distress. No deformities.  Neck: No masses. Normal thyroid.  Lungs: normal inspiration. No use  of accessory muscles.  Heart: normal pulse. No arrhythmias.  Abdomen: Soft. NT. ND. Obese  Skin: The skin is warm and dry. No jaundice.  Ext: No c/c/e.  : deferred    Labs/Studies: see chart    Impression/Plan:   1. Right ureteral obstruction merits stent; if fails will recc right nephrostomy.  Upper ureter tortuous behind a transition point at pelvic brim.  Sig chance of stent failure.  Discussed risks/benefits of stent with pt and all questions answered.

## 2023-11-02 NOTE — LETTER
[facility]  Patient Centered Plan of Care  About Me:        Patient Name:  Amalia Harris    YOB: 1950  Age:         73 year old   Heather MRN:    [id number] Telephone Information:  Home Phone [phone]   Mobile [phone]       Address:  [address] [place] [address] Email address:  [email or web address]      Emergency Contact(s)    Name Relationship Lgl Grd Work Phone Home Phone Mobile Phone   1. ADRIAN KIRAN Son   [phone]    2. NO KIRAN Daughter   [phone]           Primary language:  [facility]     needed? Yes   [employer]:  [phone] op. 1  Other communication barriers:Language barrier    Preferred Method of Communication:  Mail  Current living arrangement: I live in a private home with family    Mobility Status/ Medical Equipment: Independent        Health Maintenance  Health Maintenance Reviewed: Due/Overdue   Health Maintenance Due   Topic Date Due    HF ACTION PLAN  Never done    ZOSTER IMMUNIZATION (1 of 2) Never done    RSV VACCINE (Pregnancy & 60+) (1 - 1-dose 60+ series) Never done    MAMMO SCREENING  01/15/2023    MICROALBUMIN  07/25/2023    EYE EXAM  07/27/2023    PHQ-9  07/30/2023    INFLUENZA VACCINE (1) 09/01/2023    COVID-19 Vaccine (4 - 2023-24 season) 09/01/2023           My Access Plan  Medical Emergency 911   Primary Clinic Line [facility] [phone]   24 Hour Appointment Line [phone] or  [phone]) (toll-free)   24 Hour Nurse Line [phone] (toll-free)   Preferred Urgent Care [facility] [phone]     Preferred Hospital [facility]  [phone]     Preferred Pharmacy Rx [facility] - [place], [place] - [address] [place] [address]     Behavioral Health Crisis Line The National Suicide Prevention Lifeline at 1-492.600.6653 or Text/Call 8           My Care Team Members  Patient Care Team         Relationship Specialty  Notifications Start End    Terry Sanchez MD PCP - General Family Practice  9/5/19     Phone: [phone] Fax: [phone]         [address] [place] [address]    Terry Sanchez MD Assigned PCP   9/15/19     Phone: [phone] Fax: [phone]         [address] [place] [address]    Amol Weinstein MD Assigned Surgical Provider   7/30/22     Phone: [phone] Fax: [phone]         [address] [facility] [address]    Zeynep Figueroa, RN Lead Care Coordinator Primary Care - CC Admissions 11/2/23     Phone: [phone] Fax: [phone]                    My Care Plans  Self Management and Treatment Plan    Care Plan  Care Plan: Utilization       Problem: Increased risk of re-admission       Goal: I would like additional resources and support to manage my health and prevent future avoidable ED visits/hospital admissions       Start Date: 11/2/2023 Expected End Date: 2/2/2024    Note:     Barriers: none  Strengths: patient is motivated to work on her health   Patient expressed understanding of goal: yes  Action steps to achieve this goal:  1. I will seek the appropriate level of care to manage my health conditions, symptoms, and questions. [facility] main Phone: [phone]  2. I will take my medications as prescribed and contact my care team for questions and refill requests.  3. I will contact my Care Coordination team with questions as needed.                                Action Plans on File:                       Advance Care Plans/Directives:   Advanced Care Plan/Directives on file:   No    Discussed with patient/caregiver(s):   Declined Further Information             My Medical and Care Information  Problem List   Patient Active Problem List   Diagnosis    Hyperlipidemia LDL goal <100    Major depression    Osteoarthritis    Advance care planning    Intertriginous candidiasis    Obesity, Class I, BMI 30-34.9    Type 2 diabetes mellitus with  microalbuminuria, without long-term current use of insulin (H)    Essential hypertension with goal blood pressure less than 140/90    CHF (congestive heart failure) (H)    Hypocitraturia    Low urine output    Uric acid nephrolithiasis    Urinary tract stones    Benign hypertensive heart disease with congestive heart failure (H)    Calculus of ureter    Dyslipidemia    Hydronephrosis with urinary obstruction due to ureteral calculus    History of COVID-19    History of adenomatous polyp of colon    Posterior vitreous detachment of both eyes    Pseudophakia, ou    Hypoxia    Aspiration pneumonia, unspecified aspiration pneumonia type, unspecified laterality, unspecified part of lung (H)          Care Coordination Start Date: 11/2/2023   Frequency of Care Coordination: monthly, more frequently as needed     Form Last Updated: 11/02/2023

## 2023-11-02 NOTE — PROGRESS NOTES
Clinic Care Coordination Contact  Clinic Care Coordination Contact  OUTREACH with Post Discharge Assessment    Referral Information:  Referral Source: IP Report    Primary Diagnosis: Pneumonia    Chief Complaint   Patient presents with    Clinic Care Coordination - Post Hospital      [place] Utilization: Clinic Utilization  Difficulty keeping appointments:: No  Compliance Concerns: No  No-Show Concerns: No  No PCP office visit in Past Year: No  Utilization      No Show Count (past year)  0             ED Visits  1             Hospital Admissions  1                    Current as of: 11/2/2023  3:01 AM              Clinical Concerns:  Current Medical Concerns:    Patient Active Problem List   Diagnosis    Hyperlipidemia LDL goal <100    Major depression    Osteoarthritis    Advance care planning    Intertriginous candidiasis    Obesity, Class I, BMI 30-34.9    Type 2 diabetes mellitus with microalbuminuria, without long-term current use of insulin (H)    Essential hypertension with goal blood pressure less than 140/90    CHF (congestive heart failure) (H)    Hypocitraturia    Low urine output    Uric acid nephrolithiasis    Urinary tract stones    Benign hypertensive heart disease with congestive heart failure (H)    Calculus of ureter    Dyslipidemia    Hydronephrosis with urinary obstruction due to ureteral calculus    History of COVID-19    History of adenomatous polyp of colon    Posterior vitreous detachment of both eyes    Pseudophakia, ou    Hypoxia    Aspiration pneumonia, unspecified aspiration pneumonia type, unspecified laterality, unspecified part of lung (H)      Current Behavioral Concerns: none    Education Provided to patient: Per patients IP AVS.    Pain  Pain (GOAL):: No  Health Maintenance Reviewed: Due/Overdue   Health Maintenance Due   Topic Date Due    HF ACTION PLAN  Never done    ZOSTER IMMUNIZATION (1 of 2) Never done    RSV VACCINE (Pregnancy & 60+) (1 - 1-dose 60+ series) Never done     MAMMO SCREENING  01/15/2023    MICROALBUMIN  07/25/2023    EYE EXAM  07/27/2023    PHQ-9  07/30/2023    INFLUENZA VACCINE (1) 09/01/2023    COVID-19 Vaccine (4 - 2023-24 season) 09/01/2023      Clinical Pathway: Clinic Care Coordination Pneumonia Assessment  Symptom Review:  How have you been feeling now that you are home?  The same as when she was discharged from hospital   Are you having any increased shortness of breath? No  Are you having any fevers? No  Emotions/Lifestyle:    Do you smoke? reports that she has never smoked. She has never used smokeless tobacco.  Would you like to try to quit smoking? NA  Admission:    Admission Date: 10/30/23   Reason for Admission: lightheadedness and acute hypoxic respiratory failure  Discharge:   Discharge Date: 11/01/23  Discharge Diagnosis: Aspiration pneumonia, unspecified aspiration pneumonia type, unspecified laterality, unspecified part of lung    Enrollment  Outreach Frequency: monthly, more frequently as needed    Discharge Assessment  How are you doing now that you are home?: ANNETTE RN called [facility]  services at [phone]. Call was conducted with the assistance of [employer] .  CC RN contacted patient, introduced self, role, care coordination program and reason for call.  Patient reports feeling the same as discharge. Dizziness has resolved. Patient has support of adult sons. She recently moved in with her adult son in [facility] and reports wanting to est care with [facility]. CC RN offered to warm transfer her call to priority scheduling line to make recommended PCP follow up visit, however, patient relies on her adult son for transportation, she must talk with him prior to making appointment. Patients discharge summary also advised follow up with cardiology however, no referral to cardiology was provided. Patient reports not following with [facility] system. CC RN offered to send PCP  request for cardiology referral. Patient prefers to discuss referral with PCP. Patient agreeable to CC, goal created and discussed.  How are your symptoms? (Red Flag symptoms escalate to triage hotline per guidelines): Unchanged  Do you feel your condition is stable enough to be safe at home until your provider visit?: Yes  Does the patient have their discharge instructions? : Yes  Does the patient have questions regarding their discharge instructions? : No  Were you started on any new medications or were there changes to any of your previous medications? : Yes  Does the patient have all of their medications?: Yes  Do you have questions regarding any of your medications? : No (Offered MTM referral, and pt declined, her and her adult son manage her medications. CC RN advised to use her IP AVS as its her most up to date med list.)  Discharge follow-up appointment scheduled within 14 calendar days? : No  Is patient agreeable to assistance with scheduling? : No         Post-op (Clinicians Only)  Did the patient have surgery or a procedure: No  Fever: No  Chills: No  Eating & Drinking: eating and drinking without complaints/concerns  PO Intake: regular diet  Bowel Function: normal  Date of last BM: 11/02/23  Urinary Status: voiding without complaint/concerns    Medication Management:  Medication review status: Medications reviewed and no changes reported per patient.          Functional Status:  Dependent ADLs:: Independent  Dependent IADLs:: Transportation, Shopping  Bed or wheelchair confined:: No  Mobility Status: Independent  Fallen 2 or more times in the past year?: No  Any fall with injury in the past year?: No    Living Situation:  Current living arrangement:: I live in a private home with family  Type of residence:: Private home - no stairs    Lifestyle & Psychosocial Needs:    Social Determinants of Health     Food Insecurity: Low Risk  (11/2/2023)    Food Insecurity     Within the past 12 months, did you worry  that your food would run out before you got money to buy more?: No     Within the past 12 months, did the food you bought just not last and you didn t have money to get more?: No   Depression: Not at risk (1/30/2023)    PHQ-2     PHQ-2 Score: 0   Housing Stability: Not on file   Tobacco Use: Low Risk  (10/30/2023)    Patient History     Smoking Tobacco Use: Never     Smokeless Tobacco Use: Never     Passive Exposure: Not on file   Financial Resource Strain: Low Risk  (11/2/2023)    Financial Resource Strain     Within the past 12 months, have you or your family members you live with been unable to get utilities (heat, electricity) when it was really needed?: No   Alcohol Use: Not on file   Transportation Needs: Low Risk  (11/2/2023)    Transportation Needs     Within the past 12 months, has lack of transportation kept you from medical appointments, getting your medicines, non-medical meetings or appointments, work, or from getting things that you need?: No   Physical Activity: Not on file   Interpersonal Safety: Not on file   Stress: Not on file   Social Connections: Not on file     Diet:: Regular  Inadequate nutrition (GOAL):: No  Tube Feeding: No  Inadequate activity/exercise (GOAL):: No  Significant changes in sleep pattern (GOAL): No  Transportation means:: Regular car, Family     Baptist or spiritual beliefs that impact treatment:: No  Mental health DX:: No  Mental health management concern (GOAL):: No  Chemical Dependency Status: No Current Concerns  Informal Support system:: Family        Resources and Interventions:  Current Resources:      Community Resources: None  Supplies Currently Used at Home: None  Equipment Currently Used at Home: none  Employment Status: retired     Advance Care Plan/Directive  Advanced Care Plans/Directives on file:: No  Discussed with patient/caregiver:: Declined Further Information    Referrals Placed: None     Care Plan:   Care Plan: Utilization       Problem: Increased risk  of re-admission       Goal: I would like additional resources and support to manage my health and prevent future avoidable ED visits/hospital admissions       Start Date: 11/2/2023 Expected End Date: 2/2/2024    Note:     Barriers: none  Strengths: patient is motivated to work on her health   Patient expressed understanding of goal: yes  Action steps to achieve this goal:  1. I will seek the appropriate level of care to manage my health conditions, symptoms, and questions. [facility] main Phone: [phone]  2. I will take my medications as prescribed and contact my care team for questions and refill requests.  3. I will contact my Care Coordination team with questions as needed.                                Patient/Caregiver understanding: yes     Outreach Frequency: monthly, more frequently as needed  Future Appointments                In 3 months Terry Sanchez MD [facility] [place]            Plan: 1. Patient verbalized good understanding of care plans and will follow recommendations.  2. Patient enrolled in Care Coordination, goal(s) identified at this time.   3. Patient centered care plan, and introduction letter sent to patient.  4. CC RN will reach out to patient in 1 month, if additional need(s) arise patient encouraged to contact CC RN or care team directly sooner.     Zeynep Figueroa RN, BSN, PHN Care Coordinator  [facility]   Phone: [phone]

## 2023-11-02 NOTE — LETTER
[facility] CARE COORDINATION  November 2, 2023    Amalia Harris  [address]  [place] [address]      Dear Amalia,    I am a clinic care coordinator who works with Terry Sanchez MD with the [facility]. I wanted to thank you for spending the time to talk with me.  Below is a description of clinic care coordination and how I can further assist you.       The clinic care coordination team is made up of a registered nurse, , financial resource worker and community health worker who understand the health care system. The goal of clinic care coordination is to help you manage your health and improve access to the health care system. Our team works alongside your provider to assist you in determining your health and social needs. We can help you obtain health care and community resources, providing you with necessary information and education. We can work with you through any barriers and develop a care plan that helps coordinate and strengthen the communication between you and your care team.  Our services are voluntary and are offered without charge to you personally.    Please feel free to contact me with any questions or concerns regarding care coordination and what we can offer.      We are focused on providing you with the highest-quality healthcare experience possible.    Sincerely,     Zeynep Figueroa RN, BSN, PHN Care Coordinator  [facility]   Phone: [phone]

## 2023-11-13 ENCOUNTER — APPOINTMENT (OUTPATIENT)
Dept: INTERPRETER SERVICES | Facility: CLINIC | Age: 73
End: 2023-11-13
Payer: MEDICARE

## 2023-11-13 ENCOUNTER — PATIENT OUTREACH (OUTPATIENT)
Dept: FAMILY MEDICINE | Facility: CLINIC | Age: 73
End: 2023-11-13
Payer: MEDICARE

## 2023-11-13 NOTE — TELEPHONE ENCOUNTER
This writer attempted to contact pt via [employer]  on 11/13/23      Reason for call med rec and left message.      If patient calls back:    Review current med list. Route to provider to complete med rec process.       Delmy Miles RN

## 2023-11-14 NOTE — TELEPHONE ENCOUNTER
This writer attempted to contact pt via [employer]  on 11/14/23        Reason for call med rec and left message.        If patient calls back:     Review current med list. Route to provider to complete med rec process.         Delmy Miles RN

## 2023-11-15 ENCOUNTER — APPOINTMENT (OUTPATIENT)
Dept: INTERPRETER SERVICES | Facility: CLINIC | Age: 73
End: 2023-11-15
Payer: MEDICARE

## 2023-11-15 NOTE — TELEPHONE ENCOUNTER
"This writer attempted to contact patient via [employer]  on 11/15/23 (3rd attempt)      Reason for call med rec and left message.      If patient calls back:   Review current med list below, use dot phrase \"providermedrecacoinstructions\" at end of note for provider instructions/next steps    Current Outpatient Medications   Medication    amoxicillin-clavulanate (AUGMENTIN) 875-125 MG tablet    blood glucose (FREESTYLE TEST STRIPS) test strip    blood glucose (NO BRAND SPECIFIED) lancets standard    blood glucose monitoring (ACCU-CHEK FASTCLIX) lancets    blood glucose monitoring (NO BRAND SPECIFIED) meter device kit    lancets 28G MISC    lovastatin (MEVACOR) 40 MG tablet    metFORMIN (GLUCOPHAGE XR) 500 MG 24 hr tablet    metFORMIN (GLUCOPHAGE XR) 500 MG 24 hr tablet    metoprolol succinate ER (TOPROL XL) 25 MG 24 hr tablet    potassium citrate (UROCIT-K) 10 MEQ (1080 MG) CR tablet     No current facility-administered medications for this visit.       STAR YoungN, RN  [facility]  "

## 2023-11-30 ENCOUNTER — PATIENT OUTREACH (OUTPATIENT)
Dept: CARE COORDINATION | Facility: CLINIC | Age: 73
End: 2023-11-30
Payer: MEDICARE

## 2023-11-30 NOTE — LETTER
[facility] CARE COORDINATION  December 14, 2023    Amalia Harris  [address]  [place] [address]      Dear Amalia,    I have been attempting to reach you since our last contact. I would like to continue to work with you and provide any additional support you may need on achieving your health care related goals. I would appreciate if you would give me a call at [phone] to let me know if you would like to continue working together. I know that there are many things that can affect our ability to communicate and I hope we can continue to work together.    All of us at the [facility] are invested in your health and are here to assist you in meeting your goals.     Sincerely,    Zeynep Figueroa RN, BSN, PHN Care Coordinator  [facility]   Phone: [phone]

## 2023-11-30 NOTE — PROGRESS NOTES
Clinic Care Coordination Contact  [facility]/Voicemail    Clinical Data: Care Coordinator Outreach    Outreach Documentation Number of Outreach Attempt   11/30/2023  11:25 AM 1     CC RN called [employer],  services at [phone]. Call was conducted with the assistance of . Left message on patient's voicemail with call back information and requested return call.    Plan: Care Coordinator will try to reach patient again in 10 business days.    Zeynep Figueroa RN, BSN, PHN Care Coordinator  [facility]   Phone: [phone]

## 2023-12-14 ENCOUNTER — APPOINTMENT (OUTPATIENT)
Dept: INTERPRETER SERVICES | Facility: CLINIC | Age: 73
End: 2023-12-14
Payer: MEDICARE

## 2023-12-14 NOTE — PROGRESS NOTES
Clinic Care Coordination Contact  [facility]/Voicemail    Clinical Data: Care Coordinator Outreach    Outreach Documentation Number of Outreach Attempt   11/30/2023  11:25 AM 1   12/14/2023   3:01 PM 2       CC RN called [employer],  services at [phone]. Call was conducted with the assistance of . Left message on patient's voicemail with call back information and requested return call.     Plan: Care Coordinator will send unable to contact letter with care coordinator contact information via [employer]. Care Coordinator will try to reach patient again in 1 month.    Zeynep Figueroa RN, BSN, PHN Care Coordinator  [facility]   Phone: [phone]

## 2023-12-18 ENCOUNTER — ANCILLARY PROCEDURE (OUTPATIENT)
Dept: GENERAL RADIOLOGY | Facility: CLINIC | Age: 73
End: 2023-12-18
Attending: FAMILY MEDICINE
Payer: MEDICARE

## 2023-12-18 ENCOUNTER — OFFICE VISIT (OUTPATIENT)
Dept: FAMILY MEDICINE | Facility: CLINIC | Age: 73
End: 2023-12-18
Payer: MEDICARE

## 2023-12-18 VITALS
RESPIRATION RATE: 24 BRPM | DIASTOLIC BLOOD PRESSURE: 74 MMHG | OXYGEN SATURATION: 97 % | WEIGHT: 172.6 LBS | TEMPERATURE: 97.6 F | HEART RATE: 86 BPM | HEIGHT: 58 IN | SYSTOLIC BLOOD PRESSURE: 136 MMHG | BODY MASS INDEX: 36.23 KG/M2

## 2023-12-18 DIAGNOSIS — Q24.8 LEFT VENTRICULAR OUTFLOW TRACT OBSTRUCTION: Primary | ICD-10-CM

## 2023-12-18 DIAGNOSIS — R80.9 TYPE 2 DIABETES MELLITUS WITH MICROALBUMINURIA, WITHOUT LONG-TERM CURRENT USE OF INSULIN (H): ICD-10-CM

## 2023-12-18 DIAGNOSIS — J06.9 VIRAL URI WITH COUGH: ICD-10-CM

## 2023-12-18 DIAGNOSIS — Z23 NEED FOR SHINGLES VACCINE: ICD-10-CM

## 2023-12-18 DIAGNOSIS — R82.991 HYPOCITRATURIA: ICD-10-CM

## 2023-12-18 DIAGNOSIS — Z23 NEED FOR VACCINATION: ICD-10-CM

## 2023-12-18 DIAGNOSIS — E78.5 HYPERLIPIDEMIA LDL GOAL <100: ICD-10-CM

## 2023-12-18 DIAGNOSIS — J69.0 ASPIRATION PNEUMONIA, UNSPECIFIED ASPIRATION PNEUMONIA TYPE, UNSPECIFIED LATERALITY, UNSPECIFIED PART OF LUNG (H): ICD-10-CM

## 2023-12-18 DIAGNOSIS — E11.29 TYPE 2 DIABETES MELLITUS WITH MICROALBUMINURIA, WITHOUT LONG-TERM CURRENT USE OF INSULIN (H): ICD-10-CM

## 2023-12-18 DIAGNOSIS — I05.0 SEVERE MITRAL VALVE STENOSIS: ICD-10-CM

## 2023-12-18 DIAGNOSIS — Z29.11 NEED FOR VACCINATION AGAINST RESPIRATORY SYNCYTIAL VIRUS: ICD-10-CM

## 2023-12-18 DIAGNOSIS — I11.0 BENIGN HYPERTENSIVE HEART DISEASE WITH CONGESTIVE HEART FAILURE (H): ICD-10-CM

## 2023-12-18 DIAGNOSIS — Q24.8 LEFT VENTRICULAR OUTFLOW TRACT OBSTRUCTION: ICD-10-CM

## 2023-12-18 PROBLEM — R09.02 HYPOXIA: Status: RESOLVED | Noted: 2023-10-30 | Resolved: 2023-12-18

## 2023-12-18 PROBLEM — N20.9 URINARY TRACT STONES: Status: RESOLVED | Noted: 2017-12-14 | Resolved: 2023-12-18

## 2023-12-18 PROCEDURE — 90480 ADMN SARSCOV2 VAC 1/ONLY CMP: CPT | Performed by: FAMILY MEDICINE

## 2023-12-18 PROCEDURE — 71046 X-RAY EXAM CHEST 2 VIEWS: CPT | Mod: TC | Performed by: RADIOLOGY

## 2023-12-18 PROCEDURE — 82570 ASSAY OF URINE CREATININE: CPT | Performed by: FAMILY MEDICINE

## 2023-12-18 PROCEDURE — 82043 UR ALBUMIN QUANTITATIVE: CPT | Performed by: FAMILY MEDICINE

## 2023-12-18 PROCEDURE — 90662 IIV NO PRSV INCREASED AG IM: CPT | Performed by: FAMILY MEDICINE

## 2023-12-18 PROCEDURE — G0008 ADMIN INFLUENZA VIRUS VAC: HCPCS | Performed by: FAMILY MEDICINE

## 2023-12-18 PROCEDURE — 91320 SARSCV2 VAC 30MCG TRS-SUC IM: CPT | Performed by: FAMILY MEDICINE

## 2023-12-18 PROCEDURE — 99215 OFFICE O/P EST HI 40 MIN: CPT | Mod: 25 | Performed by: FAMILY MEDICINE

## 2023-12-18 RX ORDER — RESPIRATORY SYNCYTIAL VIRUS VACCINE 120MCG/0.5
0.5 KIT INTRAMUSCULAR ONCE
Qty: 1 EACH | Refills: 0 | Status: CANCELLED | OUTPATIENT
Start: 2023-12-18 | End: 2023-12-18

## 2023-12-18 RX ORDER — METOPROLOL SUCCINATE 25 MG/1
25 TABLET, EXTENDED RELEASE ORAL DAILY
Qty: 90 TABLET | Refills: 1 | Status: SHIPPED | OUTPATIENT
Start: 2023-12-18 | End: 2024-05-01

## 2023-12-18 RX ORDER — POTASSIUM CITRATE 10 MEQ/1
30 TABLET, EXTENDED RELEASE ORAL 2 TIMES DAILY
Qty: 540 TABLET | Refills: 3 | Status: SHIPPED | OUTPATIENT
Start: 2023-12-18

## 2023-12-18 RX ORDER — CODEINE PHOSPHATE AND GUAIFENESIN 10; 100 MG/5ML; MG/5ML
1-2 SOLUTION ORAL EVERY 4 HOURS PRN
Qty: 118 ML | Refills: 0 | Status: ON HOLD | OUTPATIENT
Start: 2023-12-18 | End: 2024-04-11

## 2023-12-18 RX ORDER — METFORMIN HCL 500 MG
1000 TABLET, EXTENDED RELEASE 24 HR ORAL 2 TIMES DAILY WITH MEALS
Qty: 360 TABLET | Refills: 1 | Status: SHIPPED | OUTPATIENT
Start: 2023-12-18 | End: 2024-06-15

## 2023-12-18 RX ORDER — LOVASTATIN 40 MG
40 TABLET ORAL EVERY EVENING
Qty: 90 TABLET | Refills: 1 | Status: SHIPPED | OUTPATIENT
Start: 2023-12-18 | End: 2024-05-01

## 2023-12-18 ASSESSMENT — PATIENT HEALTH QUESTIONNAIRE - PHQ9
10. IF YOU CHECKED OFF ANY PROBLEMS, HOW DIFFICULT HAVE THESE PROBLEMS MADE IT FOR YOU TO DO YOUR WORK, TAKE CARE OF THINGS AT HOME, OR GET ALONG WITH OTHER PEOPLE: NOT DIFFICULT AT ALL
SUM OF ALL RESPONSES TO PHQ QUESTIONS 1-9: 2
SUM OF ALL RESPONSES TO PHQ QUESTIONS 1-9: 2

## 2023-12-18 ASSESSMENT — PAIN SCALES - GENERAL: PAINLEVEL: NO PAIN (0)

## 2023-12-18 NOTE — PROGRESS NOTES
Assessment & Plan     (Q24.8) Left ventricular outflow tract obstruction  (primary encounter diagnosis)  Comment: The patient never started the metoprolol prescribed by the cardiologist, because she understood that blood pressure medication were the cause of her problem. I believe I have explained things to her enough to understand that her underlying heart disease is due to something else, worsened by certain BP meds, but she should go ahead and start metoprolol.   Plan: metoprolol succinate ER (TOPROL XL) 25 MG 24 hr        tablet, Adult Cardiology Eval          Referral, XR Chest 2 Views        A cardiology referral was not made previously so I have done that today.     (I05.0) Severe mitral valve stenosis  Comment: see above.   Plan: Adult Cardiology Eval  Referral, XR         Chest 2 Views            (J69.0) Aspiration pneumonia, unspecified aspiration pneumonia type, unspecified laterality, unspecified part of lung (H)  Comment: I think this has resolved.   Plan: XR Chest 2 Views            (I11.0) Benign hypertensive heart disease with congestive heart failure (H)  Comment: borderline controlled.   Plan: Adult Cardiology Eval  Referral        Metoprolol.     (E11.29,  R80.9) Type 2 diabetes mellitus with microalbuminuria, without long-term current use of insulin (H)  Comment: Controlled. I updated her urine lab and prescriptions.   Plan: Albumin Random Urine Quantitative with Creat         Ratio, Adult Eye  Referral, lovastatin        (MEVACOR) 40 MG tablet, metFORMIN (GLUCOPHAGE         XR) 500 MG 24 hr tablet        Return in about 4 months (around 4/30/2024) for diabetes.      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   Plan: potassium citrate (UROCIT-K) 10 MEQ (1080 MG)         CR tablet            (E78.5) Hyperlipidemia LDL goal <100  Comment: Likewise.   Plan: lovastatin (MEVACOR) 40 MG tablet        Return in about 4  months (around 4/30/2024) for diabetes.      (J06.9) Viral URI with cough  Comment: It seems like she has a simple cold, unrelated to the pneumonitis.   Plan: guaiFENesin-codeine (ROBITUSSIN AC) 100-10         MG/5ML solution        follow up if symptoms persist past 10 days.     (Z23) Need for vaccination  Comment:   Plan: INFLUENZA VACCINE 65+ (FLUZONE HD), COVID-19         12+ (2023-24) (PFIZER)            (Z29.11) Need for vaccination against respiratory syncytial virus  (Z23) Need for shingles vaccine  Comment: she would need to get these at a pharmacy  Plan: not ordered here        68 minutes spent by me on the date of the encounter doing chart review, review of outside records, review of test results, interpretation of tests, patient visit, and documentation. 46 minutes spent with the patient in the exam room.         Terry Sanchez MD  [facility] ION Holland is a 73 year old, presenting for the following health issues:  She is accompanied by her  (our telephone service)     [facility] F/U        12/18/2023     3:28 PM   Additional Questions   Roomed by Ezequiel OROURKE       [facility] Follow-up Visit:    Hospital/Nursing Home/IP Rehab Facility: [facility]  Date of Admission: 10/30/2023  Date of Discharge: 11/01/2023  Reason(s) for Admission: Severe LVOT obstruction  Severe mitral stenosis  History of CHF  Aspiration pneumonitis    Was your hospitalization related to COVID-19? No   Problems taking medications regularly:  None  Medication changes since discharge: Stopped losartan. Addition of metoprolol (and completion of Augmentin). Medication list updated and reviewed with the patient.   Problems adhering to non-medication therapy:  None    Summary of hospitalization:  [facility] discharge summary reviewed  Diagnostic Tests/Treatments reviewed.  Follow up needed: none  Other Healthcare Providers Involved in Patient s  "Care:         Specialist appointment - Cardiology yet to be scheduled.  Update since discharge: improved.         Plan of care communicated with patient                 Review of Systems         Objective    /74 (BP Location: Right arm, Patient Position: Chair, Cuff Size: Adult Regular)   Pulse 86   Temp 97.6  F (36.4  C) (Oral)   Resp 24   Ht 1.47 m (4' 9.87\")   Wt 78.3 kg (172 lb 9.6 oz)   SpO2 97%   BMI 36.23 kg/m    Body mass index is 36.23 kg/m .  Physical Exam   GENERAL: healthy, alert and no distress  EYES: Eyes grossly normal to inspection, PERRL, EOMI, sclerae white and conjunctivae normal  RESP: lungs clear to auscultation - no crackles or wheezes, no areas of dullness, no tachypnea  CV: Heart regular rate and rhythm with 2/6 systolic murmur loudest along the left sternal border. No click or rub. No peripheral edema and peripheral pulses strong  MS: no gross musculoskeletal defects noted, no edema  SKIN: no suspicious lesions or rashes to visible skin  NEURO: Normal strength and tone, sensory exam grossly normal, mentation intact, oriented times 3 and cranial nerves 2-12 intact  PSYCH: mentation appears normal, affect normal/bright      A Chest X-Ray was ordered. My reading of this film is cardiomegaly, no infiltrate. (No comparison films available: pending review by Radiologist.)               This document serves as a record of the services and decisions personally performed and made by Dr. Sanchez. It was created on his behalf by Amrit Ambriz, a trained medical scribe. The creation of this document is based the provider's statements to the medical scribe.  Amrit Ambriz,  5:47 PM      Answers submitted by the patient for this visit:  Patient Health Questionnaire (Submitted on 12/18/2023)  If you checked off any problems, how difficult have these problems made it for you to do your work, take care of things at home, or get along with other people?: Not difficult at all  PHQ9 TOTAL SCORE: 2    "

## 2023-12-18 NOTE — LETTER
December 19, 2023      Amalia Harris  [address]  [place] [address]        Dear ,    We are writing to inform you of your test results.    Your urine microalbumin test was normal.  This should be done yearly.    Resulted Orders   Albumin Random Urine Quantitative with Creat Ratio   Result Value Ref Range    Creatinine Urine mg/dL 24.3 mg/dL      Comment:      The reference ranges have not been established in urine creatinine. The results should be integrated into the clinical context for interpretation.    Albumin Urine mg/L <12.0 mg/L      Comment:      The reference ranges have not been established in urine albumin. The results should be integrated into the clinical context for interpretation.    Albumin Urine mg/g Cr        Comment:      Unable to calculate, urine albumin and/or urine creatinine is outside detectable limits.  Microalbuminuria is defined as an albumin:creatinine ratio of 17 to 299 for males and 25 to 299 for females. A ratio of albumin:creatinine of 300 or higher is indicative of overt proteinuria.  Due to biologic variability, positive results should be confirmed by a second, first-morning random or 24-hour timed urine specimen. If there is discrepancy, a third specimen is recommended. When 2 out of 3 results are in the microalbuminuria range, this is evidence for incipient nephropathy and warrants increased efforts at glucose control, blood pressure control, and institution of therapy with an angiotensin-converting-enzyme (ACE) inhibitor (if the patient can tolerate it).         If you have any questions or concerns, please call the clinic at the number listed above.       Sincerely,      Terry Sanchez MD

## 2023-12-19 LAB
CREAT UR-MCNC: 24.3 MG/DL
MICROALBUMIN UR-MCNC: <12 MG/L
MICROALBUMIN/CREAT UR: NORMAL MG/G{CREAT}

## 2023-12-20 DIAGNOSIS — R80.9 TYPE 2 DIABETES MELLITUS WITH MICROALBUMINURIA, WITHOUT LONG-TERM CURRENT USE OF INSULIN (H): ICD-10-CM

## 2023-12-20 DIAGNOSIS — E11.29 TYPE 2 DIABETES MELLITUS WITH MICROALBUMINURIA, WITHOUT LONG-TERM CURRENT USE OF INSULIN (H): ICD-10-CM

## 2023-12-20 RX ORDER — BLOOD SUGAR DIAGNOSTIC
1 STRIP MISCELLANEOUS DAILY
Qty: 100 STRIP | Refills: 3 | Status: SHIPPED | OUTPATIENT
Start: 2023-12-20 | End: 2024-01-15

## 2024-01-04 ENCOUNTER — PATIENT OUTREACH (OUTPATIENT)
Dept: CARE COORDINATION | Facility: CLINIC | Age: 74
End: 2024-01-04
Payer: MEDICARE

## 2024-01-15 DIAGNOSIS — E11.29 TYPE 2 DIABETES MELLITUS WITH MICROALBUMINURIA, WITHOUT LONG-TERM CURRENT USE OF INSULIN (H): ICD-10-CM

## 2024-01-15 DIAGNOSIS — R80.9 TYPE 2 DIABETES MELLITUS WITH MICROALBUMINURIA, WITHOUT LONG-TERM CURRENT USE OF INSULIN (H): ICD-10-CM

## 2024-01-16 RX ORDER — BLOOD SUGAR DIAGNOSTIC
1 STRIP MISCELLANEOUS DAILY
Qty: 100 STRIP | Refills: 3 | Status: SHIPPED | OUTPATIENT
Start: 2024-01-16

## 2024-01-18 ENCOUNTER — PATIENT OUTREACH (OUTPATIENT)
Dept: CARE COORDINATION | Facility: CLINIC | Age: 74
End: 2024-01-18
Payer: MEDICARE

## 2024-01-18 ENCOUNTER — APPOINTMENT (OUTPATIENT)
Dept: INTERPRETER SERVICES | Facility: CLINIC | Age: 74
End: 2024-01-18
Payer: MEDICARE

## 2024-01-18 NOTE — PROGRESS NOTES
Clinic Care Coordination Contact  [facility]/Voicemail    Clinical Data: Care Coordinator Outreach    Outreach Documentation Number of Outreach Attempt   11/30/2023  11:25 AM 1   12/14/2023   3:01 PM 2   1/18/2024   2:59 PM 3       CC RN called [employer],  services at [phone]. Call was conducted with the assistance of .     Left message on patient's voicemail with call back information and requested return call.    Plan: Care Coordinator will send disenrollment letter with care coordinator contact information via [facility]. Care Coordinator will do no further outreaches at this time.    Zeynep Figueroa RN, BSN, PHN Care Coordinator  [place], [place], and [place]   Phone: [phone]

## 2024-01-18 NOTE — LETTER
[facility] CARE COORDINATION  January 18, 2024    Amalia Harris  [address]  [place] [address]      Dear Amalia,    I have been unsuccessful in reaching you since our last contact. At this time the Care Coordination team will make no further attempts to reach you, however this does not change your ability to continue receiving care from your providers at your primary care clinic. If you need additional support from a care coordinator in the future please contact Heather [facility] Community [employer] Worker (CHW) at [phone].    All of us at [facility] are invested in your health and are here to assist you in meeting your goals.     Sincerely,    Zeynep Figueroa RN, BSN, PHN Care Coordinator  [place], [place], and [place]   Phone: [phone]

## 2024-01-23 ENCOUNTER — OFFICE VISIT (OUTPATIENT)
Dept: OPTOMETRY | Facility: CLINIC | Age: 74
End: 2024-01-23
Attending: FAMILY MEDICINE
Payer: MEDICARE

## 2024-01-23 DIAGNOSIS — R80.9 TYPE 2 DIABETES MELLITUS WITH MICROALBUMINURIA, WITHOUT LONG-TERM CURRENT USE OF INSULIN (H): Primary | ICD-10-CM

## 2024-01-23 DIAGNOSIS — E11.29 TYPE 2 DIABETES MELLITUS WITH MICROALBUMINURIA, WITHOUT LONG-TERM CURRENT USE OF INSULIN (H): Primary | ICD-10-CM

## 2024-01-23 DIAGNOSIS — H11.151 PINGUECULA OF RIGHT EYE: ICD-10-CM

## 2024-01-23 DIAGNOSIS — Z96.1 PSEUDOPHAKIA: ICD-10-CM

## 2024-01-23 DIAGNOSIS — H43.819 PVD (POSTERIOR VITREOUS DETACHMENT), UNSPECIFIED LATERALITY: ICD-10-CM

## 2024-01-23 DIAGNOSIS — H52.223 REGULAR ASTIGMATISM OF BOTH EYES: ICD-10-CM

## 2024-01-23 PROCEDURE — 99214 OFFICE O/P EST MOD 30 MIN: CPT | Performed by: OPTOMETRIST

## 2024-01-23 PROCEDURE — 92015 DETERMINE REFRACTIVE STATE: CPT | Mod: GY | Performed by: OPTOMETRIST

## 2024-01-23 ASSESSMENT — REFRACTION_MANIFEST
OD_AXIS: 012
OD_SPHERE: -0.75
METHOD_AUTOREFRACTION: 1
OD_ADD: +3.00
OD_CYLINDER: +0.75
OS_ADD: +3.00
OD_CYLINDER: +0.75
OS_AXIS: 166
OS_SPHERE: -0.50
OS_AXIS: 165
OS_CYLINDER: +0.50
OD_SPHERE: -0.25
OD_AXIS: 015
OS_SPHERE: -1.00
OS_CYLINDER: +1.50

## 2024-01-23 ASSESSMENT — EXTERNAL EXAM - RIGHT EYE: OD_EXAM: NORMAL

## 2024-01-23 ASSESSMENT — REFRACTION_WEARINGRX
OS_ADD: +2.50
OD_AXIS: 045
OD_SPHERE: PLANO
SPECS_TYPE: BROKE
OD_ADD: +2.50
OD_CYLINDER: +0.25
OS_AXIS: 162
OS_CYLINDER: +0.75
OS_SPHERE: -0.50

## 2024-01-23 ASSESSMENT — CUP TO DISC RATIO
OS_RATIO: 0.4
OD_RATIO: 0.3

## 2024-01-23 ASSESSMENT — CONF VISUAL FIELD
OS_SUPERIOR_TEMPORAL_RESTRICTION: 0
OD_SUPERIOR_NASAL_RESTRICTION: 0
OS_NORMAL: 1
OD_INFERIOR_NASAL_RESTRICTION: 0
OD_NORMAL: 1
OS_SUPERIOR_NASAL_RESTRICTION: 0
OS_INFERIOR_NASAL_RESTRICTION: 0
OD_SUPERIOR_TEMPORAL_RESTRICTION: 0
OS_INFERIOR_TEMPORAL_RESTRICTION: 0
OD_INFERIOR_TEMPORAL_RESTRICTION: 0

## 2024-01-23 ASSESSMENT — EXTERNAL EXAM - LEFT EYE: OS_EXAM: NORMAL

## 2024-01-23 ASSESSMENT — SLIT LAMP EXAM - LIDS
COMMENTS: 2+ DERMATOCHALASIS
COMMENTS: 2+ DERMATOCHALASIS

## 2024-01-23 ASSESSMENT — KERATOMETRY
OS_K1POWER_DIOPTERS: 44.25
OD_K2POWER_DIOPTERS: 44.25
OD_AXISANGLE2_DEGREES: 122
OS_AXISANGLE2_DEGREES: 67
OD_K1POWER_DIOPTERS: 43.75
OD_AXISANGLE_DEGREES: 32
OS_AXISANGLE_DEGREES: 157
OS_K2POWER_DIOPTERS: 45.50

## 2024-01-23 ASSESSMENT — TONOMETRY
IOP_METHOD: APPLANATION
OS_IOP_MMHG: 17
OD_IOP_MMHG: 17

## 2024-01-23 ASSESSMENT — VISUAL ACUITY
OS_SC: 20/20
METHOD: SNELLEN - LINEAR
OD_SC: 20/20

## 2024-01-23 NOTE — LETTER
1/23/2024         RE: Amalia Harris  [address]  [place] [facility] [address]        Dear Colleague,    Thank you for referring your patient, Amalia Harris, to the [facility]. Please see a copy of my visit note below.    Chief Complaint   Patient presents with     Diabetic Eye Exam        Chief Complaint(s) and History of Present Illness(es)       Diabetic Eye Exam                    Lab Results   Component Value Date    A1C 6.6 10/30/2023    A1C 6.6 01/30/2023    A1C 6.1 07/25/2022    A1C 6.4 08/02/2021    A1C 6.4 11/30/2020    A1C 7.4 03/09/2020    A1C 6.3 09/05/2019    A1C 6.8 12/17/2018    A1C 6.4 05/25/2016            Last Eye Exam: 2021  Dilated Previously: Yes    What are you currently using to see?  Not using glasses- broken     Distance Vision Acuity: Noticed gradual change in both eyes    Near Vision Acuity: Not satisfied     Eye Comfort: good  Do you use eye drops? : No  Occupation or Hobbies: retired    Mariam Kee - Optometric Assistant     Medical, surgical and family histories reviewed and updated 1/23/2024.       OBJECTIVE: See Ophthalmology exam    ASSESSMENT:    ICD-10-CM    1. Type 2 diabetes mellitus with microalbuminuria, without long-term current use of insulin (H) - Both Eyes  E11.29 Adult Eye  Referral    R80.9       2. Pseudophakia, ou  Z96.1       3. Regular astigmatism of both eyes - Both Eyes  H52.223       4. Pinguecula of right eye - Right Eye  H11.151       5. PVD (posterior vitreous detachment), unspecified laterality - Both Eyes  H43.819           PLAN:    Amalia Harris aware  eye exam results will be sent to Terry Sanchez  Patient Instructions   Patient Education  Diabetes weakens the blood vessels all over the body, including the eyes. Damage to the blood vessels in the eyes can cause swelling or bleeding into part of the eye (called the retina). This is called diabetic retinopathy (PAOLO-tin-AH-puh-thee). If not treated, this disease can cause vision  loss or blindness.   Symptoms may include blurred or distorted vision, but many people have no symptoms. It's important to see your eye doctor regularly to check for problems.   Early treatment and good control can help protect your vision. Here are the things you can do to help prevent vision loss:      1. Keep your blood sugar levels under tight control.      2. Bring high blood pressure under control.      3. No smoking.      4. Have yearly dilated eye exams.     Astigmatism results from curvature differential in the cornea and crystalline lens which can cause a distorted image, as light rays are prevented from meeting at a common focus.    You have a PVD- posterior vitreous detachment which is due to the gel of the eye shrinking and clumping together.  This can sometimes cause holes or tears in the retina.  The signs of a retinal detachment are flashes of light or a curtain coming over the vision. If you notice any of these changes please let me know right away.  If I am not available this is an emergency type situation that you would need to be seen. I recommend the [facility] Emergency Room or call [phone].      Eyeglass prescription given.    The affects of the dilating drops last for 4- 6 hours.  You will be more sensitive to light and vision will be blurry up close.  Do not drive if you do not feel comfortable.  Mydriatic sunglasses were given if needed.    Recommend annual eye exams.      Jyoti Saenz O.D.  [address] [address]    [phone]               Again, thank you for allowing me to participate in the care of your patient.        Sincerely,        Jyoti Saenz, OD

## 2024-01-23 NOTE — PATIENT INSTRUCTIONS
Patient Education   Diabetes weakens the blood vessels all over the body, including the eyes. Damage to the blood vessels in the eyes can cause swelling or bleeding into part of the eye (called the retina). This is called diabetic retinopathy (PAOLO-tin-AH-puh-thee). If not treated, this disease can cause vision loss or blindness.   Symptoms may include blurred or distorted vision, but many people have no symptoms. It's important to see your eye doctor regularly to check for problems.   Early treatment and good control can help protect your vision. Here are the things you can do to help prevent vision loss:      1. Keep your blood sugar levels under tight control.      2. Bring high blood pressure under control.      3. No smoking.      4. Have yearly dilated eye exams.     Astigmatism results from curvature differential in the cornea and crystalline lens which can cause a distorted image, as light rays are prevented from meeting at a common focus.    You have a PVD- posterior vitreous detachment which is due to the gel of the eye shrinking and clumping together.  This can sometimes cause holes or tears in the retina.  The signs of a retinal detachment are flashes of light or a curtain coming over the vision. If you notice any of these changes please let me know right away.  If I am not available this is an emergency type situation that you would need to be seen. I recommend the [facility] Emergency Room or call [phone].      Eyeglass prescription given.    The affects of the dilating drops last for 4- 6 hours.  You will be more sensitive to light and vision will be blurry up close.  Do not drive if you do not feel comfortable.  Mydriatic sunglasses were given if needed.    Recommend annual eye exams.      Jyoti Saenz O.D.  [facility]   [address] [place] [address]    [phone]

## 2024-01-23 NOTE — PROGRESS NOTES
Chief Complaint   Patient presents with    Diabetic Eye Exam        Chief Complaint(s) and History of Present Illness(es)       Diabetic Eye Exam                    Lab Results   Component Value Date    A1C 6.6 10/30/2023    A1C 6.6 01/30/2023    A1C 6.1 07/25/2022    A1C 6.4 08/02/2021    A1C 6.4 11/30/2020    A1C 7.4 03/09/2020    A1C 6.3 09/05/2019    A1C 6.8 12/17/2018    A1C 6.4 05/25/2016            Last Eye Exam: 2021  Dilated Previously: Yes    What are you currently using to see?  Not using glasses- broken     Distance Vision Acuity: Noticed gradual change in both eyes    Near Vision Acuity: Not satisfied     Eye Comfort: good  Do you use eye drops? : No  Occupation or Hobbies: retired    Mariam Kee - Optometric Assistant     Medical, surgical and family histories reviewed and updated 1/23/2024.       OBJECTIVE: See Ophthalmology exam    ASSESSMENT:    ICD-10-CM    1. Type 2 diabetes mellitus with microalbuminuria, without long-term current use of insulin (H) - Both Eyes  E11.29 Adult Eye  Referral    R80.9       2. Pseudophakia, ou  Z96.1       3. Regular astigmatism of both eyes - Both Eyes  H52.223       4. Pinguecula of right eye - Right Eye  H11.151       5. PVD (posterior vitreous detachment), unspecified laterality - Both Eyes  H43.819           PLAN:    Amalia Harris aware  eye exam results will be sent to Terry Sanchez  Patient Instructions   Patient Education  Diabetes weakens the blood vessels all over the body, including the eyes. Damage to the blood vessels in the eyes can cause swelling or bleeding into part of the eye (called the retina). This is called diabetic retinopathy (PAOLO-tin-AH-puh-thee). If not treated, this disease can cause vision loss or blindness.   Symptoms may include blurred or distorted vision, but many people have no symptoms. It's important to see your eye doctor regularly to check for problems.   Early treatment and good control can help protect your vision.  Here are the things you can do to help prevent vision loss:      1. Keep your blood sugar levels under tight control.      2. Bring high blood pressure under control.      3. No smoking.      4. Have yearly dilated eye exams.     Astigmatism results from curvature differential in the cornea and crystalline lens which can cause a distorted image, as light rays are prevented from meeting at a common focus.    You have a PVD- posterior vitreous detachment which is due to the gel of the eye shrinking and clumping together.  This can sometimes cause holes or tears in the retina.  The signs of a retinal detachment are flashes of light or a curtain coming over the vision. If you notice any of these changes please let me know right away.  If I am not available this is an emergency type situation that you would need to be seen. I recommend the [facility] Emergency Room or call [phone].      Eyeglass prescription given.    The affects of the dilating drops last for 4- 6 hours.  You will be more sensitive to light and vision will be blurry up close.  Do not drive if you do not feel comfortable.  Mydriatic sunglasses were given if needed.    Recommend annual eye exams.      Jyoti Saenz O.D.  [address] [address] [phone]

## 2024-02-15 ENCOUNTER — TELEPHONE (OUTPATIENT)
Dept: FAMILY MEDICINE | Facility: CLINIC | Age: 74
End: 2024-02-15
Payer: MEDICARE

## 2024-02-15 NOTE — TELEPHONE ENCOUNTER
Patient Quality Outreach    Patient is due for the following:   Diabetes -  LDL (Fasting) and Foot Exam  Breast Cancer Screening - Mammogram  Physical Annual Wellness Visit    Next Steps:   Schedule a Annual Wellness Visit    Type of outreach:    Sent [employer] message.    Next Steps:  Reach out within 90 days via Letter.    Max number of attempts reached: No. Will try again in 90 days if patient still on fail list.    Questions for provider review:    None           Sayda Medrano CNA

## 2024-02-23 ENCOUNTER — OFFICE VISIT (OUTPATIENT)
Dept: CARDIOLOGY | Facility: CLINIC | Age: 74
End: 2024-02-23
Attending: FAMILY MEDICINE
Payer: MEDICARE

## 2024-02-23 VITALS
BODY MASS INDEX: 35.26 KG/M2 | HEART RATE: 58 BPM | SYSTOLIC BLOOD PRESSURE: 122 MMHG | WEIGHT: 168 LBS | RESPIRATION RATE: 20 BRPM | HEIGHT: 58 IN | DIASTOLIC BLOOD PRESSURE: 66 MMHG

## 2024-02-23 DIAGNOSIS — I35.0 MODERATE AORTIC VALVE STENOSIS: ICD-10-CM

## 2024-02-23 DIAGNOSIS — I05.0 SEVERE MITRAL VALVE STENOSIS: ICD-10-CM

## 2024-02-23 DIAGNOSIS — E78.5 HYPERLIPIDEMIA LDL GOAL <100: ICD-10-CM

## 2024-02-23 DIAGNOSIS — Q24.8 LEFT VENTRICULAR OUTFLOW TRACT OBSTRUCTION: Primary | ICD-10-CM

## 2024-02-23 DIAGNOSIS — E11.29 TYPE 2 DIABETES MELLITUS WITH MICROALBUMINURIA, WITHOUT LONG-TERM CURRENT USE OF INSULIN (H): ICD-10-CM

## 2024-02-23 DIAGNOSIS — R80.9 TYPE 2 DIABETES MELLITUS WITH MICROALBUMINURIA, WITHOUT LONG-TERM CURRENT USE OF INSULIN (H): ICD-10-CM

## 2024-02-23 DIAGNOSIS — R06.09 DOE (DYSPNEA ON EXERTION): ICD-10-CM

## 2024-02-23 DIAGNOSIS — E66.01 SEVERE OBESITY (BMI 35.0-39.9) WITH COMORBIDITY (H): ICD-10-CM

## 2024-02-23 DIAGNOSIS — I10 ESSENTIAL HYPERTENSION WITH GOAL BLOOD PRESSURE LESS THAN 140/90: ICD-10-CM

## 2024-02-23 PROCEDURE — 99205 OFFICE O/P NEW HI 60 MIN: CPT | Performed by: INTERNAL MEDICINE

## 2024-02-23 RX ORDER — BLOOD-GLUCOSE METER
KIT MISCELLANEOUS
COMMUNITY
Start: 2023-12-20

## 2024-02-23 NOTE — PROGRESS NOTES
Click to link to [facility] HEART CARE NOTE    Thank you, Dr. Sanchez, for asking me to see Amalia Harris in consultation at [facility] to evaluate LVOT obstruction, valvular heart disease.      Assessment/Plan:   Severe LVOT obstruction, valvular heart disease due to moderate to severe mitral valve regurgitation, moderate aortic valve regurgitation: The patient had several echo over last several years which always mentioned LVOT obstruction, mitral valve stenosis and aortic valve stenosis.  Her ECG showed sinus rhythm with mild LVH.  The etiology of her severe LVOT obstruction with peak gradient 180 mmHg, mean gradient 96 mmHg is not clear.  She did not have further cardiac evaluation except for echo.  We discussed the evaluation and management.  Cardiac MRI is requested for evaluation of aortic valve, mitral valve morphology and function, severe LVOT obstruction to rule out hypertrophic cardiomyopathy or congenital subaortic valve membrane.  Dyspnea on exertion and chest tightness: The patient has several risk factors for developing significant coronary artery disease including type 2 diabetes, dyslipidemia, her age, hypertension.  Due to her symptoms, stress cardiac MRI is added to her regular cardiac MRI as mentioned above.  Benign essential hypertension: Her blood pressure is controlled with metoprolol succinate to 25 mg daily.  Dyslipidemia: Continue lovastatin 40 mg at bedtime.  Type 2 diabetes, morbid obesity: No change in treatment today.    The patient does not speak English.  Medical history is translated by a professional [facility]    Thank you for the opportunity to be involved in the care of Amalia Harris. If you have any questions, please feel free to contact me.  I will see the patient again in 6 months and as needed    Much or all of the text in this note was generated through the use of Dragon Dictate voice-to-text software. Errors in spelling or  words which seem out of context are unintentional.   Sound alike errors, in particular, may have escaped editing.       History of Present Illness:   It is my pleasure to see Amalia Harris at the [facility] clinic for evaluation of LVOT obstruction, valvular heart disease. Amalia Harris is a 73 year old female with a medical history of type 2 diabetes, benign essential hypertension, dyslipidemia, moderate to severe mitral valve regurgitation with mean gradient 8 mmHg, moderate aortic valve stenosis, significant LVOT obstruction.    The patient was admitted to hospital at the end of October, discharged on November 1, 2023 due to lightheadedness, acute hypoxia secondary to acute pneumonia.  During hospitalization, her echocardiogram was reported as severe LVOT obstruction with peak gradient 180 mmHg, mean gradient 96 mmHg.  The patient is referred to cardiology service for further evaluation and management.    She denies chest pain, however, she complains of intermittent chest tightness that lasted for several minutes.  She developed shortness of breath on exertion which has no significant change over last 6 months.  Complains of palpitations lasted for 5 minutes, roughly once or twice a year.  She had no dizziness, orthopnea, PND or leg edema.  Her weight has been stable.  Her blood pressure and heart rate are controlled.      Past Medical History:     Patient Active Problem List   Diagnosis    Hyperlipidemia LDL goal <100    Major depression    Osteoarthritis    Advance care planning    Intertriginous candidiasis    Severe obesity (BMI 35.0-39.9) with comorbidity (H)    Type 2 diabetes mellitus with microalbuminuria, without long-term current use of insulin (H)    Essential hypertension with goal blood pressure less than 140/90    CHF (congestive heart failure) (H)    Hypocitraturia    Low urine output    Uric acid nephrolithiasis    Benign hypertensive heart disease with congestive heart failure (H)     Calculus of ureter    Hydronephrosis with urinary obstruction due to ureteral calculus    History of COVID-19    History of adenomatous polyp of colon    Posterior vitreous detachment of both eyes    Pseudophakia, ou    Aspiration pneumonia, unspecified aspiration pneumonia type, unspecified laterality, unspecified part of lung (H)       Past Surgical History:     Past Surgical History:   Procedure Laterality Date    CATARACT IOL, RT/LT      CHOLECYSTECTOMY      COLONOSCOPY WITH CO2 INSUFFLATION N/A 02/10/2021    Procedure: COLONOSCOPY, WITH CO2 INSUFFLATION;  Surgeon: Loco Andersen MD;  Location: [facility]    GALLBLADDER SURGERY  1986    PHACOEMULSIFICATION WITH STANDARD INTRAOCULAR LENS IMPLANT Right 11/11/2019    Procedure: RIGHT PHACOEMULSIFICATION, CATARACT, WITH STANDARD IOL INSERTION;  Surgeon: Robel Clancy MD;  Location: [facility] OR    PHACOEMULSIFICATION WITH STANDARD INTRAOCULAR LENS IMPLANT Left 11/25/2019    Procedure: LEFT PHACOEMULSIFICATION, CATARACT, WITH STANDARD IOL INSERTION;  Surgeon: Robel Clancy MD;  Location: [facility] OR    [place] CYSTO/URETERO W/LITHOTRIPSY &INDWELL STENT INSRT Right 09/29/2017    Procedure: CYSTOSCOPY, RIGHT URETEROSCOPY LASER LITHOTRIPSY STENT INSERTION;  Surgeon: Dominguez Stoner MD;  Location: [facility];  Service: Urology       Family History:     Family History   Problem Relation Age of Onset    Respiratory Father     Hyperlipidemia Father     Hyperlipidemia Sister     Cancer Brother         throat or esophageal (alcohol)    Hypertension No family hx of     Cerebrovascular Disease No family hx of     Coronary Artery Disease No family hx of     Diabetes No family hx of     Anesthesia Reaction No family hx of     Bleeding Disorder No family hx of     Thrombophilia No family hx of     Asthma Father        Social History:    reports that she has never smoked. She has never used smokeless tobacco. She reports that she does not drink alcohol and does not use  "drugs.    Review of Systems:   12 systems are reviewed negative except for in HPI.    Meds:     Current Outpatient Medications:     blood glucose (FREESTYLE TEST STRIPS) test strip, 1 strip by In Vitro route daily, Disp: 100 strip, Rfl: 3    blood glucose (NO BRAND SPECIFIED) lancets standard, Use to test blood sugar 1 times daily or as directed., Disp: 100 each, Rfl: 3    Blood Glucose Monitoring Suppl (FREESTYLE LITE) w/Device KIT, USE TO TEST BLOOD SUGAR ONE TIME DAILY OR AS DIRECTED., Disp: , Rfl:     lovastatin (MEVACOR) 40 MG tablet, Take 1 tablet (40 mg) by mouth every evening for cholesterol., Disp: 90 tablet, Rfl: 1    metFORMIN (GLUCOPHAGE XR) 500 MG 24 hr tablet, Take 2 tablets (1,000 mg) by mouth 2 times daily (with meals) for diabetes., Disp: 360 tablet, Rfl: 1    metoprolol succinate ER (TOPROL XL) 25 MG 24 hr tablet, Take 1 tablet (25 mg) by mouth daily for heart failure (and blood pressure)., Disp: 90 tablet, Rfl: 1    potassium citrate (UROCIT-K) 10 MEQ (1080 MG) CR tablet, Take 3 tablets (30 mEq) by mouth 2 times daily for kidney stone prevention., Disp: 540 tablet, Rfl: 3    blood glucose monitoring (ACCU-CHEK FASTCLIX) lancets, U TO TEST D UTD (Patient not taking: Reported on 2/23/2024), Disp: , Rfl:     blood glucose monitoring (NO BRAND SPECIFIED) meter device kit, Use to test blood sugar 1 times daily or as directed. (Patient not taking: Reported on 2/23/2024), Disp: 1 kit, Rfl: 0    guaiFENesin-codeine (ROBITUSSIN AC) 100-10 MG/5ML solution, Take 5-10 mLs by mouth every 4 hours as needed for cough (may cause drowsiness) . This is a non-refillable prescription. (Patient not taking: Reported on 2/23/2024), Disp: 118 mL, Rfl: 0    lancets 28G MISC, 1 each by Other route (Patient not taking: Reported on 2/23/2024), Disp: , Rfl:      Allergies:   No clinical screening - see comments    Objective:      Physical Exam  76.2 kg (168 lb)  1.469 m (4' 9.85\")  Body mass index is 35.29 kg/m .  /66 " "(BP Location: Right arm, Patient Position: Sitting, Cuff Size: Adult Regular)   Pulse 58   Resp 20   Ht 1.469 m (4' 9.85\")   Wt 76.2 kg (168 lb)   BMI 35.29 kg/m      General Appearance:   Awake, Alert, No acute distress.   HEENT:  Pupil equal, reactive to light. No scleral icterus; the mucous membranes were moist. No oral ulcers or thrush.    Neck: No cervical bruits. No JVD. No thyromegaly. No lymph node enlargement or tenderness.   Chest: The spine was straight. The chest was symmetric.   Lungs:   Respirations unlabored. Lungs are clear to auscultation. No crackles. No wheezing.   Cardiovascular:   RRR, normal first and second heart sounds with II/VI systolic murmurs at RUSB and apex. No rubs or gallops.    Abdomen:  Obese.  Soft. No tenderness. Non-distended. Bowels sounds are present   Extremities: Equal posterior tibial pulses. No leg edema.   Skin: No rashes or ulcers. Warm, Dry.   Musculoskeletal: No tenderness. No deformity.   Neurologic: Mood and affect are appropriate. No focal deficits.         EKG:  Personally reivewed  Sinus tachycardia   Left axis deviation   Minimal voltage criteria for LVH, may be normal variant   Nonspecific ST abnormality   Abnormal ECG   When compared with ECG of 30-OCT-2023 14:09,   No significant change was found     Cardiac Imaging Studies  Echo on October 31, 2023:  Severe LVOT obstruction with peak gradient of 180 and mean gradient 96 mmHgb.  The left ventricular cavity is small.  Hyperdynamic left ventricular function  There is moderate to severe mitral annular calcification.  There is moderate to severe mitral stenosis.  The left atrium is severely dilated.  Mild (35-45mmHg) pulmonary hypertension is present.  Normal right ventricle size and systolic function.    Echo December 31, 2019:  Technically difficult study with limited windows.  Small, hyperdynamic left ventricle, LVEF of 65-70%. No wall motion  abnormalities on limited views.  Right ventricular function, " chamber size, wall motion, and thickness are  normal.  Aortic stenosis is in the moderate range. Peak velocity probably overestimates  the degree of stenosis in the setting of a hyperdynamic LV.  Probable mitral stenosis due to mitral annular and valvular calcifciation.  Mean gradient of 8mmHg at 89bpm.  Estimated pulmonary artery pressure of 39 mmHg.  The inferior vena cava was normal in size with preserved respiratoryvariability.  No pericardial effusion is present.      Lab Review   Lab Results   Component Value Date     10/31/2023     11/30/2020    CO2 27 10/31/2023    CO2 30 07/25/2022    CO2 32 11/30/2020    BUN 17.3 10/31/2023    BUN 39 07/25/2022    BUN 23 11/30/2020     Lab Results   Component Value Date    WBC 10.3 10/31/2023    WBC 10.0 01/29/2021    HGB 10.6 10/31/2023    HGB 13.9 01/29/2021    HCT 33.0 10/31/2023    HCT 43.3 01/29/2021    MCV 91 10/31/2023    MCV 93 01/29/2021     10/31/2023     01/29/2021     Lab Results   Component Value Date    CHOL 160 01/30/2023    CHOL 160 11/30/2020    TRIG 256 01/30/2023    TRIG 201 11/30/2020    HDL 49 01/30/2023    HDL 63 11/30/2020    LDL 60 01/30/2023    LDL 57 11/30/2020     LDL Cholesterol Calculated   Date Value Ref Range Status   01/30/2023 60 <=100 mg/dL Final   11/30/2020 57 <100 mg/dL Final     Comment:     Desirable:       <100 mg/dl     Lab Results   Component Value Date    TSH 2.09 09/12/2018

## 2024-02-23 NOTE — LETTER
2/23/2024    Terry Sanchez MD  [address]  [place] [address]    RE: Amalia Harris       Dear Colleague,     I had the pleasure of seeing Amalia Harris in the [facility].      Click to link to [facility] HEART CARE NOTE    Thank you, Dr. Sanchez, for asking me to see Amalia Harris in consultation at [facility] to evaluate LVOT obstruction, valvular heart disease.      Assessment/Plan:   Severe LVOT obstruction, valvular heart disease due to moderate to severe mitral valve regurgitation, moderate aortic valve regurgitation: The patient had several echo over last several years which always mentioned LVOT obstruction, mitral valve stenosis and aortic valve stenosis.  Her ECG showed sinus rhythm with mild LVH.  The etiology of her severe LVOT obstruction with peak gradient 180 mmHg, mean gradient 96 mmHg is not clear.  She did not have further cardiac evaluation except for echo.  We discussed the evaluation and management.  Cardiac MRI is requested for evaluation of aortic valve, mitral valve morphology and function, severe LVOT obstruction to rule out hypertrophic cardiomyopathy or congenital subaortic valve membrane.  Dyspnea on exertion and chest tightness: The patient has several risk factors for developing significant coronary artery disease including type 2 diabetes, dyslipidemia, her age, hypertension.  Due to her symptoms, stress cardiac MRI is added to her regular cardiac MRI as mentioned above.  Benign essential hypertension: Her blood pressure is controlled with metoprolol succinate to 25 mg daily.  Dyslipidemia: Continue lovastatin 40 mg at bedtime.  Type 2 diabetes, morbid obesity: No change in treatment today.    The patient does not speak English.  Medical history is translated by a professional [facility] .    Thank you for the opportunity to be involved in the care of Amalia Harris. If you have any questions, please feel free to contact  me.  I will see the patient again in 6 months and as needed    Much or all of the text in this note was generated through the use of Dragon Dictate voice-to-text software. Errors in spelling or words which seem out of context are unintentional.   Sound alike errors, in particular, may have escaped editing.       History of Present Illness:   It is my pleasure to see Amalia Harris at the [facility] clinic for evaluation of LVOT obstruction, valvular heart disease. Amalia Harris is a 73 year old female with a medical history of type 2 diabetes, benign essential hypertension, dyslipidemia, moderate to severe mitral valve regurgitation with mean gradient 8 mmHg, moderate aortic valve stenosis, significant LVOT obstruction.    The patient was admitted to hospital at the end of October, discharged on November 1, 2023 due to lightheadedness, acute hypoxia secondary to acute pneumonia.  During hospitalization, her echocardiogram was reported as severe LVOT obstruction with peak gradient 180 mmHg, mean gradient 96 mmHg.  The patient is referred to cardiology service for further evaluation and management.    She denies chest pain, however, she complains of intermittent chest tightness that lasted for several minutes.  She developed shortness of breath on exertion which has no significant change over last 6 months.  Complains of palpitations lasted for 5 minutes, roughly once or twice a year.  She had no dizziness, orthopnea, PND or leg edema.  Her weight has been stable.  Her blood pressure and heart rate are controlled.      Past Medical History:     Patient Active Problem List   Diagnosis    Hyperlipidemia LDL goal <100    Major depression    Osteoarthritis    Advance care planning    Intertriginous candidiasis    Severe obesity (BMI 35.0-39.9) with comorbidity (H)    Type 2 diabetes mellitus with microalbuminuria, without long-term current use of insulin (H)    Essential hypertension with goal blood pressure less  than 140/90    CHF (congestive heart failure) (H)    Hypocitraturia    Low urine output    Uric acid nephrolithiasis    Benign hypertensive heart disease with congestive heart failure (H)    Calculus of ureter    Hydronephrosis with urinary obstruction due to ureteral calculus    History of COVID-19    History of adenomatous polyp of colon    Posterior vitreous detachment of both eyes    Pseudophakia, ou    Aspiration pneumonia, unspecified aspiration pneumonia type, unspecified laterality, unspecified part of lung (H)       Past Surgical History:     Past Surgical History:   Procedure Laterality Date    CATARACT IOL, RT/LT      CHOLECYSTECTOMY      COLONOSCOPY WITH CO2 INSUFFLATION N/A 02/10/2021    Procedure: COLONOSCOPY, WITH CO2 INSUFFLATION;  Surgeon: Loco Andersen MD;  Location: [facility]    GALLBLADDER SURGERY  1986    PHACOEMULSIFICATION WITH STANDARD INTRAOCULAR LENS IMPLANT Right 11/11/2019    Procedure: RIGHT PHACOEMULSIFICATION, CATARACT, WITH STANDARD IOL INSERTION;  Surgeon: Robel Clancy MD;  Location: [facility]    PHACOEMULSIFICATION WITH STANDARD INTRAOCULAR LENS IMPLANT Left 11/25/2019    Procedure: LEFT PHACOEMULSIFICATION, CATARACT, WITH STANDARD IOL INSERTION;  Surgeon: Robel Clancy MD;  Location: [facility] OR    IN CYSTO/URETERO W/LITHOTRIPSY &INDWELL STENT INSRT Right 09/29/2017    Procedure: CYSTOSCOPY, RIGHT URETEROSCOPY LASER LITHOTRIPSY STENT INSERTION;  Surgeon: Dominguez Stoner MD;  Location: [facility];  Service: Urology       Family History:     Family History   Problem Relation Age of Onset    Respiratory Father     Hyperlipidemia Father     Hyperlipidemia Sister     Cancer Brother         throat or esophageal (alcohol)    Hypertension No family hx of     Cerebrovascular Disease No family hx of     Coronary Artery Disease No family hx of     Diabetes No family hx of     Anesthesia Reaction No family hx of     Bleeding Disorder No family hx of     Thrombophilia No family hx  of     Asthma Father        Social History:    reports that she has never smoked. She has never used smokeless tobacco. She reports that she does not drink alcohol and does not use drugs.    Review of Systems:   12 systems are reviewed negative except for in HPI.    Meds:     Current Outpatient Medications:     blood glucose (FREESTYLE TEST STRIPS) test strip, 1 strip by In Vitro route daily, Disp: 100 strip, Rfl: 3    blood glucose (NO BRAND SPECIFIED) lancets standard, Use to test blood sugar 1 times daily or as directed., Disp: 100 each, Rfl: 3    Blood Glucose Monitoring Suppl (FREESTYLE LITE) w/Device KIT, USE TO TEST BLOOD SUGAR ONE TIME DAILY OR AS DIRECTED., Disp: , Rfl:     lovastatin (MEVACOR) 40 MG tablet, Take 1 tablet (40 mg) by mouth every evening for cholesterol., Disp: 90 tablet, Rfl: 1    metFORMIN (GLUCOPHAGE XR) 500 MG 24 hr tablet, Take 2 tablets (1,000 mg) by mouth 2 times daily (with meals) for diabetes., Disp: 360 tablet, Rfl: 1    metoprolol succinate ER (TOPROL XL) 25 MG 24 hr tablet, Take 1 tablet (25 mg) by mouth daily for heart failure (and blood pressure)., Disp: 90 tablet, Rfl: 1    potassium citrate (UROCIT-K) 10 MEQ (1080 MG) CR tablet, Take 3 tablets (30 mEq) by mouth 2 times daily for kidney stone prevention., Disp: 540 tablet, Rfl: 3    blood glucose monitoring (ACCU-CHEK FASTCLIX) lancets, U TO TEST D UTD (Patient not taking: Reported on 2/23/2024), Disp: , Rfl:     blood glucose monitoring (NO BRAND SPECIFIED) meter device kit, Use to test blood sugar 1 times daily or as directed. (Patient not taking: Reported on 2/23/2024), Disp: 1 kit, Rfl: 0    guaiFENesin-codeine (ROBITUSSIN AC) 100-10 MG/5ML solution, Take 5-10 mLs by mouth every 4 hours as needed for cough (may cause drowsiness) . This is a non-refillable prescription. (Patient not taking: Reported on 2/23/2024), Disp: 118 mL, Rfl: 0    lancets 28G MISC, 1 each by Other route (Patient not taking: Reported on 2/23/2024),  "Disp: , Rfl:      Allergies:   No clinical screening - see comments    Objective:      Physical Exam  76.2 kg (168 lb)  1.469 m (4' 9.85\")  Body mass index is 35.29 kg/m .  /66 (BP Location: Right arm, Patient Position: Sitting, Cuff Size: Adult Regular)   Pulse 58   Resp 20   Ht 1.469 m (4' 9.85\")   Wt 76.2 kg (168 lb)   BMI 35.29 kg/m      General Appearance:   Awake, Alert, No acute distress.   HEENT:  Pupil equal, reactive to light. No scleral icterus; the mucous membranes were moist. No oral ulcers or thrush.    Neck: No cervical bruits. No JVD. No thyromegaly. No lymph node enlargement or tenderness.   Chest: The spine was straight. The chest was symmetric.   Lungs:   Respirations unlabored. Lungs are clear to auscultation. No crackles. No wheezing.   Cardiovascular:   RRR, normal first and second heart sounds with II/VI systolic murmurs at RUSB and apex. No rubs or gallops.    Abdomen:  Obese.  Soft. No tenderness. Non-distended. Bowels sounds are present   Extremities: Equal posterior tibial pulses. No leg edema.   Skin: No rashes or ulcers. Warm, Dry.   Musculoskeletal: No tenderness. No deformity.   Neurologic: Mood and affect are appropriate. No focal deficits.         EKG:  Personally reivewed  Sinus tachycardia   Left axis deviation   Minimal voltage criteria for LVH, may be normal variant   Nonspecific ST abnormality   Abnormal ECG   When compared with ECG of 30-OCT-2023 14:09,   No significant change was found     Cardiac Imaging Studies  Echo on October 31, 2023:  Severe LVOT obstruction with peak gradient of 180 and mean gradient 96 mmHgb.  The left ventricular cavity is small.  Hyperdynamic left ventricular function  There is moderate to severe mitral annular calcification.  There is moderate to severe mitral stenosis.  The left atrium is severely dilated.  Mild (35-45mmHg) pulmonary hypertension is present.  Normal right ventricle size and systolic function.    Echo December 31, " 2019:  Technically difficult study with limited windows.  Small, hyperdynamic left ventricle, LVEF of 65-70%. No wall motion  abnormalities on limited views.  Right ventricular function, chamber size, wall motion, and thickness are  normal.  Aortic stenosis is in the moderate range. Peak velocity probably overestimates  the degree of stenosis in the setting of a hyperdynamic LV.  Probable mitral stenosis due to mitral annular and valvular calcifciation.  Mean gradient of 8mmHg at 89bpm.  Estimated pulmonary artery pressure of 39 mmHg.  The inferior vena cava was normal in size with preserved respiratoryvariability.  No pericardial effusion is present.      Lab Review   Lab Results   Component Value Date     10/31/2023     11/30/2020    CO2 27 10/31/2023    CO2 30 07/25/2022    CO2 32 11/30/2020    BUN 17.3 10/31/2023    BUN 39 07/25/2022    BUN 23 11/30/2020     Lab Results   Component Value Date    WBC 10.3 10/31/2023    WBC 10.0 01/29/2021    HGB 10.6 10/31/2023    HGB 13.9 01/29/2021    HCT 33.0 10/31/2023    HCT 43.3 01/29/2021    MCV 91 10/31/2023    MCV 93 01/29/2021     10/31/2023     01/29/2021     Lab Results   Component Value Date    CHOL 160 01/30/2023    CHOL 160 11/30/2020    TRIG 256 01/30/2023    TRIG 201 11/30/2020    HDL 49 01/30/2023    HDL 63 11/30/2020    LDL 60 01/30/2023    LDL 57 11/30/2020     LDL Cholesterol Calculated   Date Value Ref Range Status   01/30/2023 60 <=100 mg/dL Final   11/30/2020 57 <100 mg/dL Final     Comment:     Desirable:       <100 mg/dl     Lab Results   Component Value Date    TSH 2.09 09/12/2018                   Thank you for allowing me to participate in the care of your patient.      Sincerely,     Luke Bentley MD     [facility]  cc:   Terry Sanchez MD  [address] [facility] [place] [address]

## 2024-02-26 ENCOUNTER — APPOINTMENT (OUTPATIENT)
Dept: INTERPRETER SERVICES | Facility: CLINIC | Age: 74
End: 2024-02-26
Payer: MEDICARE

## 2024-03-26 ENCOUNTER — HOSPITAL ENCOUNTER (OUTPATIENT)
Dept: MRI IMAGING | Facility: HOSPITAL | Age: 74
Discharge: HOME | End: 2024-03-26
Attending: INTERNAL MEDICINE
Payer: MEDICARE

## 2024-03-26 VITALS — DIASTOLIC BLOOD PRESSURE: 47 MMHG | OXYGEN SATURATION: 96 % | HEART RATE: 84 BPM | SYSTOLIC BLOOD PRESSURE: 106 MMHG

## 2024-03-26 DIAGNOSIS — I05.0 SEVERE MITRAL VALVE STENOSIS: ICD-10-CM

## 2024-03-26 DIAGNOSIS — Q24.8 LEFT VENTRICULAR OUTFLOW TRACT OBSTRUCTION: ICD-10-CM

## 2024-03-26 DIAGNOSIS — R06.09 DOE (DYSPNEA ON EXERTION): ICD-10-CM

## 2024-03-26 DIAGNOSIS — Q24.8 LEFT VENTRICULAR OUTFLOW TRACT OBSTRUCTION: Primary | ICD-10-CM

## 2024-03-26 LAB
ATRIAL RATE - MUSE: 64 BPM
ATRIAL RATE - MUSE: 81 BPM
DIASTOLIC BLOOD PRESSURE - MUSE: NORMAL MMHG
DIASTOLIC BLOOD PRESSURE - MUSE: NORMAL MMHG
INTERPRETATION ECG - MUSE: NORMAL
INTERPRETATION ECG - MUSE: NORMAL
P AXIS - MUSE: -2 DEGREES
P AXIS - MUSE: 54 DEGREES
PR INTERVAL - MUSE: 164 MS
PR INTERVAL - MUSE: 164 MS
QRS DURATION - MUSE: 82 MS
QRS DURATION - MUSE: 86 MS
QT - MUSE: 432 MS
QT - MUSE: 482 MS
QTC - MUSE: 497 MS
QTC - MUSE: 501 MS
R AXIS - MUSE: -23 DEGREES
R AXIS - MUSE: -24 DEGREES
SYSTOLIC BLOOD PRESSURE - MUSE: NORMAL MMHG
SYSTOLIC BLOOD PRESSURE - MUSE: NORMAL MMHG
T AXIS - MUSE: 108 DEGREES
T AXIS - MUSE: 74 DEGREES
VENTRICULAR RATE- MUSE: 64 BPM
VENTRICULAR RATE- MUSE: 81 BPM

## 2024-03-26 PROCEDURE — 93016 CV STRESS TEST SUPVJ ONLY: CPT | Performed by: INTERNAL MEDICINE

## 2024-03-26 PROCEDURE — 75565 CARD MRI VELOC FLOW MAPPING: CPT | Mod: 26 | Performed by: GENERAL ACUTE CARE HOSPITAL

## 2024-03-26 PROCEDURE — 255N000002 HC RX 255 OP 636: Performed by: INTERNAL MEDICINE

## 2024-03-26 PROCEDURE — 93018 CV STRESS TEST I&R ONLY: CPT | Performed by: GENERAL ACUTE CARE HOSPITAL

## 2024-03-26 PROCEDURE — 999N000122 MR MYOCARDIUM  OVERREAD

## 2024-03-26 PROCEDURE — 93010 ELECTROCARDIOGRAM REPORT: CPT | Mod: HOP | Performed by: INTERNAL MEDICINE

## 2024-03-26 PROCEDURE — 250N000011 HC RX IP 250 OP 636: Performed by: INTERNAL MEDICINE

## 2024-03-26 PROCEDURE — A9585 GADOBUTROL INJECTION: HCPCS | Performed by: INTERNAL MEDICINE

## 2024-03-26 PROCEDURE — 93005 ELECTROCARDIOGRAM TRACING: CPT

## 2024-03-26 PROCEDURE — 75563 CARD MRI W/STRESS IMG & DYE: CPT | Mod: 26 | Performed by: GENERAL ACUTE CARE HOSPITAL

## 2024-03-26 PROCEDURE — 75565 CARD MRI VELOC FLOW MAPPING: CPT | Mod: MG

## 2024-03-26 PROCEDURE — G1010 CDSM STANSON: HCPCS | Performed by: GENERAL ACUTE CARE HOSPITAL

## 2024-03-26 RX ORDER — REGADENOSON 0.08 MG/ML
0.4 INJECTION, SOLUTION INTRAVENOUS ONCE
Status: COMPLETED | OUTPATIENT
Start: 2024-03-26 | End: 2024-03-26

## 2024-03-26 RX ORDER — AMINOPHYLLINE 25 MG/ML
50 INJECTION, SOLUTION INTRAVENOUS
Status: DISCONTINUED | OUTPATIENT
Start: 2024-03-26 | End: 2024-03-26 | Stop reason: HOSPADM

## 2024-03-26 RX ORDER — GADOBUTROL 604.72 MG/ML
18 INJECTION INTRAVENOUS ONCE
Status: COMPLETED | OUTPATIENT
Start: 2024-03-26 | End: 2024-03-26

## 2024-03-26 RX ADMIN — REGADENOSON 0.4 MG: 0.08 INJECTION, SOLUTION INTRAVENOUS at 10:47

## 2024-03-26 RX ADMIN — GADOBUTROL 18 ML: 604.72 INJECTION INTRAVENOUS at 11:24

## 2024-03-29 DIAGNOSIS — R94.39 POSITIVE CARDIAC STRESS TEST: ICD-10-CM

## 2024-03-29 DIAGNOSIS — Q24.8 LEFT VENTRICULAR OUTFLOW TRACT OBSTRUCTION: ICD-10-CM

## 2024-03-29 DIAGNOSIS — R06.09 DOE (DYSPNEA ON EXERTION): Primary | ICD-10-CM

## 2024-04-08 ENCOUNTER — OFFICE VISIT (OUTPATIENT)
Dept: CARDIOLOGY | Facility: CLINIC | Age: 74
End: 2024-04-08
Attending: INTERNAL MEDICINE
Payer: MEDICARE

## 2024-04-08 VITALS
BODY MASS INDEX: 35.08 KG/M2 | WEIGHT: 167 LBS | SYSTOLIC BLOOD PRESSURE: 158 MMHG | DIASTOLIC BLOOD PRESSURE: 76 MMHG | HEART RATE: 55 BPM

## 2024-04-08 DIAGNOSIS — E66.01 SEVERE OBESITY (BMI 35.0-39.9) WITH COMORBIDITY (H): ICD-10-CM

## 2024-04-08 DIAGNOSIS — I34.0 NONRHEUMATIC MITRAL VALVE REGURGITATION: ICD-10-CM

## 2024-04-08 DIAGNOSIS — R06.09 DOE (DYSPNEA ON EXERTION): Primary | ICD-10-CM

## 2024-04-08 DIAGNOSIS — I10 ESSENTIAL HYPERTENSION WITH GOAL BLOOD PRESSURE LESS THAN 140/90: ICD-10-CM

## 2024-04-08 DIAGNOSIS — E78.5 HYPERLIPIDEMIA LDL GOAL <100: ICD-10-CM

## 2024-04-08 DIAGNOSIS — I05.0 SEVERE MITRAL VALVE STENOSIS: ICD-10-CM

## 2024-04-08 DIAGNOSIS — R80.9 TYPE 2 DIABETES MELLITUS WITH MICROALBUMINURIA, WITHOUT LONG-TERM CURRENT USE OF INSULIN (H): ICD-10-CM

## 2024-04-08 DIAGNOSIS — Q24.8 LEFT VENTRICULAR OUTFLOW TRACT OBSTRUCTION: ICD-10-CM

## 2024-04-08 DIAGNOSIS — E11.29 TYPE 2 DIABETES MELLITUS WITH MICROALBUMINURIA, WITHOUT LONG-TERM CURRENT USE OF INSULIN (H): ICD-10-CM

## 2024-04-08 DIAGNOSIS — I42.2 HYPERTROPHIC CARDIOMYOPATHY (H): ICD-10-CM

## 2024-04-08 PROCEDURE — 99214 OFFICE O/P EST MOD 30 MIN: CPT | Performed by: INTERNAL MEDICINE

## 2024-04-08 NOTE — H&P (VIEW-ONLY)
Click to link to [facility] HEART CARE NOTE       Assessment/Plan:   Hypertrophic cardiomyopathy with LVOT obstruction: The patient developed shortness of breath on exertion.  Her echocardiogram was reported significant LVOT obstruction with the peak LVOT gradient 108 mmHg, mean gradient 96 mmHg.  Cardiac MRI did not identify significant LVOT obstruction which could be underestimated.  Her symptoms dyspnea on exertion could be secondary to LVOT obstruction.  Her cardiac MRI was reported LV hyperdynamic, LVEF 80%.  We discussed evaluation and management.  The patient is on metoprolol succinate 25 mg daily.  Her ventricular rate is well-controlled between 55 and 60% at rest.  To better relieve her dyspnea on exertion, mavacamten 2.5 mg daily is initiated, gradually titrate up the dosage based on her clinical presentation and monitor of her LV systolic function.  Obviously, her children should be screened for hypertrophic cardiomyopathy.  Dyspnea on exertion and chest tightness: The patient's stress cardiac MRI was reported a medium size of inducible myocardial ischemia in the LAD distribution indicating significant stenosis in LAD.  We discussed the further evaluation and management.  Coronary angiogram with possible PCI is recommended.  Discussed the benefit and possible complications.  After discussion, the patient and her son agreed with the plan.  Coronary angiogram with possible PCI is scheduled on Thursday.    Valvular heart disease: Echocardiogram was reported at least a moderate to severe mitral valve regurgitation which could be related to LVOT obstruction.  Mitral valve regurgitation can be improved with the management of LVOT obstruction discussed as above.  Cardiac MRI indicated mild mitral valve stenosis.  No obvious aortic valve stenosis or regurgitation based on cardiac MRI study.    Benign essential hypertension: Her blood pressure is controlled with metoprolol  succinate to 25 mg daily.  Continue to monitor her blood pressure.  Dyslipidemia: Continue lovastatin 40 mg at bedtime.  Type 2 diabetes, morbid obesity: No change in treatment today.    The patient does not speak English.  Medical history is translated by her son today.    Thank you for the opportunity to be involved in the care of Amalia Harris. If you have any questions, please feel free to contact me.  I will see the patient again in 3 months and as needed    Much or all of the text in this note was generated through the use of Dragon Dictate voice-to-text software. Errors in spelling or words which seem out of context are unintentional.   Sound alike errors, in particular, may have escaped editing.       History of Present Illness:   It is my pleasure to see Amalia Harris at the [facility] for routine cardiology follow-up and discuss cardiac MRI report. Amalia Harris is a 74 year old female with a medical history of hypertrophic cardiomyopathy with LVOT obstruction, type 2 diabetes, benign essential hypertension, dyslipidemia, moderate to severe mitral valve regurgitation with mean gradient 8 mmHg.    The patient was evaluated for shortness of breath on exertion, LVOT obstruction.  She had cardiac MRI with stress and flow on March 26, 2024.  Her stress cardiac MRI was reported a medium size of inducible myocardial ischemia in LAD distribution indicating significant LAD stenosis.  No previous myocardial infarction, scar or infiltrative process, no aortic valve stenosis, no obvious LVOT obstruction even though flow turbulence identified in LVOT, mild aortic valve stenosis with regurgitation, normal right ventricle size and function, moderately enlarged left atrium.    She denies chest pain, however, she complains of intermittent chest tightness that lasted for several minutes.  She developed shortness of breath on exertion which has no significant change over last 6 months.  Complains of occasional  palpitations lasted for 5 minutes.  She had no dizziness, orthopnea, PND or leg edema.  Her weight has been stable.  Her blood pressure and heart rate are controlled.      Past Medical History:     Patient Active Problem List   Diagnosis    Hyperlipidemia LDL goal <100    Major depression    Osteoarthritis    Advance care planning    Intertriginous candidiasis    Severe obesity (BMI 35.0-39.9) with comorbidity (H)    Type 2 diabetes mellitus with microalbuminuria, without long-term current use of insulin (H)    Essential hypertension with goal blood pressure less than 140/90    CHF (congestive heart failure) (H)    Hypocitraturia    Low urine output    Uric acid nephrolithiasis    Benign hypertensive heart disease with congestive heart failure (H)    Calculus of ureter    Hydronephrosis with urinary obstruction due to ureteral calculus    History of COVID-19    History of adenomatous polyp of colon    Posterior vitreous detachment of both eyes    Pseudophakia, ou    Aspiration pneumonia, unspecified aspiration pneumonia type, unspecified laterality, unspecified part of lung (H)       Past Surgical History:     Past Surgical History:   Procedure Laterality Date    CATARACT IOL, RT/LT      CHOLECYSTECTOMY      COLONOSCOPY WITH CO2 INSUFFLATION N/A 02/10/2021    Procedure: COLONOSCOPY, WITH CO2 INSUFFLATION;  Surgeon: Loco Andersen MD;  Location: [facility] OR    GALLBLADDER SURGERY  1986    PHACOEMULSIFICATION WITH STANDARD INTRAOCULAR LENS IMPLANT Right 11/11/2019    Procedure: RIGHT PHACOEMULSIFICATION, CATARACT, WITH STANDARD IOL INSERTION;  Surgeon: Robel Clancy MD;  Location: [facility] OR    PHACOEMULSIFICATION WITH STANDARD INTRAOCULAR LENS IMPLANT Left 11/25/2019    Procedure: LEFT PHACOEMULSIFICATION, CATARACT, WITH STANDARD IOL INSERTION;  Surgeon: Robel Clancy MD;  Location: [facility] OR    AR CYSTO/URETERO W/LITHOTRIPSY &INDWELL STENT INSRT Right 09/29/2017    Procedure: CYSTOSCOPY, RIGHT URETEROSCOPY LASER  LITHOTRIPSY STENT INSERTION;  Surgeon: Dominguez Stoner MD;  Location: [facility];  Service: Urology       Family History:     Family History   Problem Relation Age of Onset    Respiratory Father     Hyperlipidemia Father     Hyperlipidemia Sister     Cancer Brother         throat or esophageal (alcohol)    Hypertension No family hx of     Cerebrovascular Disease No family hx of     Coronary Artery Disease No family hx of     Diabetes No family hx of     Anesthesia Reaction No family hx of     Bleeding Disorder No family hx of     Thrombophilia No family hx of     Asthma Father        Social History:    reports that she has never smoked. She has never used smokeless tobacco. She reports that she does not drink alcohol and does not use drugs.    Review of Systems:   12 systems are reviewed negative except for in HPI.    Meds:     Current Outpatient Medications:     blood glucose (FREESTYLE TEST STRIPS) test strip, 1 strip by In Vitro route daily, Disp: 100 strip, Rfl: 3    blood glucose (NO BRAND SPECIFIED) lancets standard, Use to test blood sugar 1 times daily or as directed., Disp: 100 each, Rfl: 3    Blood Glucose Monitoring Suppl (FREESTYLE LITE) w/Device KIT, USE TO TEST BLOOD SUGAR ONE TIME DAILY OR AS DIRECTED., Disp: , Rfl:     lovastatin (MEVACOR) 40 MG tablet, Take 1 tablet (40 mg) by mouth every evening for cholesterol., Disp: 90 tablet, Rfl: 1    metFORMIN (GLUCOPHAGE XR) 500 MG 24 hr tablet, Take 2 tablets (1,000 mg) by mouth 2 times daily (with meals) for diabetes., Disp: 360 tablet, Rfl: 1    metoprolol succinate ER (TOPROL XL) 25 MG 24 hr tablet, Take 1 tablet (25 mg) by mouth daily for heart failure (and blood pressure)., Disp: 90 tablet, Rfl: 1    potassium citrate (UROCIT-K) 10 MEQ (1080 MG) CR tablet, Take 3 tablets (30 mEq) by mouth 2 times daily for kidney stone prevention., Disp: 540 tablet, Rfl: 3    blood glucose monitoring (ACCU-CHEK FASTCLIX) lancets, U TO TEST D UTD (Patient not  taking: Reported on 2/23/2024), Disp: , Rfl:     blood glucose monitoring (NO BRAND SPECIFIED) meter device kit, Use to test blood sugar 1 times daily or as directed. (Patient not taking: Reported on 2/23/2024), Disp: 1 kit, Rfl: 0    guaiFENesin-codeine (ROBITUSSIN AC) 100-10 MG/5ML solution, Take 5-10 mLs by mouth every 4 hours as needed for cough (may cause drowsiness) . This is a non-refillable prescription. (Patient not taking: Reported on 2/23/2024), Disp: 118 mL, Rfl: 0    lancets 28G MISC, 1 each by Other route (Patient not taking: Reported on 2/23/2024), Disp: , Rfl:      Allergies:   No clinical screening - see comments    Objective:      Physical Exam  75.8 kg (167 lb)     Body mass index is 35.08 kg/m .  BP (!) 158/76 (BP Location: Right arm, Patient Position: Sitting, Cuff Size: Adult Large)   Pulse 55   Wt 75.8 kg (167 lb)   BMI 35.08 kg/m      General Appearance:   Awake, Alert, No acute distress.   HEENT:  Pupil equal, reactive to light. No scleral icterus; the mucous membranes were moist. No oral ulcers or thrush.    Neck: No cervical bruits. No JVD. No thyromegaly. No lymph node enlargement or tenderness.   Chest: The spine was straight. The chest was symmetric.   Lungs:   Respirations unlabored. Lungs are clear to auscultation. No crackles. No wheezing.   Cardiovascular:   RRR, normal first and second heart sounds with II/VI systolic murmurs at RUSB and apex. No rubs or gallops.    Abdomen:  Obese.  Soft. No tenderness. Non-distended. Bowels sounds are present   Extremities: Equal posterior tibial pulses. No leg edema.   Skin: No rashes or ulcers. Warm, Dry.   Musculoskeletal: No tenderness. No deformity.   Neurologic: Mood and affect are appropriate. No focal deficits.         EKG:  Personally reivewed  Sinus tachycardia   Left axis deviation   Minimal voltage criteria for LVH, may be normal variant   Nonspecific ST abnormality   Abnormal ECG   When compared with ECG of 30-OCT-2023 14:09,   No  significant change was found     Cardiac Imaging Studies  Echo on October 31, 2023:  Severe LVOT obstruction with peak gradient of 180 and mean gradient 96 mmHgb.  The left ventricular cavity is small.  Hyperdynamic left ventricular function  There is moderate to severe mitral annular calcification.  There is moderate to severe mitral stenosis.  The left atrium is severely dilated.  Mild (35-45mmHg) pulmonary hypertension is present.  Normal right ventricle size and systolic function.    Stress cardiac MRI with flow study on March 26, 2024:  1.  Pharmacologic regadenoson stress cardiac MRI is abnormal. There is a medium-sized area of inducible  myocardial ischemia in the anteroseptal wall of the left ventricle suggestive of obstructive coronary  artery disease in the territory of the left anterior descending artery. Alternatively, this could be due to  microvascular ischemia from hypertrophied myocardium (as discussed below).  2.  No evidence of prior myocardial infarction, focal nonvascular myocardial fibrosis or infiltrative  disease.  3.  Severe asymmetric hypertrophy of the basal anteroseptal wall of the left ventricle with a maximum wall  thickness of 17 mm. There is evidence of left ventricular outflow tract acceleration but no obstruction or  systolic anterior motion of the mitral apparatus with a peak resting left ventricular outflow gradient of 8  mmHg. Left ventricular cavity size is small and systolic function is hyperdynamic with a quantified left  ventricular ejection fraction is 80%.  4.  Right ventricular chamber size and systolic function are normal.  The quantified right ventricular  ejection fraction is 68%.  5.  Moderate left atrial enlargement.  6.  Severe mitral annular calcification and mild mitral leaflet thickening. Visually there may be mild  mitral stenosis and valve area by planimetry is 1.8 cm^2. There is mild central mitral regurgitation likely  due to fibrodegenerative changes of the  mitral apparatus.  7.  Mild enlargement of the main pulmonary artery which can be seen in pulmonary hypertension.       Lab Review   Lab Results   Component Value Date     10/31/2023     11/30/2020    CO2 27 10/31/2023    CO2 30 07/25/2022    CO2 32 11/30/2020    BUN 17.3 10/31/2023    BUN 39 07/25/2022    BUN 23 11/30/2020     Lab Results   Component Value Date    WBC 10.3 10/31/2023    WBC 10.0 01/29/2021    HGB 10.6 10/31/2023    HGB 13.9 01/29/2021    HCT 33.0 10/31/2023    HCT 43.3 01/29/2021    MCV 91 10/31/2023    MCV 93 01/29/2021     10/31/2023     01/29/2021     Lab Results   Component Value Date    CHOL 160 01/30/2023    CHOL 160 11/30/2020    TRIG 256 01/30/2023    TRIG 201 11/30/2020    HDL 49 01/30/2023    HDL 63 11/30/2020    LDL 60 01/30/2023    LDL 57 11/30/2020     LDL Cholesterol Calculated   Date Value Ref Range Status   01/30/2023 60 <=100 mg/dL Final   11/30/2020 57 <100 mg/dL Final     Comment:     Desirable:       <100 mg/dl     Lab Results   Component Value Date    TSH 2.09 09/12/2018

## 2024-04-08 NOTE — PROGRESS NOTES
Click to link to [facility] HEART CARE NOTE       Assessment/Plan:   Hypertrophic cardiomyopathy with LVOT obstruction: The patient developed shortness of breath on exertion.  Her echocardiogram was reported significant LVOT obstruction with the peak LVOT gradient 108 mmHg, mean gradient 96 mmHg.  Cardiac MRI did not identify significant LVOT obstruction which could be underestimated.  Her symptoms dyspnea on exertion could be secondary to LVOT obstruction.  Her cardiac MRI was reported LV hyperdynamic, LVEF 80%.  We discussed evaluation and management.  The patient is on metoprolol succinate 25 mg daily.  Her ventricular rate is well-controlled between 55 and 60% at rest.  To better relieve her dyspnea on exertion, mavacamten 2.5 mg daily is initiated, gradually titrate up the dosage based on her clinical presentation and monitor of her LV systolic function.  Obviously, her children should be screened for hypertrophic cardiomyopathy.  Dyspnea on exertion and chest tightness: The patient's stress cardiac MRI was reported a medium size of inducible myocardial ischemia in the LAD distribution indicating significant stenosis in LAD.  We discussed the further evaluation and management.  Coronary angiogram with possible PCI is recommended.  Discussed the benefit and possible complications.  After discussion, the patient and her son agreed with the plan.  Coronary angiogram with possible PCI is scheduled on Thursday.    Valvular heart disease: Echocardiogram was reported at least a moderate to severe mitral valve regurgitation which could be related to LVOT obstruction.  Mitral valve regurgitation can be improved with the management of LVOT obstruction discussed as above.  Cardiac MRI indicated mild mitral valve stenosis.  No obvious aortic valve stenosis or regurgitation based on cardiac MRI study.    Benign essential hypertension: Her blood pressure is controlled with metoprolol  succinate to 25 mg daily.  Continue to monitor her blood pressure.  Dyslipidemia: Continue lovastatin 40 mg at bedtime.  Type 2 diabetes, morbid obesity: No change in treatment today.    The patient does not speak English.  Medical history is translated by her son today.    Thank you for the opportunity to be involved in the care of Amalia Harris. If you have any questions, please feel free to contact me.  I will see the patient again in 3 months and as needed    Much or all of the text in this note was generated through the use of Dragon Dictate voice-to-text software. Errors in spelling or words which seem out of context are unintentional.   Sound alike errors, in particular, may have escaped editing.       History of Present Illness:   It is my pleasure to see Amalia Harris at the [facility] clinic for routine cardiology follow-up and discuss cardiac MRI report. Amalia Harris is a 74 year old female with a medical history of hypertrophic cardiomyopathy with LVOT obstruction, type 2 diabetes, benign essential hypertension, dyslipidemia, moderate to severe mitral valve regurgitation with mean gradient 8 mmHg.    The patient was evaluated for shortness of breath on exertion, LVOT obstruction.  She had cardiac MRI with stress and flow on March 26, 2024.  Her stress cardiac MRI was reported a medium size of inducible myocardial ischemia in LAD distribution indicating significant LAD stenosis.  No previous myocardial infarction, scar or infiltrative process, no aortic valve stenosis, no obvious LVOT obstruction even though flow turbulence identified in LVOT, mild aortic valve stenosis with regurgitation, normal right ventricle size and function, moderately enlarged left atrium.    She denies chest pain, however, she complains of intermittent chest tightness that lasted for several minutes.  She developed shortness of breath on exertion which has no significant change over last 6 months.  Complains of occasional  palpitations lasted for 5 minutes.  She had no dizziness, orthopnea, PND or leg edema.  Her weight has been stable.  Her blood pressure and heart rate are controlled.      Past Medical History:     Patient Active Problem List   Diagnosis    Hyperlipidemia LDL goal <100    Major depression    Osteoarthritis    Advance care planning    Intertriginous candidiasis    Severe obesity (BMI 35.0-39.9) with comorbidity (H)    Type 2 diabetes mellitus with microalbuminuria, without long-term current use of insulin (H)    Essential hypertension with goal blood pressure less than 140/90    CHF (congestive heart failure) (H)    Hypocitraturia    Low urine output    Uric acid nephrolithiasis    Benign hypertensive heart disease with congestive heart failure (H)    Calculus of ureter    Hydronephrosis with urinary obstruction due to ureteral calculus    History of COVID-19    History of adenomatous polyp of colon    Posterior vitreous detachment of both eyes    Pseudophakia, ou    Aspiration pneumonia, unspecified aspiration pneumonia type, unspecified laterality, unspecified part of lung (H)       Past Surgical History:     Past Surgical History:   Procedure Laterality Date    CATARACT IOL, RT/LT      CHOLECYSTECTOMY      COLONOSCOPY WITH CO2 INSUFFLATION N/A 02/10/2021    Procedure: COLONOSCOPY, WITH CO2 INSUFFLATION;  Surgeon: Loco Andersen MD;  Location: [facility] OR    GALLBLADDER SURGERY  1986    PHACOEMULSIFICATION WITH STANDARD INTRAOCULAR LENS IMPLANT Right 11/11/2019    Procedure: RIGHT PHACOEMULSIFICATION, CATARACT, WITH STANDARD IOL INSERTION;  Surgeon: Robel Clancy MD;  Location: [facility] OR    PHACOEMULSIFICATION WITH STANDARD INTRAOCULAR LENS IMPLANT Left 11/25/2019    Procedure: LEFT PHACOEMULSIFICATION, CATARACT, WITH STANDARD IOL INSERTION;  Surgeon: Robel Clancy MD;  Location: [facility] OR    IN CYSTO/URETERO W/LITHOTRIPSY &INDWELL STENT INSRT Right 09/29/2017    Procedure: CYSTOSCOPY, RIGHT URETEROSCOPY LASER  LITHOTRIPSY STENT INSERTION;  Surgeon: Dominguez Stoner MD;  Location: [facility];  Service: Urology       Family History:     Family History   Problem Relation Age of Onset    Respiratory Father     Hyperlipidemia Father     Hyperlipidemia Sister     Cancer Brother         throat or esophageal (alcohol)    Hypertension No family hx of     Cerebrovascular Disease No family hx of     Coronary Artery Disease No family hx of     Diabetes No family hx of     Anesthesia Reaction No family hx of     Bleeding Disorder No family hx of     Thrombophilia No family hx of     Asthma Father        Social History:    reports that she has never smoked. She has never used smokeless tobacco. She reports that she does not drink alcohol and does not use drugs.    Review of Systems:   12 systems are reviewed negative except for in HPI.    Meds:     Current Outpatient Medications:     blood glucose (FREESTYLE TEST STRIPS) test strip, 1 strip by In Vitro route daily, Disp: 100 strip, Rfl: 3    blood glucose (NO BRAND SPECIFIED) lancets standard, Use to test blood sugar 1 times daily or as directed., Disp: 100 each, Rfl: 3    Blood Glucose Monitoring Suppl (FREESTYLE LITE) w/Device KIT, USE TO TEST BLOOD SUGAR ONE TIME DAILY OR AS DIRECTED., Disp: , Rfl:     lovastatin (MEVACOR) 40 MG tablet, Take 1 tablet (40 mg) by mouth every evening for cholesterol., Disp: 90 tablet, Rfl: 1    metFORMIN (GLUCOPHAGE XR) 500 MG 24 hr tablet, Take 2 tablets (1,000 mg) by mouth 2 times daily (with meals) for diabetes., Disp: 360 tablet, Rfl: 1    metoprolol succinate ER (TOPROL XL) 25 MG 24 hr tablet, Take 1 tablet (25 mg) by mouth daily for heart failure (and blood pressure)., Disp: 90 tablet, Rfl: 1    potassium citrate (UROCIT-K) 10 MEQ (1080 MG) CR tablet, Take 3 tablets (30 mEq) by mouth 2 times daily for kidney stone prevention., Disp: 540 tablet, Rfl: 3    blood glucose monitoring (ACCU-CHEK FASTCLIX) lancets, U TO TEST D UTD (Patient not  taking: Reported on 2/23/2024), Disp: , Rfl:     blood glucose monitoring (NO BRAND SPECIFIED) meter device kit, Use to test blood sugar 1 times daily or as directed. (Patient not taking: Reported on 2/23/2024), Disp: 1 kit, Rfl: 0    guaiFENesin-codeine (ROBITUSSIN AC) 100-10 MG/5ML solution, Take 5-10 mLs by mouth every 4 hours as needed for cough (may cause drowsiness) . This is a non-refillable prescription. (Patient not taking: Reported on 2/23/2024), Disp: 118 mL, Rfl: 0    lancets 28G MISC, 1 each by Other route (Patient not taking: Reported on 2/23/2024), Disp: , Rfl:      Allergies:   No clinical screening - see comments    Objective:      Physical Exam  75.8 kg (167 lb)     Body mass index is 35.08 kg/m .  BP (!) 158/76 (BP Location: Right arm, Patient Position: Sitting, Cuff Size: Adult Large)   Pulse 55   Wt 75.8 kg (167 lb)   BMI 35.08 kg/m      General Appearance:   Awake, Alert, No acute distress.   HEENT:  Pupil equal, reactive to light. No scleral icterus; the mucous membranes were moist. No oral ulcers or thrush.    Neck: No cervical bruits. No JVD. No thyromegaly. No lymph node enlargement or tenderness.   Chest: The spine was straight. The chest was symmetric.   Lungs:   Respirations unlabored. Lungs are clear to auscultation. No crackles. No wheezing.   Cardiovascular:   RRR, normal first and second heart sounds with II/VI systolic murmurs at RUSB and apex. No rubs or gallops.    Abdomen:  Obese.  Soft. No tenderness. Non-distended. Bowels sounds are present   Extremities: Equal posterior tibial pulses. No leg edema.   Skin: No rashes or ulcers. Warm, Dry.   Musculoskeletal: No tenderness. No deformity.   Neurologic: Mood and affect are appropriate. No focal deficits.         EKG:  Personally reivewed  Sinus tachycardia   Left axis deviation   Minimal voltage criteria for LVH, may be normal variant   Nonspecific ST abnormality   Abnormal ECG   When compared with ECG of 30-OCT-2023 14:09,   No  significant change was found     Cardiac Imaging Studies  Echo on October 31, 2023:  Severe LVOT obstruction with peak gradient of 180 and mean gradient 96 mmHgb.  The left ventricular cavity is small.  Hyperdynamic left ventricular function  There is moderate to severe mitral annular calcification.  There is moderate to severe mitral stenosis.  The left atrium is severely dilated.  Mild (35-45mmHg) pulmonary hypertension is present.  Normal right ventricle size and systolic function.    Stress cardiac MRI with flow study on March 26, 2024:  1.  Pharmacologic regadenoson stress cardiac MRI is abnormal. There is a medium-sized area of inducible  myocardial ischemia in the anteroseptal wall of the left ventricle suggestive of obstructive coronary  artery disease in the territory of the left anterior descending artery. Alternatively, this could be due to  microvascular ischemia from hypertrophied myocardium (as discussed below).  2.  No evidence of prior myocardial infarction, focal nonvascular myocardial fibrosis or infiltrative  disease.  3.  Severe asymmetric hypertrophy of the basal anteroseptal wall of the left ventricle with a maximum wall  thickness of 17 mm. There is evidence of left ventricular outflow tract acceleration but no obstruction or  systolic anterior motion of the mitral apparatus with a peak resting left ventricular outflow gradient of 8  mmHg. Left ventricular cavity size is small and systolic function is hyperdynamic with a quantified left  ventricular ejection fraction is 80%.  4.  Right ventricular chamber size and systolic function are normal.  The quantified right ventricular  ejection fraction is 68%.  5.  Moderate left atrial enlargement.  6.  Severe mitral annular calcification and mild mitral leaflet thickening. Visually there may be mild  mitral stenosis and valve area by planimetry is 1.8 cm^2. There is mild central mitral regurgitation likely  due to fibrodegenerative changes of the  mitral apparatus.  7.  Mild enlargement of the main pulmonary artery which can be seen in pulmonary hypertension.       Lab Review   Lab Results   Component Value Date     10/31/2023     11/30/2020    CO2 27 10/31/2023    CO2 30 07/25/2022    CO2 32 11/30/2020    BUN 17.3 10/31/2023    BUN 39 07/25/2022    BUN 23 11/30/2020     Lab Results   Component Value Date    WBC 10.3 10/31/2023    WBC 10.0 01/29/2021    HGB 10.6 10/31/2023    HGB 13.9 01/29/2021    HCT 33.0 10/31/2023    HCT 43.3 01/29/2021    MCV 91 10/31/2023    MCV 93 01/29/2021     10/31/2023     01/29/2021     Lab Results   Component Value Date    CHOL 160 01/30/2023    CHOL 160 11/30/2020    TRIG 256 01/30/2023    TRIG 201 11/30/2020    HDL 49 01/30/2023    HDL 63 11/30/2020    LDL 60 01/30/2023    LDL 57 11/30/2020     LDL Cholesterol Calculated   Date Value Ref Range Status   01/30/2023 60 <=100 mg/dL Final   11/30/2020 57 <100 mg/dL Final     Comment:     Desirable:       <100 mg/dl     Lab Results   Component Value Date    TSH 2.09 09/12/2018

## 2024-04-08 NOTE — LETTER
4/8/2024    Terry Sanchez MD  [address]  [place] [address]    RE: Amalia Steven       Dear Colleague,     I had the pleasure of seeing Amalia Harris in the [facility].      Click to link to [facility] HEART CARE NOTE       Assessment/Plan:   Hypertrophic cardiomyopathy with LVOT obstruction: The patient developed shortness of breath on exertion.  Her echocardiogram was reported significant LVOT obstruction with the peak LVOT gradient 108 mmHg, mean gradient 96 mmHg.  Cardiac MRI did not identify significant LVOT obstruction which could be underestimated.  Her symptoms dyspnea on exertion could be secondary to LVOT obstruction.  Her cardiac MRI was reported LV hyperdynamic, LVEF 80%.  We discussed evaluation and management.  The patient is on metoprolol succinate 25 mg daily.  Her ventricular rate is well-controlled between 55 and 60% at rest.  To better relieve her dyspnea on exertion, mavacamten 2.5 mg daily is initiated, gradually titrate up the dosage based on her clinical presentation and monitor of her LV systolic function.  Obviously, her children should be screened for hypertrophic cardiomyopathy.  Dyspnea on exertion and chest tightness: The patient's stress cardiac MRI was reported a medium size of inducible myocardial ischemia in the LAD distribution indicating significant stenosis in LAD.  We discussed the further evaluation and management.  Coronary angiogram with possible PCI is recommended.  Discussed the benefit and possible complications.  After discussion, the patient and her son agreed with the plan.  Coronary angiogram with possible PCI is scheduled on Thursday.    Valvular heart disease: Echocardiogram was reported at least a moderate to severe mitral valve regurgitation which could be related to LVOT obstruction.  Mitral valve regurgitation can be improved with the management of LVOT obstruction discussed as above.  Cardiac MRI indicated  mild mitral valve stenosis.  No obvious aortic valve stenosis or regurgitation based on cardiac MRI study.    Benign essential hypertension: Her blood pressure is controlled with metoprolol succinate to 25 mg daily.  Continue to monitor her blood pressure.  Dyslipidemia: Continue lovastatin 40 mg at bedtime.  Type 2 diabetes, morbid obesity: No change in treatment today.    The patient does not speak English.  Medical history is translated by her son today.    Thank you for the opportunity to be involved in the care of Amalia Harris. If you have any questions, please feel free to contact me.  I will see the patient again in 3 months and as needed    Much or all of the text in this note was generated through the use of Dragon Dictate voice-to-text software. Errors in spelling or words which seem out of context are unintentional.   Sound alike errors, in particular, may have escaped editing.       History of Present Illness:   It is my pleasure to see Amalia Harris at the [facility] clinic for routine cardiology follow-up and discuss cardiac MRI report. Amalia Harris is a 74 year old female with a medical history of hypertrophic cardiomyopathy with LVOT obstruction, type 2 diabetes, benign essential hypertension, dyslipidemia, moderate to severe mitral valve regurgitation with mean gradient 8 mmHg.    The patient was evaluated for shortness of breath on exertion, LVOT obstruction.  She had cardiac MRI with stress and flow on March 26, 2024.  Her stress cardiac MRI was reported a medium size of inducible myocardial ischemia in LAD distribution indicating significant LAD stenosis.  No previous myocardial infarction, scar or infiltrative process, no aortic valve stenosis, no obvious LVOT obstruction even though flow turbulence identified in LVOT, mild aortic valve stenosis with regurgitation, normal right ventricle size and function, moderately enlarged left atrium.    She denies chest pain, however, she complains of  intermittent chest tightness that lasted for several minutes.  She developed shortness of breath on exertion which has no significant change over last 6 months.  Complains of occasional palpitations lasted for 5 minutes.  She had no dizziness, orthopnea, PND or leg edema.  Her weight has been stable.  Her blood pressure and heart rate are controlled.      Past Medical History:     Patient Active Problem List   Diagnosis    Hyperlipidemia LDL goal <100    Major depression    Osteoarthritis    Advance care planning    Intertriginous candidiasis    Severe obesity (BMI 35.0-39.9) with comorbidity (H)    Type 2 diabetes mellitus with microalbuminuria, without long-term current use of insulin (H)    Essential hypertension with goal blood pressure less than 140/90    CHF (congestive heart failure) (H)    Hypocitraturia    Low urine output    Uric acid nephrolithiasis    Benign hypertensive heart disease with congestive heart failure (H)    Calculus of ureter    Hydronephrosis with urinary obstruction due to ureteral calculus    History of COVID-19    History of adenomatous polyp of colon    Posterior vitreous detachment of both eyes    Pseudophakia, ou    Aspiration pneumonia, unspecified aspiration pneumonia type, unspecified laterality, unspecified part of lung (H)       Past Surgical History:     Past Surgical History:   Procedure Laterality Date    CATARACT IOL, RT/LT      CHOLECYSTECTOMY      COLONOSCOPY WITH CO2 INSUFFLATION N/A 02/10/2021    Procedure: COLONOSCOPY, WITH CO2 INSUFFLATION;  Surgeon: Loco Andersen MD;  Location: [facility] OR    GALLBLADDER SURGERY  1986    PHACOEMULSIFICATION WITH STANDARD INTRAOCULAR LENS IMPLANT Right 11/11/2019    Procedure: RIGHT PHACOEMULSIFICATION, CATARACT, WITH STANDARD IOL INSERTION;  Surgeon: Robel Clancy MD;  Location: [facility] OR    PHACOEMULSIFICATION WITH STANDARD INTRAOCULAR LENS IMPLANT Left 11/25/2019    Procedure: LEFT PHACOEMULSIFICATION, CATARACT, WITH STANDARD IOL  INSERTION;  Surgeon: Robel Clancy MD;  Location:  OR    PA CYSTO/URETERO W/LITHOTRIPSY &INDWELL STENT INSRT Right 09/29/2017    Procedure: CYSTOSCOPY, RIGHT URETEROSCOPY LASER LITHOTRIPSY STENT INSERTION;  Surgeon: Dominguez Stoner MD;  Location: [facility];  Service: Urology       Family History:     Family History   Problem Relation Age of Onset    Respiratory Father     Hyperlipidemia Father     Hyperlipidemia Sister     Cancer Brother         throat or esophageal (alcohol)    Hypertension No family hx of     Cerebrovascular Disease No family hx of     Coronary Artery Disease No family hx of     Diabetes No family hx of     Anesthesia Reaction No family hx of     Bleeding Disorder No family hx of     Thrombophilia No family hx of     Asthma Father        Social History:    reports that she has never smoked. She has never used smokeless tobacco. She reports that she does not drink alcohol and does not use drugs.    Review of Systems:   12 systems are reviewed negative except for in HPI.    Meds:     Current Outpatient Medications:     blood glucose (FREESTYLE TEST STRIPS) test strip, 1 strip by In Vitro route daily, Disp: 100 strip, Rfl: 3    blood glucose (NO BRAND SPECIFIED) lancets standard, Use to test blood sugar 1 times daily or as directed., Disp: 100 each, Rfl: 3    Blood Glucose Monitoring Suppl (FREESTYLE LITE) w/Device KIT, USE TO TEST BLOOD SUGAR ONE TIME DAILY OR AS DIRECTED., Disp: , Rfl:     lovastatin (MEVACOR) 40 MG tablet, Take 1 tablet (40 mg) by mouth every evening for cholesterol., Disp: 90 tablet, Rfl: 1    metFORMIN (GLUCOPHAGE XR) 500 MG 24 hr tablet, Take 2 tablets (1,000 mg) by mouth 2 times daily (with meals) for diabetes., Disp: 360 tablet, Rfl: 1    metoprolol succinate ER (TOPROL XL) 25 MG 24 hr tablet, Take 1 tablet (25 mg) by mouth daily for heart failure (and blood pressure)., Disp: 90 tablet, Rfl: 1    potassium citrate (UROCIT-K) 10 MEQ (1080 MG) CR tablet,  Take 3 tablets (30 mEq) by mouth 2 times daily for kidney stone prevention., Disp: 540 tablet, Rfl: 3    blood glucose monitoring (ACCU-CHEK FASTCLIX) lancets, U TO TEST D UTD (Patient not taking: Reported on 2/23/2024), Disp: , Rfl:     blood glucose monitoring (NO BRAND SPECIFIED) meter device kit, Use to test blood sugar 1 times daily or as directed. (Patient not taking: Reported on 2/23/2024), Disp: 1 kit, Rfl: 0    guaiFENesin-codeine (ROBITUSSIN AC) 100-10 MG/5ML solution, Take 5-10 mLs by mouth every 4 hours as needed for cough (may cause drowsiness) . This is a non-refillable prescription. (Patient not taking: Reported on 2/23/2024), Disp: 118 mL, Rfl: 0    lancets 28G MISC, 1 each by Other route (Patient not taking: Reported on 2/23/2024), Disp: , Rfl:      Allergies:   No clinical screening - see comments    Objective:      Physical Exam  75.8 kg (167 lb)     Body mass index is 35.08 kg/m .  BP (!) 158/76 (BP Location: Right arm, Patient Position: Sitting, Cuff Size: Adult Large)   Pulse 55   Wt 75.8 kg (167 lb)   BMI 35.08 kg/m      General Appearance:   Awake, Alert, No acute distress.   HEENT:  Pupil equal, reactive to light. No scleral icterus; the mucous membranes were moist. No oral ulcers or thrush.    Neck: No cervical bruits. No JVD. No thyromegaly. No lymph node enlargement or tenderness.   Chest: The spine was straight. The chest was symmetric.   Lungs:   Respirations unlabored. Lungs are clear to auscultation. No crackles. No wheezing.   Cardiovascular:   RRR, normal first and second heart sounds with II/VI systolic murmurs at RUSB and apex. No rubs or gallops.    Abdomen:  Obese.  Soft. No tenderness. Non-distended. Bowels sounds are present   Extremities: Equal posterior tibial pulses. No leg edema.   Skin: No rashes or ulcers. Warm, Dry.   Musculoskeletal: No tenderness. No deformity.   Neurologic: Mood and affect are appropriate. No focal deficits.         EKG:  Personally  reivewed  Sinus tachycardia   Left axis deviation   Minimal voltage criteria for LVH, may be normal variant   Nonspecific ST abnormality   Abnormal ECG   When compared with ECG of 30-OCT-2023 14:09,   No significant change was found     Cardiac Imaging Studies  Echo on October 31, 2023:  Severe LVOT obstruction with peak gradient of 180 and mean gradient 96 mmHgb.  The left ventricular cavity is small.  Hyperdynamic left ventricular function  There is moderate to severe mitral annular calcification.  There is moderate to severe mitral stenosis.  The left atrium is severely dilated.  Mild (35-45mmHg) pulmonary hypertension is present.  Normal right ventricle size and systolic function.    Stress cardiac MRI with flow study on March 26, 2024:  1.  Pharmacologic regadenoson stress cardiac MRI is abnormal. There is a medium-sized area of inducible  myocardial ischemia in the anteroseptal wall of the left ventricle suggestive of obstructive coronary  artery disease in the territory of the left anterior descending artery. Alternatively, this could be due to  microvascular ischemia from hypertrophied myocardium (as discussed below).  2.  No evidence of prior myocardial infarction, focal nonvascular myocardial fibrosis or infiltrative  disease.  3.  Severe asymmetric hypertrophy of the basal anteroseptal wall of the left ventricle with a maximum wall  thickness of 17 mm. There is evidence of left ventricular outflow tract acceleration but no obstruction or  systolic anterior motion of the mitral apparatus with a peak resting left ventricular outflow gradient of 8  mmHg. Left ventricular cavity size is small and systolic function is hyperdynamic with a quantified left  ventricular ejection fraction is 80%.  4.  Right ventricular chamber size and systolic function are normal.  The quantified right ventricular  ejection fraction is 68%.  5.  Moderate left atrial enlargement.  6.  Severe mitral annular calcification and mild  mitral leaflet thickening. Visually there may be mild  mitral stenosis and valve area by planimetry is 1.8 cm^2. There is mild central mitral regurgitation likely  due to fibrodegenerative changes of the mitral apparatus.  7.  Mild enlargement of the main pulmonary artery which can be seen in pulmonary hypertension.       Lab Review   Lab Results   Component Value Date     10/31/2023     11/30/2020    CO2 27 10/31/2023    CO2 30 07/25/2022    CO2 32 11/30/2020    BUN 17.3 10/31/2023    BUN 39 07/25/2022    BUN 23 11/30/2020     Lab Results   Component Value Date    WBC 10.3 10/31/2023    WBC 10.0 01/29/2021    HGB 10.6 10/31/2023    HGB 13.9 01/29/2021    HCT 33.0 10/31/2023    HCT 43.3 01/29/2021    MCV 91 10/31/2023    MCV 93 01/29/2021     10/31/2023     01/29/2021     Lab Results   Component Value Date    CHOL 160 01/30/2023    CHOL 160 11/30/2020    TRIG 256 01/30/2023    TRIG 201 11/30/2020    HDL 49 01/30/2023    HDL 63 11/30/2020    LDL 60 01/30/2023    LDL 57 11/30/2020     LDL Cholesterol Calculated   Date Value Ref Range Status   01/30/2023 60 <=100 mg/dL Final   11/30/2020 57 <100 mg/dL Final     Comment:     Desirable:       <100 mg/dl     Lab Results   Component Value Date    TSH 2.09 09/12/2018                   Thank you for allowing me to participate in the care of your patient.      Sincerely,     Luke Bentley MD     [facility]  cc:   Luke Bentley MD  [address]  [place] [address]

## 2024-04-09 ENCOUNTER — TELEPHONE (OUTPATIENT)
Dept: CARDIOLOGY | Facility: CLINIC | Age: 74
End: 2024-04-09
Payer: MEDICARE

## 2024-04-09 NOTE — TELEPHONE ENCOUNTER
PA Initiation    Medication: CAMZYOS 2.5 MG PO CAPS  Insurance Company: [employer] Part D - Phone [phone] Fax [phone]  Pharmacy Filling the Rx: [facility] [place] [facility]  Filling Pharmacy Phone:    Filling Pharmacy Fax:    Start Date: 4/9/2024        Thank you,    Krystal Dykes [facility]  Specialty Pharmacy Clinic Liaison - [facility] [address]  [address] [place] [address]  Ph: [phone] Fax: [phone]  [email or web address]

## 2024-04-10 ENCOUNTER — PREP FOR PROCEDURE (OUTPATIENT)
Dept: CARDIOLOGY | Facility: CLINIC | Age: 74
End: 2024-04-10
Payer: MEDICARE

## 2024-04-10 ENCOUNTER — DOCUMENTATION ONLY (OUTPATIENT)
Dept: CARDIOLOGY | Facility: CLINIC | Age: 74
End: 2024-04-10
Payer: MEDICARE

## 2024-04-10 DIAGNOSIS — R94.39 POSITIVE CARDIAC STRESS TEST: ICD-10-CM

## 2024-04-10 DIAGNOSIS — Q24.8 LEFT VENTRICULAR OUTFLOW TRACT OBSTRUCTION: ICD-10-CM

## 2024-04-10 DIAGNOSIS — I42.2 HYPERTROPHIC CARDIOMYOPATHY (H): ICD-10-CM

## 2024-04-10 DIAGNOSIS — R06.09 DOE (DYSPNEA ON EXERTION): Primary | ICD-10-CM

## 2024-04-10 RX ORDER — ASPIRIN 325 MG
325 TABLET ORAL ONCE
Status: CANCELLED | OUTPATIENT
Start: 2024-04-11 | End: 2024-04-10

## 2024-04-10 RX ORDER — NICOTINE POLACRILEX 4 MG
15-30 LOZENGE BUCCAL
Status: CANCELLED | OUTPATIENT
Start: 2024-04-11

## 2024-04-10 RX ORDER — DEXTROSE MONOHYDRATE 25 G/50ML
25-50 INJECTION, SOLUTION INTRAVENOUS
Status: CANCELLED | OUTPATIENT
Start: 2024-04-11

## 2024-04-10 RX ORDER — LIDOCAINE 40 MG/G
CREAM TOPICAL
Status: CANCELLED | OUTPATIENT
Start: 2024-04-10

## 2024-04-10 RX ORDER — ASPIRIN 81 MG/1
243 TABLET, CHEWABLE ORAL ONCE
Status: CANCELLED | OUTPATIENT
Start: 2024-04-11

## 2024-04-10 RX ORDER — FENTANYL CITRATE 50 UG/ML
25 INJECTION, SOLUTION INTRAMUSCULAR; INTRAVENOUS
Status: CANCELLED | OUTPATIENT
Start: 2024-04-11

## 2024-04-10 RX ORDER — SODIUM CHLORIDE 9 MG/ML
INJECTION, SOLUTION INTRAVENOUS CONTINUOUS
Status: CANCELLED | OUTPATIENT
Start: 2024-04-11

## 2024-04-10 NOTE — TELEPHONE ENCOUNTER
Prior Authorization Approval    Medication: CAMZYOS 2.5 MG PO CAPS  Authorization Effective Date: 1/1/2024  Authorization Expiration Date: 4/9/2025  Approved Dose/Quantity: 30 days  Reference #:     Insurance Company: [employer] Part D - Phone [phone] Fax [phone]  Expected CoPay: $ 11.2  CoPay Card Available:      Financial Assistance Needed:   Which Pharmacy is filling the prescription: [facility]  Pharmacy Notified: Yes  Patient Notified: Yes          Thank you,    Krystal Dykes [facility]  Specialty Pharmacy Clinic Liaison - CardiologyNeurologyMultiple Sclerosis  [facility] [address] [address]  Ph: [phone] Fax: [phone]  [email or web address]

## 2024-04-10 NOTE — PROGRESS NOTES
Amalia Steven  [address]  [place] [address]  [phone] (home) [phone] (work)    Procedure cardiologist:  ASHLEY  PCP:  Terry Sanchez  H&P completed by:  KINJAL 4/9  Admit date 4/11  Arrival time:  1100  Anticoagulation: None  CPAP: No  Previous PCI: No  Bypass Grafts: No  Renal Issues: No  Diabetic?: Yes  Device?: No  Type:  na  Ambulation status: indep    Patient Education/  Angiogram Teaching    Reason for Visit:  Patient seen for pre-procedure education in preparation for: CA/PPCI      Procedure Prep:  Primary Cardiologist note dated: 4/9 KINJAL  EKG results obtained, dated: on admission  Hemogram results obtained: on admission  Basic Metabolic Panel results obtained: on admission  Lipid Profile results obtained: 1/30/24  Pertinent cardiac test results: cardiac MRI        Pre-procedure instructions  Patient instructed to be NPO per anesthesia guidelines.  Patient instructed to shower the evening before or the morning of the procedure.  Patient instructed to arrange for transportation home following procedure from a responsible family member of friend. No driving for at least 24 hours.  Patient instructed to have a responsible adult with them for 24 hours post-procedure.  Post-procedure follow up process.  Conscious sedation discussed.    Pre-procedure medication instructions  Patient instructed on antiplatelet medication.  Continue medications as scheduled, with a small amount of water on the day of the procedure unless indicated.  Patient instructed to take 324 mg of Aspirin am of procedure: Yes  Other medication: instructed to hold metformin the a.m. of the procedure. Ok to take other meds as usual.      Patient states understanding of procedure and agrees to proceed.    *PATIENTS RECORDS AVAILABLE IN [facility] UNLESS OTHERWISE INDICATED*      Patient Active Problem List   Diagnosis    Hyperlipidemia LDL goal <100    Major depression    Osteoarthritis    Advance care planning    Intertriginous candidiasis     Severe obesity (BMI 35.0-39.9) with comorbidity (H)    Type 2 diabetes mellitus with microalbuminuria, without long-term current use of insulin (H)    Essential hypertension with goal blood pressure less than 140/90    CHF (congestive heart failure) (H)    Hypocitraturia    Low urine output    Uric acid nephrolithiasis    Benign hypertensive heart disease with congestive heart failure (H)    Calculus of ureter    Hydronephrosis with urinary obstruction due to ureteral calculus    History of COVID-19    History of adenomatous polyp of colon    Posterior vitreous detachment of both eyes    Pseudophakia, ou    Aspiration pneumonia, unspecified aspiration pneumonia type, unspecified laterality, unspecified part of lung (H)    Hypertrophic cardiomyopathy (H)    Left ventricular outflow tract obstruction    Mild to moderate mitral valve stenosis    Moderate to severe mitral valve regurgitation       Current Outpatient Medications   Medication Sig Dispense Refill    blood glucose (FREESTYLE TEST STRIPS) test strip 1 strip by In Vitro route daily 100 strip 3    blood glucose (NO BRAND SPECIFIED) lancets standard Use to test blood sugar 1 times daily or as directed. 100 each 3    blood glucose monitoring (ACCU-CHEK FASTCLIX) lancets U TO TEST D UTD (Patient not taking: Reported on 2/23/2024)      blood glucose monitoring (NO BRAND SPECIFIED) meter device kit Use to test blood sugar 1 times daily or as directed. (Patient not taking: Reported on 2/23/2024) 1 kit 0    Blood Glucose Monitoring Suppl (FREESTYLE LITE) w/Device KIT USE TO TEST BLOOD SUGAR ONE TIME DAILY OR AS DIRECTED.      guaiFENesin-codeine (ROBITUSSIN AC) 100-10 MG/5ML solution Take 5-10 mLs by mouth every 4 hours as needed for cough (may cause drowsiness) . This is a non-refillable prescription. (Patient not taking: Reported on 2/23/2024) 118 mL 0    lancets 28G MISC 1 each by Other route (Patient not taking: Reported on 2/23/2024)      lovastatin (MEVACOR) 40  MG tablet Take 1 tablet (40 mg) by mouth every evening for cholesterol. 90 tablet 1    mavacamten (CAMZYOS) 2.5 MG CAPS Take 2.5 mg by mouth daily 30 capsule 5    metFORMIN (GLUCOPHAGE XR) 500 MG 24 hr tablet Take 2 tablets (1,000 mg) by mouth 2 times daily (with meals) for diabetes. 360 tablet 1    metoprolol succinate ER (TOPROL XL) 25 MG 24 hr tablet Take 1 tablet (25 mg) by mouth daily for heart failure (and blood pressure). 90 tablet 1    potassium citrate (UROCIT-K) 10 MEQ (1080 MG) CR tablet Take 3 tablets (30 mEq) by mouth 2 times daily for kidney stone prevention. 540 tablet 3       Allergies   Allergen Reactions    No Clinical Screening - See Comments Rash     Tele patches         Idania Diaz RN

## 2024-04-11 ENCOUNTER — HOSPITAL ENCOUNTER (OUTPATIENT)
Facility: HOSPITAL | Age: 74
Discharge: HOME | End: 2024-04-11
Attending: INTERNAL MEDICINE | Admitting: INTERNAL MEDICINE
Payer: MEDICARE

## 2024-04-11 VITALS
DIASTOLIC BLOOD PRESSURE: 77 MMHG | BODY MASS INDEX: 36.68 KG/M2 | HEIGHT: 57 IN | RESPIRATION RATE: 18 BRPM | SYSTOLIC BLOOD PRESSURE: 149 MMHG | WEIGHT: 170 LBS | TEMPERATURE: 97.9 F | OXYGEN SATURATION: 99 % | HEART RATE: 54 BPM

## 2024-04-11 DIAGNOSIS — R94.39 POSITIVE CARDIAC STRESS TEST: ICD-10-CM

## 2024-04-11 DIAGNOSIS — R06.09 DOE (DYSPNEA ON EXERTION): ICD-10-CM

## 2024-04-11 DIAGNOSIS — I42.2 HYPERTROPHIC CARDIOMYOPATHY (H): ICD-10-CM

## 2024-04-11 DIAGNOSIS — Q24.8 LEFT VENTRICULAR OUTFLOW TRACT OBSTRUCTION: ICD-10-CM

## 2024-04-11 DIAGNOSIS — Z95.5 STATUS POST INSERTION OF DRUG-ELUTING STENT INTO LEFT ANTERIOR DESCENDING (LAD) ARTERY: Primary | ICD-10-CM

## 2024-04-11 PROBLEM — Z98.890 STATUS POST CORONARY ANGIOGRAM: Status: ACTIVE | Noted: 2024-04-11

## 2024-04-11 LAB
ABO/RH(D): NORMAL
ACT BLD: 205 SECONDS (ref 74–150)
ACT BLD: 205 SECONDS (ref 74–150)
ACT BLD: 340 SECONDS (ref 74–150)
ANION GAP SERPL CALCULATED.3IONS-SCNC: 10 MMOL/L (ref 7–15)
ANTIBODY SCREEN: NEGATIVE
ATRIAL RATE - MUSE: 45 BPM
ATRIAL RATE - MUSE: 48 BPM
BUN SERPL-MCNC: 15.4 MG/DL (ref 8–23)
CALCIUM SERPL-MCNC: 9 MG/DL (ref 8.8–10.2)
CHLORIDE SERPL-SCNC: 105 MMOL/L (ref 98–107)
CREAT SERPL-MCNC: 0.94 MG/DL (ref 0.51–0.95)
DEPRECATED HCO3 PLAS-SCNC: 28 MMOL/L (ref 22–29)
DIASTOLIC BLOOD PRESSURE - MUSE: NORMAL MMHG
DIASTOLIC BLOOD PRESSURE - MUSE: NORMAL MMHG
EGFRCR SERPLBLD CKD-EPI 2021: 64 ML/MIN/1.73M2
ERYTHROCYTE [DISTWIDTH] IN BLOOD BY AUTOMATED COUNT: 13.8 % (ref 10–15)
GLUCOSE SERPL-MCNC: 96 MG/DL (ref 70–99)
HCT VFR BLD AUTO: 38.4 % (ref 35–47)
HGB BLD-MCNC: 12.2 G/DL (ref 11.7–15.7)
INTERPRETATION ECG - MUSE: NORMAL
INTERPRETATION ECG - MUSE: NORMAL
MCH RBC QN AUTO: 29 PG (ref 26.5–33)
MCHC RBC AUTO-ENTMCNC: 31.8 G/DL (ref 31.5–36.5)
MCV RBC AUTO: 91 FL (ref 78–100)
P AXIS - MUSE: -3 DEGREES
P AXIS - MUSE: 48 DEGREES
PLATELET # BLD AUTO: 255 10E3/UL (ref 150–450)
POTASSIUM SERPL-SCNC: 4.3 MMOL/L (ref 3.4–5.3)
PR INTERVAL - MUSE: 172 MS
PR INTERVAL - MUSE: 174 MS
QRS DURATION - MUSE: 86 MS
QRS DURATION - MUSE: 88 MS
QT - MUSE: 528 MS
QT - MUSE: 560 MS
QTC - MUSE: 471 MS
QTC - MUSE: 484 MS
R AXIS - MUSE: -20 DEGREES
R AXIS - MUSE: 18 DEGREES
RBC # BLD AUTO: 4.2 10E6/UL (ref 3.8–5.2)
SODIUM SERPL-SCNC: 143 MMOL/L (ref 135–145)
SPECIMEN EXPIRATION DATE: NORMAL
SYSTOLIC BLOOD PRESSURE - MUSE: NORMAL MMHG
SYSTOLIC BLOOD PRESSURE - MUSE: NORMAL MMHG
T AXIS - MUSE: 90 DEGREES
T AXIS - MUSE: 98 DEGREES
VENTRICULAR RATE- MUSE: 45 BPM
VENTRICULAR RATE- MUSE: 48 BPM
WBC # BLD AUTO: 6.9 10E3/UL (ref 4–11)

## 2024-04-11 PROCEDURE — 92928 PRQ TCAT PLMT NTRAC ST 1 LES: CPT | Mod: LD | Performed by: INTERNAL MEDICINE

## 2024-04-11 PROCEDURE — 99152 MOD SED SAME PHYS/QHP 5/>YRS: CPT | Performed by: INTERNAL MEDICINE

## 2024-04-11 PROCEDURE — 92929 PR PRQ TRLUML CORONARY BM STENT W/ANGIO ADDL ART/BRNCH: CPT | Mod: LD | Performed by: INTERNAL MEDICINE

## 2024-04-11 PROCEDURE — 250N000011 HC RX IP 250 OP 636: Performed by: INTERNAL MEDICINE

## 2024-04-11 PROCEDURE — 93010 ELECTROCARDIOGRAM REPORT: CPT | Mod: HOP | Performed by: INTERNAL MEDICINE

## 2024-04-11 PROCEDURE — 250N000013 HC RX MED GY IP 250 OP 250 PS 637: Performed by: INTERNAL MEDICINE

## 2024-04-11 PROCEDURE — C1894 INTRO/SHEATH, NON-LASER: HCPCS | Performed by: INTERNAL MEDICINE

## 2024-04-11 PROCEDURE — 99153 MOD SED SAME PHYS/QHP EA: CPT | Performed by: INTERNAL MEDICINE

## 2024-04-11 PROCEDURE — 250N000013 HC RX MED GY IP 250 OP 250 PS 637: Performed by: NURSE PRACTITIONER

## 2024-04-11 PROCEDURE — 255N000002 HC RX 255 OP 636: Performed by: INTERNAL MEDICINE

## 2024-04-11 PROCEDURE — C9600 PERC DRUG-EL COR STENT SING: HCPCS | Performed by: INTERNAL MEDICINE

## 2024-04-11 PROCEDURE — 82374 ASSAY BLOOD CARBON DIOXIDE: CPT | Performed by: INTERNAL MEDICINE

## 2024-04-11 PROCEDURE — C1769 GUIDE WIRE: HCPCS | Performed by: INTERNAL MEDICINE

## 2024-04-11 PROCEDURE — 250N000009 HC RX 250: Performed by: INTERNAL MEDICINE

## 2024-04-11 PROCEDURE — C1887 CATHETER, GUIDING: HCPCS | Performed by: INTERNAL MEDICINE

## 2024-04-11 PROCEDURE — 250N000011 HC RX IP 250 OP 636: Performed by: NURSE PRACTITIONER

## 2024-04-11 PROCEDURE — C1725 CATH, TRANSLUMIN NON-LASER: HCPCS | Performed by: INTERNAL MEDICINE

## 2024-04-11 PROCEDURE — 85027 COMPLETE CBC AUTOMATED: CPT | Performed by: INTERNAL MEDICINE

## 2024-04-11 PROCEDURE — 272N000001 HC OR GENERAL SUPPLY STERILE: Performed by: INTERNAL MEDICINE

## 2024-04-11 PROCEDURE — C1874 STENT, COATED/COV W/DEL SYS: HCPCS | Performed by: INTERNAL MEDICINE

## 2024-04-11 PROCEDURE — C9601 PERC DRUG-EL COR STENT BRAN: HCPCS | Performed by: INTERNAL MEDICINE

## 2024-04-11 PROCEDURE — C1760 CLOSURE DEV, VASC: HCPCS | Performed by: INTERNAL MEDICINE

## 2024-04-11 PROCEDURE — 86900 BLOOD TYPING SEROLOGIC ABO: CPT | Performed by: INTERNAL MEDICINE

## 2024-04-11 PROCEDURE — 93005 ELECTROCARDIOGRAM TRACING: CPT

## 2024-04-11 PROCEDURE — 93458 L HRT ARTERY/VENTRICLE ANGIO: CPT | Mod: 26 | Performed by: INTERNAL MEDICINE

## 2024-04-11 PROCEDURE — 36415 COLL VENOUS BLD VENIPUNCTURE: CPT | Performed by: INTERNAL MEDICINE

## 2024-04-11 PROCEDURE — 85347 COAGULATION TIME ACTIVATED: CPT

## 2024-04-11 PROCEDURE — 258N000003 HC RX IP 258 OP 636: Performed by: INTERNAL MEDICINE

## 2024-04-11 PROCEDURE — 93458 L HRT ARTERY/VENTRICLE ANGIO: CPT | Mod: XU | Performed by: INTERNAL MEDICINE

## 2024-04-11 PROCEDURE — 999N000054 HC STATISTIC EKG NON-CHARGEABLE

## 2024-04-11 DEVICE — CLOSURE ANGIOSEAL 6FR 610130: Type: IMPLANTABLE DEVICE | Status: FUNCTIONAL

## 2024-04-11 DEVICE — STENT COR ONYX FRONTIER 15X3.50MM ONYXNG35015UX: Type: IMPLANTABLE DEVICE | Status: FUNCTIONAL

## 2024-04-11 DEVICE — STENT COR ONYX FRONTIER 15X2.25MM ONYXNG22515UX: Type: IMPLANTABLE DEVICE | Status: FUNCTIONAL

## 2024-04-11 RX ORDER — NALOXONE HYDROCHLORIDE 0.4 MG/ML
0.2 INJECTION, SOLUTION INTRAMUSCULAR; INTRAVENOUS; SUBCUTANEOUS
Status: DISCONTINUED | OUTPATIENT
Start: 2024-04-11 | End: 2024-04-11 | Stop reason: HOSPADM

## 2024-04-11 RX ORDER — HEPARIN SODIUM 1000 [USP'U]/ML
INJECTION, SOLUTION INTRAVENOUS; SUBCUTANEOUS
Status: DISCONTINUED | OUTPATIENT
Start: 2024-04-11 | End: 2024-04-11 | Stop reason: HOSPADM

## 2024-04-11 RX ORDER — ASPIRIN 81 MG/1
81 TABLET ORAL DAILY
Qty: 30 TABLET | Refills: 3 | Status: SHIPPED | OUTPATIENT
Start: 2024-04-12 | End: 2024-05-01

## 2024-04-11 RX ORDER — NALOXONE HYDROCHLORIDE 0.4 MG/ML
0.4 INJECTION, SOLUTION INTRAMUSCULAR; INTRAVENOUS; SUBCUTANEOUS
Status: DISCONTINUED | OUTPATIENT
Start: 2024-04-11 | End: 2024-04-11 | Stop reason: HOSPADM

## 2024-04-11 RX ORDER — METOPROLOL TARTRATE 1 MG/ML
5 INJECTION, SOLUTION INTRAVENOUS
Status: DISCONTINUED | OUTPATIENT
Start: 2024-04-11 | End: 2024-04-11 | Stop reason: HOSPADM

## 2024-04-11 RX ORDER — SODIUM CHLORIDE 9 MG/ML
INJECTION, SOLUTION INTRAVENOUS CONTINUOUS
Status: DISCONTINUED | OUTPATIENT
Start: 2024-04-11 | End: 2024-04-11 | Stop reason: HOSPADM

## 2024-04-11 RX ORDER — ASPIRIN 325 MG
325 TABLET ORAL ONCE
Status: COMPLETED | OUTPATIENT
Start: 2024-04-11 | End: 2024-04-11

## 2024-04-11 RX ORDER — ONDANSETRON 2 MG/ML
4 INJECTION INTRAMUSCULAR; INTRAVENOUS EVERY 6 HOURS PRN
Status: DISCONTINUED | OUTPATIENT
Start: 2024-04-11 | End: 2024-04-11 | Stop reason: HOSPADM

## 2024-04-11 RX ORDER — ASPIRIN 81 MG/1
81 TABLET ORAL DAILY
Status: DISCONTINUED | OUTPATIENT
Start: 2024-04-12 | End: 2024-04-11 | Stop reason: HOSPADM

## 2024-04-11 RX ORDER — HEPARIN SODIUM 200 [USP'U]/100ML
INJECTION, SOLUTION INTRAVENOUS
Status: DISCONTINUED | OUTPATIENT
Start: 2024-04-11 | End: 2024-04-11 | Stop reason: HOSPADM

## 2024-04-11 RX ORDER — FENTANYL CITRATE 50 UG/ML
25 INJECTION, SOLUTION INTRAMUSCULAR; INTRAVENOUS
Status: DISCONTINUED | OUTPATIENT
Start: 2024-04-11 | End: 2024-04-11 | Stop reason: HOSPADM

## 2024-04-11 RX ORDER — NITROGLYCERIN 5 MG/ML
VIAL (ML) INTRAVENOUS
Status: DISCONTINUED | OUTPATIENT
Start: 2024-04-11 | End: 2024-04-11 | Stop reason: HOSPADM

## 2024-04-11 RX ORDER — ASPIRIN 81 MG/1
243 TABLET, CHEWABLE ORAL ONCE
Status: COMPLETED | OUTPATIENT
Start: 2024-04-11 | End: 2024-04-11

## 2024-04-11 RX ORDER — CLOPIDOGREL BISULFATE 75 MG/1
75 TABLET ORAL DAILY
Qty: 90 TABLET | Refills: 3 | Status: SHIPPED | OUTPATIENT
Start: 2024-04-12

## 2024-04-11 RX ORDER — CLOPIDOGREL BISULFATE 75 MG/1
TABLET ORAL
Status: DISCONTINUED | OUTPATIENT
Start: 2024-04-11 | End: 2024-04-11 | Stop reason: HOSPADM

## 2024-04-11 RX ORDER — FENTANYL CITRATE 50 UG/ML
INJECTION, SOLUTION INTRAMUSCULAR; INTRAVENOUS
Status: DISCONTINUED | OUTPATIENT
Start: 2024-04-11 | End: 2024-04-11 | Stop reason: HOSPADM

## 2024-04-11 RX ORDER — OXYCODONE HYDROCHLORIDE 5 MG/1
5 TABLET ORAL EVERY 4 HOURS PRN
Status: DISCONTINUED | OUTPATIENT
Start: 2024-04-11 | End: 2024-04-11 | Stop reason: HOSPADM

## 2024-04-11 RX ORDER — HYDRALAZINE HYDROCHLORIDE 20 MG/ML
10 INJECTION INTRAMUSCULAR; INTRAVENOUS EVERY 4 HOURS PRN
Status: DISCONTINUED | OUTPATIENT
Start: 2024-04-11 | End: 2024-04-11 | Stop reason: HOSPADM

## 2024-04-11 RX ORDER — ACETAMINOPHEN 325 MG/1
650 TABLET ORAL EVERY 4 HOURS PRN
Status: DISCONTINUED | OUTPATIENT
Start: 2024-04-11 | End: 2024-04-11 | Stop reason: HOSPADM

## 2024-04-11 RX ORDER — ATROPINE SULFATE 0.1 MG/ML
0.5 INJECTION INTRAVENOUS
Status: DISCONTINUED | OUTPATIENT
Start: 2024-04-11 | End: 2024-04-11 | Stop reason: HOSPADM

## 2024-04-11 RX ORDER — CLOPIDOGREL BISULFATE 75 MG/1
75 TABLET ORAL DAILY
Status: DISCONTINUED | OUTPATIENT
Start: 2024-04-12 | End: 2024-04-11 | Stop reason: HOSPADM

## 2024-04-11 RX ORDER — IODIXANOL 320 MG/ML
INJECTION, SOLUTION INTRAVASCULAR
Status: DISCONTINUED | OUTPATIENT
Start: 2024-04-11 | End: 2024-04-11 | Stop reason: HOSPADM

## 2024-04-11 RX ORDER — OXYCODONE HYDROCHLORIDE 5 MG/1
10 TABLET ORAL EVERY 4 HOURS PRN
Status: DISCONTINUED | OUTPATIENT
Start: 2024-04-11 | End: 2024-04-11 | Stop reason: HOSPADM

## 2024-04-11 RX ORDER — LIDOCAINE 40 MG/G
CREAM TOPICAL
Status: DISCONTINUED | OUTPATIENT
Start: 2024-04-11 | End: 2024-04-11 | Stop reason: HOSPADM

## 2024-04-11 RX ORDER — FLUMAZENIL 0.1 MG/ML
0.2 INJECTION, SOLUTION INTRAVENOUS
Status: DISCONTINUED | OUTPATIENT
Start: 2024-04-11 | End: 2024-04-11 | Stop reason: HOSPADM

## 2024-04-11 RX ORDER — AMLODIPINE BESYLATE 5 MG/1
10 TABLET ORAL ONCE
Status: COMPLETED | OUTPATIENT
Start: 2024-04-11 | End: 2024-04-11

## 2024-04-11 RX ORDER — ONDANSETRON 4 MG/1
4 TABLET, ORALLY DISINTEGRATING ORAL EVERY 6 HOURS PRN
Status: DISCONTINUED | OUTPATIENT
Start: 2024-04-11 | End: 2024-04-11 | Stop reason: HOSPADM

## 2024-04-11 RX ORDER — NITROGLYCERIN 0.4 MG/1
0.4 TABLET SUBLINGUAL EVERY 5 MIN PRN
Status: DISCONTINUED | OUTPATIENT
Start: 2024-04-11 | End: 2024-04-11 | Stop reason: HOSPADM

## 2024-04-11 RX ORDER — DEXTROSE MONOHYDRATE 25 G/50ML
25-50 INJECTION, SOLUTION INTRAVENOUS
Status: DISCONTINUED | OUTPATIENT
Start: 2024-04-11 | End: 2024-04-11 | Stop reason: HOSPADM

## 2024-04-11 RX ORDER — DIAZEPAM 5 MG
5 TABLET ORAL ONCE
Status: COMPLETED | OUTPATIENT
Start: 2024-04-11 | End: 2024-04-11

## 2024-04-11 RX ORDER — NICOTINE POLACRILEX 4 MG
15-30 LOZENGE BUCCAL
Status: DISCONTINUED | OUTPATIENT
Start: 2024-04-11 | End: 2024-04-11 | Stop reason: HOSPADM

## 2024-04-11 RX ADMIN — DIAZEPAM 5 MG: 5 TABLET ORAL at 13:25

## 2024-04-11 RX ADMIN — ASPIRIN 81 MG CHEWABLE TABLET 162 MG: 81 TABLET CHEWABLE at 12:11

## 2024-04-11 RX ADMIN — AMLODIPINE BESYLATE 10 MG: 5 TABLET ORAL at 18:28

## 2024-04-11 RX ADMIN — ONDANSETRON 4 MG: 2 INJECTION INTRAMUSCULAR; INTRAVENOUS at 18:14

## 2024-04-11 RX ADMIN — HYDRALAZINE HYDROCHLORIDE 10 MG: 20 INJECTION INTRAMUSCULAR; INTRAVENOUS at 17:11

## 2024-04-11 RX ADMIN — SODIUM CHLORIDE: 9 INJECTION, SOLUTION INTRAVENOUS at 12:12

## 2024-04-11 ASSESSMENT — ACTIVITIES OF DAILY LIVING (ADL)
ADLS_ACUITY_SCORE: 36
ADLS_ACUITY_SCORE: 36
ADLS_ACUITY_SCORE: 35
ADLS_ACUITY_SCORE: 36
ADLS_ACUITY_SCORE: 36
ADLS_ACUITY_SCORE: 35
ADLS_ACUITY_SCORE: 36

## 2024-04-11 ASSESSMENT — EJECTION FRACTION: EF_VALUE: .22

## 2024-04-11 NOTE — Clinical Note
The first balloon was inserted into the left anterior descending and left anterior descending diagonal.Max pressure = 12 angeles. Total duration = 12 seconds.

## 2024-04-11 NOTE — INTERVAL H&P NOTE
"I have reviewed the surgical (or preoperative) H&P that is linked to this encounter, and examined the patient. There are no significant changes    Clinical Conditions Present on Arrival:  Clinically Significant Risk Factors Present on Admission                  # DMII: A1C = N/A within past 6 months  # Obesity: Estimated body mass index is 36.79 kg/m  as calculated from the following:    Height as of this encounter: 1.448 m (4' 9\").    Weight as of this encounter: 77.1 kg (170 lb).       "

## 2024-04-11 NOTE — Clinical Note
The first balloon was inserted into the left anterior descending and left anterior descending diagonal.Max pressure = 16 angeles. Total duration = 9 seconds.     Max pressure = 16 angeles. Total duration = 7 seconds.    Balloon reinflated a second time: Max pressure = 16 angeles. Total duration = 7 seconds.

## 2024-04-11 NOTE — DISCHARGE INSTRUCTIONS
Interventional Cardiology  Coronary Angiogram/Angioplasty/Stent/Atherectomy Discharge Instructions -   Femoral Approach  The instructions below are to help you understand how to take care of yourself. There is also information about when to call the doctor or emergency services  **Do not stop your aspirin or platelet inhibitor unless directed by your Cardiologist.  These medications help to prevent platelets in your blood from sticking together and forming a clot.  Examples of these medications are:  Ticagrelor (Brilinta), Clopidigrel (Plavix), Prasugrel (Effient)          For the first 72 hours after your procedure:  No driving for 24 hours  Do not lift more than 15 pounds.  If you usually lift 50 pounds or more daily, please contact your cardiologist  Avoid any hard work or tiring activities, this includes, yard work, jogging, biking, sexual activity  During the day get up and walk around every 2 hours  You may return to work after 72 hours if you are feeling well and your job does not involve heavy lifting          Groin Site / Wound Care / Bleeding    You may take off the dressing on your groin the day after your procedure  Keep the area dry and clean  It is ok to shower with regular soap.  Pat dry, do not rub  You do not need to use a bandage  No tub/pool or hot tub for 1 week  If your groin starts to bleed or begins to swell suddenly after leaving the hospital, lie flat and apply firm pressure above the site for 15 minutes.  If bleeding continues, call 9-1-1  Bruising around the groin area is normal.  It may take 2-3 weeks for this to go away.  It is normal for the bruised area to turn green and/or yellow as it is healing.  A small lump may also be present and may last 2-3 months.  Your leg may be sore or stiff for a few days.  You may take Tylenol or a pain medicine recommended by your doctor  If you have a fever over 100.4, that lasts more than one day - call your cardiologist.      Our Cardiac Rehab staff  may visit briefly with you while your in the hospital.  If they miss you, someone will contact you after you are home.  You are encouraged to enroll in an Outpatient Cardiac Rehab Program     No elective dental work for 6 weeks after having a stent.     No driving for 24 hours      Your Procedural Physician was: Dr. Simon, the phone number is: [phone].      [facility]:  [phone]  If you are calling after hours, please listen to the entire voicemail, a live  will answer at the end of the message

## 2024-04-11 NOTE — Clinical Note
Multiple balloon inflation. The first balloon was inserted into the left anterior descending and proximal left anterior descending.Max pressure = 10 angeles. Total duration = 15 seconds.     The second balloon was inserted into the Left Anterior Descending and left anterior decending diagonal. Max pressure = 10 angeles. Total duration = 15 seconds.   Max pressure = 10 angeles. Total duration = 15 seconds.

## 2024-04-11 NOTE — Clinical Note
The first balloon was inserted into the left anterior descending and proximal left anterior descending.Max pressure = 16 angeles. Total duration = 16 seconds.

## 2024-04-11 NOTE — Clinical Note
The first balloon was inserted into the left anterior descending and left anterior descending diagonal.Max pressure = 6 angeles. Total duration = 11 seconds.

## 2024-04-11 NOTE — PRE-PROCEDURE
GENERAL PRE-PROCEDURE:   Procedure:  Coronary angiogram, possible percutaneous coronary intervention  Date/Time:  4/11/2024 12:53 PM    Written consent obtained?: Yes    Risks and benefits: Risks, benefits and alternatives were discussed    Consent given by:  Patient (The patient is HMONG speaking and her consent was obtained with the assistance of a live ONG )  Patient states understanding of procedure being performed: Yes    Patient's understanding of procedure matches consent: Yes    Procedure consent matches procedure scheduled: Yes    Expected level of sedation:  Moderate  Appropriately NPO:  Yes  ASA Class:  3 (Dyspnea, valvular heart disease, LVOT obstruction, hypertrophic cardiomyopathy, HTN, dyslipidemia, DM type II, Class II obesity; BMI 36.79 kg/m )  Mallampati  :  Grade 1- soft palate, uvula, tonsillar pillars, and posterior pharyngeal wall visible  Lungs:  Lungs clear with good breath sounds bilaterally  Heart:  Systolic murmur (winsome)  History & Physical reviewed:  History and physical reviewed and updates made (see comment)  H&P Comments:  Clinically Significant Risk Factors Present on Admission    Cardiovascular : Dyspnea, valvular heart disease; moderate-severe MR, hypertrophic cardiomyopathy with LVOT obstruction, HTN, dyslipidemia, DM type II, Class II obesity; BMI 36.79 kg/m     Fluid & Electrolyte Disorders : Not present on admission    Gastroenterology : Not present on admission    Hematology/Oncology : Not present on admission    Nephrology : Not present on admission    Neurology : Not present on admission    Pulmonology : Not present on admission    Systemic : Not present on admission  Statement of review:  I have reviewed the lab findings, diagnostic data, medications, and the plan for sedation

## 2024-04-11 NOTE — Clinical Note
Stent deployed in the left anterior descending diagonal. Max pressure = 12 angeles. Total duration = 17 seconds.

## 2024-04-11 NOTE — Clinical Note
Stent deployed in the proximal left anterior descending. Max pressure = 12 angeles. Total duration = 17 seconds.

## 2024-04-12 PROBLEM — Z95.5 STATUS POST INSERTION OF DRUG-ELUTING STENT INTO LEFT ANTERIOR DESCENDING (LAD) ARTERY: Status: ACTIVE | Noted: 2024-04-12

## 2024-04-12 RX ORDER — MAVACAMTEN 2.5 MG/1
2.5 CAPSULE, GELATIN COATED ORAL DAILY
Qty: 30 CAPSULE | Refills: 5 | Status: SHIPPED | OUTPATIENT
Start: 2024-04-12

## 2024-04-12 NOTE — PROGRESS NOTES
Patient was kept comfortable during post-procedure stay.Blood pressure has been elevated. Treated with Hydralazine with short effect. Dr. Simon paged and ordered Norvasc 10mg PO. BP better after 30 minutes. Denies pain. Right femoral access site remains dry & free from signs of bleeding. Tolerated food and fluids. Ambulated without issues. Unable to set up appointments due to lateness of the day. In basket message sent to heart clinic to call patient's son You to arrange for follow-up appointments. Advised to monitor BP daily at home and report to MD if it usually runs high. Dr. Simon was able to speak with patient and son post procedure. Post-op instructions reviewed and packet given to patient & son. Able to ask questions. Verbalized no concerns. Belongings returned. Discharged in stable condition.

## 2024-04-17 ENCOUNTER — APPOINTMENT (OUTPATIENT)
Dept: INTERPRETER SERVICES | Facility: CLINIC | Age: 74
End: 2024-04-17
Payer: MEDICARE

## 2024-04-18 ENCOUNTER — TELEPHONE (OUTPATIENT)
Dept: CARDIOLOGY | Facility: CLINIC | Age: 74
End: 2024-04-18

## 2024-04-18 NOTE — TELEPHONE ENCOUNTER
Dr Bentley,  Please see call from pharmacy stating both the provider and patient need to be enrolled in the camzyos program for this medication to be released. Will include link for program. Unable to reach patient or family member with this information, will try again at future time.  -sea      [email or web address]               ============  [facility] Call Center    Phone Message    May a detailed message be left on voicemail: yes     Reason for Call: Other: rayray from [facility] is calling stating that before she can release the CAMZYOS 2.5 MG CAPS to the patient the provider and the patient need to be enrolled into the camzyos program, please advise,thank you     Action Taken: Other: cardiology    Travel Screening: Not Applicable  Thank you!  [facility]

## 2024-04-27 ENCOUNTER — HEALTH MAINTENANCE LETTER (OUTPATIENT)
Age: 74
End: 2024-04-27

## 2024-05-01 ENCOUNTER — OFFICE VISIT (OUTPATIENT)
Dept: CARDIOLOGY | Facility: CLINIC | Age: 74
End: 2024-05-01
Payer: MEDICARE

## 2024-05-01 VITALS
SYSTOLIC BLOOD PRESSURE: 132 MMHG | WEIGHT: 168 LBS | BODY MASS INDEX: 36.35 KG/M2 | RESPIRATION RATE: 14 BRPM | HEART RATE: 80 BPM | DIASTOLIC BLOOD PRESSURE: 80 MMHG

## 2024-05-01 DIAGNOSIS — E78.5 HYPERLIPIDEMIA LDL GOAL <100: ICD-10-CM

## 2024-05-01 DIAGNOSIS — I25.10 CORONARY ARTERY DISEASE INVOLVING NATIVE CORONARY ARTERY OF NATIVE HEART WITHOUT ANGINA PECTORIS: Primary | ICD-10-CM

## 2024-05-01 DIAGNOSIS — R80.9 TYPE 2 DIABETES MELLITUS WITH MICROALBUMINURIA, WITHOUT LONG-TERM CURRENT USE OF INSULIN (H): ICD-10-CM

## 2024-05-01 DIAGNOSIS — Q24.8 LEFT VENTRICULAR OUTFLOW TRACT OBSTRUCTION: ICD-10-CM

## 2024-05-01 DIAGNOSIS — E11.29 TYPE 2 DIABETES MELLITUS WITH MICROALBUMINURIA, WITHOUT LONG-TERM CURRENT USE OF INSULIN (H): ICD-10-CM

## 2024-05-01 PROCEDURE — 99214 OFFICE O/P EST MOD 30 MIN: CPT | Performed by: PHYSICIAN ASSISTANT

## 2024-05-01 PROCEDURE — T1013 SIGN LANG/ORAL INTERPRETER: HCPCS | Mod: GT | Performed by: PHYSICIAN ASSISTANT

## 2024-05-01 RX ORDER — LOVASTATIN 40 MG
40 TABLET ORAL EVERY EVENING
Qty: 90 TABLET | Refills: 3 | Status: SHIPPED | OUTPATIENT
Start: 2024-05-01

## 2024-05-01 RX ORDER — METOPROLOL SUCCINATE 25 MG/1
25 TABLET, EXTENDED RELEASE ORAL DAILY
Qty: 90 TABLET | Refills: 3 | Status: SHIPPED | OUTPATIENT
Start: 2024-05-01

## 2024-05-01 NOTE — PATIENT INSTRUCTIONS
It was great to see you today! Your care team includes myself and Dr. Bentley.    Medication changes:  Discontinue Aspirin  Continue Plavix (clopidogrel) 75 mg daily.     Follow up in 1 month with Dr Bentley as already scheduled.     If you have questions, concerns, or new concerning symptoms prior to your next appointment, please contact the Cardiology Nursing team at [phone].    For scheduling issues/changes, please call [phone].

## 2024-05-01 NOTE — LETTER
5/1/2024    Terry Sanchez MD  [address]  [place] [address]    RE: Amalia Harris       Dear Colleague,     I had the [facility] of [facility].          HEART CARE FOLLOW UP    Primary Care: Terry Sanchez MD  Primary Cardiologist: Dr Bentley      Assessment/Recommendations     Coronary Artery Disease, recent evaluation of dyspnea with stress MRI showed abnormal perfusion.  Given her symptoms she underwent an angiogram and was found to have significant LAD and first diagonal disease status post drug-eluting stent x 2.  Doing well without symptoms concerning for angina or heart failure. Secondary prevention is of the utmost importance  Patient completed 1 week of aspirin  Continue Plavix 75 mg daily x 12 1 months, after this is completed, she can resume aspirin  Continue lovastatin 40 mg daily   Continue diabetes care and BP management   Patient has nitroglycerin and undersatnds appropraite use and when to seek emergent care  Reviewed healthy diet and exercise; aim for 150 minutes of moderate intensity exercise weekly   Alcohol only in moderation   Hypertrophic Cardiomyopathy, patient evaluated for shortness of breath, echocardiogram reported significant LVOT obstruction (peak gradient 108 mmHg, mean gradient 96 mmHg). Cardiac MRI curiously did not identify significant LVOT obstruction which could be underestimated. MRI reported LV hyperdynamic, EF 80%. She is now on mavacamten 2.5 mg daily.   Reminded her to have children screened for cardiac rehab   Continue camzyos (mavacamten) 2. 5 mg daily, to be addressed at next visit with Dr. Bentley  Dyslipidemia, Most recent  and LDL 60. Patient is maintained on therapy.  Continue lovastatin 40 mg daily   We discussed the role diet, exercise and weight management can play in managing dyslipidemia.       Return to care in 1 month with Dr Bentley.      History of Present Illness/Subjective    Amalia Harris is a 74 year old female with  past medical history significant for:  Coronary artery disease  Status post PCI to the LAD/LAD D1 with cross bifurcation stent technique and drug-eluting stent x 2.  Hypertrophic cardiomyopathy with LVOT outflow tract obstruction  Valvular heart disease  Moderate to severe mitral regurgitation  Mitral valve stenosis  Hypertension  Dyslipidemia  Type 2 diabetes    The patient was last seen in in clinic on April 8 of this year.  At that time she was following up for evaluation of shortness of breath.  Cardiac MRI with stress and flow on March 26, 2024 showed medium size of inducible myocardial ischemia in the LAD distribution concerning for significant LAD stenosis.  At that visit he complained of intermittent chest tightness lasting for several minutes and shortness of breath with exertion.  An angiogram was recommended.  This showed moderately to severe 70% eccentric proximal LAD plaque that appeared ruptured at the takeoff of the first diagonal.  Diagonal had a 99% stenosis of the ostium.    Today the patient tells me she feel smuch better than prior to her angiogram/stents. The tightness in her chest and shortness of breath she had has resolved. She's able to play with her grandchildren more easily and doesn't get as tired. Doing her housework is easier. She is very happy with how she feels. She previously felt palpitations and this has resolved.     No dizziness, lightheadedness, pre-syncope or syncope. No palpitations or racing heart. Sleeping well if she doesn't nap, sleeps without orthopnea or PND. No leg swelling or cramping. No claudication symptoms. No blood in stool or urine. No fevers, chills or cough.  She does have neuropathy in her feet at night.          Data Review Today:     MRI 3/26/2024:  1.  Pharmacologic regadenoson stress cardiac MRI is abnormal. There is a medium-sized area of inducible myocardial ischemia in the anteroseptal wall of the left ventricle suggestive of obstructive coronary artery  disease in the territory of the left anterior descending artery. Alternatively, this could be due to microvascular ischemia from hypertrophied myocardium (as discussed below). 2.  No evidence of prior myocardial infarction, focal nonvascular myocardial fibrosis or infiltrative disease.  3.  Severe asymmetric hypertrophy of the basal anteroseptal wall of the left ventricle with a maximum wall thickness of 17 mm. There is evidence of left ventricular outflow tract acceleration but no obstruction or systolic anterior motion of the mitral apparatus with a peak resting left ventricular outflow gradient of 8 mmHg. Left ventricular cavity size is small and systolic function is hyperdynamic with a quantified left ventricular ejection fraction is 80%.  4.  Right ventricular chamber size and systolic function are normal.  The quantified right ventricular ejection fraction is 68%. 5.  Moderate left atrial enlargement.  6.  Severe mitral annular calcification and mild mitral leaflet thickening. Visually there may be mild mitral stenosis and valve area by planimetry is 1.8 cm^2. There is mild central mitral regurgitation likely due to fibrodegenerative changes of the mitral apparatus.  7.  Mild enlargement of the main pulmonary artery which can be seen in pulmonary hypertension.    Coronary Angiogram 4/11/2024:    Prox LAD lesion is 70% stenosed.    1st Diag lesion is 95% stenosed.    Prox RCA lesion is 20% stenosed.    Dist RCA lesion is 30% stenosed.     1.  Moderately severe 70% eccentric proximal LAD plaque, appears ruptured, at takeoff of first diagonal branch.  Diagonal branch has 90% ostial stenosis.  Immediately distal to this segment the LAD has a segment of systolic bridging.  The remainder of the LAD appears normal.  2.  Left circumflex appears normal.  3.  RCA has mild luminal irregularity.  No significant stenoses.  4.  Successful PCI LAD/D1 with crush bifurcation stent technique: 2.5 x 15 Toone drug-eluting stent in  diagonal and 3.5 x 15 Audie drug-eluting stent in LAD.  Post PCI 0% residual both limbs with KENNY-3 flow.  5.  /24; Ao 164/78 on pullback; mean gradient across the LV outflow tract and aortic valve 19 mmHg (patient sedated and sleeping during measurement)         I have reviewed and updated the patient's past medical history, allergy list and medication list.          Physical Examination   Vitals: There were no vitals taken for this visit.    BMI= There is no height or weight on file to calculate BMI.    Wt Readings from Last 3 Encounters:   04/11/24 77.1 kg (170 lb)   04/08/24 75.8 kg (167 lb)   02/23/24 76.2 kg (168 lb)       General :   Alert and oriented, in no acute distress.    HEENT:  Normocephalic and atraumatic. .    Neck: No JVP, carotid bruit or obvious thyromegaly.   Lungs:   Respirations unlabored. Clear bilaterally with no rales, rhonchi, or wheezes.     Cardiovascular:   Rhythm is regular. S1 and S2 are normal. No significant murmur is present. Lower extremities demonstrate no significant edema.        Skin: Skin is warm, dry, and otherwise intact.   Neurologic: Gait not assessed. Mood and affect appropriate.           Medical History  Surgical History Family History Social History   Past Medical History:   Diagnosis Date    Guaiac positive stools 06/06/2016    Hyperlipidemia LDL goal <130 03/23/2011    Hypertension     Hypoxia     Major depression 03/23/2011    Nonsenile cataract     Osteoarthritis 03/23/2011    Severe obesity (BMI 35.0-39.9) with comorbidity (H) 08/27/2014    HTN, diabetes    Urinary tract stones     Past Surgical History:   Procedure Laterality Date    CATARACT IOL, RT/LT      CHOLECYSTECTOMY      COLONOSCOPY WITH CO2 INSUFFLATION N/A 02/10/2021    Procedure: COLONOSCOPY, WITH CO2 INSUFFLATION;  Surgeon: Loco Andersen MD;  Location:  OR    CV CORONARY ANGIOGRAM N/A 4/11/2024    Procedure: Coronary Angiogram;  Surgeon: Eliezer Simon MD;  Location: [facility] CATH LAB CV     CV LEFT HEART CATH N/A 4/11/2024    Procedure: Left Heart Catheterization;  Surgeon: Eliezer Simon MD;  Location: [facility] LAB CV    CV PCI STENT DRUG ELUTING N/A 4/11/2024    Procedure: Percutaneous Coronary Intervention Stent;  Surgeon: Eliezer Simon MD;  Location: [facility] LAB CV    GALLBLADDER SURGERY  1986    PHACOEMULSIFICATION WITH STANDARD INTRAOCULAR LENS IMPLANT Right 11/11/2019    Procedure: RIGHT PHACOEMULSIFICATION, CATARACT, WITH STANDARD IOL INSERTION;  Surgeon: Robel Clancy MD;  Location: [facility] OR    PHACOEMULSIFICATION WITH STANDARD INTRAOCULAR LENS IMPLANT Left 11/25/2019    Procedure: LEFT PHACOEMULSIFICATION, CATARACT, WITH STANDARD IOL INSERTION;  Surgeon: Robel Clancy MD;  Location: [facility] OR    VA CYSTO/URETERO W/LITHOTRIPSY &INDWELL STENT INSRT Right 09/29/2017    Procedure: CYSTOSCOPY, RIGHT URETEROSCOPY LASER LITHOTRIPSY STENT INSERTION;  Surgeon: Dominguez Stoner MD;  Location: [facility];  Service: Urology    Family History   Problem Relation Age of Onset    Respiratory Father     Hyperlipidemia Father     Hyperlipidemia Sister     Cancer Brother         throat or esophageal (alcohol)    Hypertension No family hx of     Cerebrovascular Disease No family hx of     Coronary Artery Disease No family hx of     Diabetes No family hx of     Anesthesia Reaction No family hx of     Bleeding Disorder No family hx of     Thrombophilia No family hx of     Asthma Father     Social History     Socioeconomic History    Marital status: Single     Spouse name: Not on file    Number of children: Not on file    Years of education: Not on file    Highest education level: Not on file   Occupational History    Not on file   Tobacco Use    Smoking status: Never    Smokeless tobacco: Never   Substance and Sexual Activity    Alcohol use: No     Alcohol/week: 0.0 standard drinks of alcohol    Drug use: No    Sexual activity: Not Currently   Other Topics Concern     Parent/sibling w/ CABG, MI or angioplasty before 65F 55M? No   Social History Narrative    Not on file     Social Determinants of Health     Financial Resource Strain: Unknown (12/18/2023)    Financial Resource Strain     Within the past 12 months, have you or your family members you live with been unable to get utilities (heat, electricity) when it was really needed?: Patient refused   Food Insecurity: Unknown (12/18/2023)    Food Insecurity     Within the past 12 months, did you worry that your food would run out before you got money to buy more?: Patient refused     Within the past 12 months, did the food you bought just not last and you didn t have money to get more?: Patient refused   Transportation Needs: Unknown (12/18/2023)    Transportation Needs     Within the past 12 months, has lack of transportation kept you from medical appointments, getting your medicines, non-medical meetings or appointments, work, or from getting things that you need?: Patient refused   Physical Activity: Not on file   Stress: Not on file   Social Connections: Not on file   Interpersonal Safety: Low Risk  (12/18/2023)    Interpersonal Safety     Do you feel physically and emotionally safe where you currently live?: Yes     Within the past 12 months, have you been hit, slapped, kicked or otherwise physically hurt by someone?: No     Within the past 12 months, have you been humiliated or emotionally abused in other ways by your partner or ex-partner?: No   Housing Stability: Unknown (12/18/2023)    Housing Stability     Do you have housing? : Patient refused     Are you worried about losing your housing?: Patient refused          Medications  Allergies   Scheduled Meds:  Current Outpatient Medications   Medication Sig Dispense Refill    aspirin 81 MG EC tablet Take 1 tablet (81 mg) by mouth daily Start tomorrow. 30 tablet 3    blood glucose (FREESTYLE TEST STRIPS) test strip 1 strip by In Vitro route daily 100 strip 3    blood glucose  (NO BRAND SPECIFIED) lancets standard Use to test blood sugar 1 times daily or as directed. 100 each 3    blood glucose monitoring (ACCU-CHEK FASTCLIX) lancets       blood glucose monitoring (NO BRAND SPECIFIED) meter device kit Use to test blood sugar 1 times daily or as directed. 1 kit 0    Blood Glucose Monitoring Suppl (FREESTYLE LITE) w/Device KIT USE TO TEST BLOOD SUGAR ONE TIME DAILY OR AS DIRECTED.      CAMZYOS 2.5 MG CAPS TAKE 2.5 MG BY MOUTH DAILY 30 capsule 5    clopidogrel (PLAVIX) 75 MG tablet Take 1 tablet (75 mg) by mouth daily 90 tablet 3    lancets 28G MISC 1 each by Other route      lovastatin (MEVACOR) 40 MG tablet Take 1 tablet (40 mg) by mouth every evening for cholesterol. 90 tablet 1    metFORMIN (GLUCOPHAGE XR) 500 MG 24 hr tablet Take 2 tablets (1,000 mg) by mouth 2 times daily (with meals) for diabetes. 360 tablet 1    metoprolol succinate ER (TOPROL XL) 25 MG 24 hr tablet Take 1 tablet (25 mg) by mouth daily for heart failure (and blood pressure). 90 tablet 1    potassium citrate (UROCIT-K) 10 MEQ (1080 MG) CR tablet Take 3 tablets (30 mEq) by mouth 2 times daily for kidney stone prevention. 540 tablet 3    Allergies   Allergen Reactions    No Clinical Screening - See Comments Rash     Tele patches         Lab Results    Chemistry/lipid CBC Cardiac Enzymes/BNP/TSH/INR   Lab Results   Component Value Date    CHOL 160 01/30/2023    HDL 49 (L) 01/30/2023    TRIG 256 (H) 01/30/2023    BUN 15.4 04/11/2024     04/11/2024    CO2 28 04/11/2024    Lab Results   Component Value Date    WBC 6.9 04/11/2024    HGB 12.2 04/11/2024    HCT 38.4 04/11/2024    MCV 91 04/11/2024     04/11/2024    @RESUFAST(BMP,CBC,BNP,TSH,  INR)@      30 minutes spent reviewing prior records (including documentation, laboratory studies, cardiac testing/imaging), history and physical exam, planning, and subsequent documentation.       This note has been dictated using voice recognition software. Any grammatical,  typographical, or context distortions are unintentional and inherent to the software.    Cristy Sainz PA-C, RD  General Cardiology       Thank you for allowing me to participate in the care of your patient.      Sincerely,     Cristy Sainz PA-C     [facility]  cc:   Eliezer Simon MD  [address] [address]

## 2024-05-01 NOTE — Clinical Note
Hi, Dr. Bentley! Amalia Harris feels SO much better since her stent, she was enthusiastic about it.   I've never used mavacamten before, I was reading about the initial loading phase and re-checking the gradient. Sounds interesting. Wondering if you can share more about when/how you use it?

## 2024-05-01 NOTE — PROGRESS NOTES
HEART CARE FOLLOW UP    Primary Care: Terry Sanchez MD  Primary Cardiologist: Dr Bentley      Assessment/Recommendations     Coronary Artery Disease, recent evaluation of dyspnea with stress MRI showed abnormal perfusion.  Given her symptoms she underwent an angiogram and was found to have significant LAD and first diagonal disease status post drug-eluting stent x 2.  Doing well without symptoms concerning for angina or heart failure. Secondary prevention is of the utmost importance  Patient completed 1 week of aspirin  Continue Plavix 75 mg daily x 12 1 months, after this is completed, she can resume aspirin  Continue lovastatin 40 mg daily   Continue diabetes care and BP management   Patient has nitroglycerin and undersatnds appropraite use and when to seek emergent care  Reviewed healthy diet and exercise; aim for 150 minutes of moderate intensity exercise weekly   Alcohol only in moderation   Hypertrophic Cardiomyopathy, patient evaluated for shortness of breath, echocardiogram reported significant LVOT obstruction (peak gradient 108 mmHg, mean gradient 96 mmHg). Cardiac MRI curiously did not identify significant LVOT obstruction which could be underestimated. MRI reported LV hyperdynamic, EF 80%. She is now on mavacamten 2.5 mg daily.   Reminded her to have children screened for cardiac rehab   Continue camzyos (mavacamten) 2. 5 mg daily, to be addressed at next visit with Dr. Bentley  Dyslipidemia, Most recent  and LDL 60. Patient is maintained on therapy.  Continue lovastatin 40 mg daily   We discussed the role diet, exercise and weight management can play in managing dyslipidemia.       Return to care in 1 month with Dr Bentley.      History of Present Illness/Subjective    Amalia Harris is a 74 year old female with past medical history significant for:  Coronary artery disease  Status post PCI to the LAD/LAD D1 with cross bifurcation stent technique and drug-eluting stent x 2.  Hypertrophic cardiomyopathy  with LVOT outflow tract obstruction  Valvular heart disease  Moderate to severe mitral regurgitation  Mitral valve stenosis  Hypertension  Dyslipidemia  Type 2 diabetes    The patient was last seen in in clinic on April 8 of this year.  At that time she was following up for evaluation of shortness of breath.  Cardiac MRI with stress and flow on March 26, 2024 showed medium size of inducible myocardial ischemia in the LAD distribution concerning for significant LAD stenosis.  At that visit he complained of intermittent chest tightness lasting for several minutes and shortness of breath with exertion.  An angiogram was recommended.  This showed moderately to severe 70% eccentric proximal LAD plaque that appeared ruptured at the takeoff of the first diagonal.  Diagonal had a 99% stenosis of the ostium.    Today the patient tells me she feel smuch better than prior to her angiogram/stents. The tightness in her chest and shortness of breath she had has resolved. She's able to play with her grandchildren more easily and doesn't get as tired. Doing her housework is easier. She is very happy with how she feels. She previously felt palpitations and this has resolved.     No dizziness, lightheadedness, pre-syncope or syncope. No palpitations or racing heart. Sleeping well if she doesn't nap, sleeps without orthopnea or PND. No leg swelling or cramping. No claudication symptoms. No blood in stool or urine. No fevers, chills or cough.  She does have neuropathy in her feet at night.          Data Review Today:     MRI 3/26/2024:  1.  Pharmacologic regadenoson stress cardiac MRI is abnormal. There is a medium-sized area of inducible myocardial ischemia in the anteroseptal wall of the left ventricle suggestive of obstructive coronary artery disease in the territory of the left anterior descending artery. Alternatively, this could be due to microvascular ischemia from hypertrophied myocardium (as discussed below). 2.  No evidence  of prior myocardial infarction, focal nonvascular myocardial fibrosis or infiltrative disease.  3.  Severe asymmetric hypertrophy of the basal anteroseptal wall of the left ventricle with a maximum wall thickness of 17 mm. There is evidence of left ventricular outflow tract acceleration but no obstruction or systolic anterior motion of the mitral apparatus with a peak resting left ventricular outflow gradient of 8 mmHg. Left ventricular cavity size is small and systolic function is hyperdynamic with a quantified left ventricular ejection fraction is 80%.  4.  Right ventricular chamber size and systolic function are normal.  The quantified right ventricular ejection fraction is 68%. 5.  Moderate left atrial enlargement.  6.  Severe mitral annular calcification and mild mitral leaflet thickening. Visually there may be mild mitral stenosis and valve area by planimetry is 1.8 cm^2. There is mild central mitral regurgitation likely due to fibrodegenerative changes of the mitral apparatus.  7.  Mild enlargement of the main pulmonary artery which can be seen in pulmonary hypertension.    Coronary Angiogram 4/11/2024:    Prox LAD lesion is 70% stenosed.    1st Diag lesion is 95% stenosed.    Prox RCA lesion is 20% stenosed.    Dist RCA lesion is 30% stenosed.     1.  Moderately severe 70% eccentric proximal LAD plaque, appears ruptured, at takeoff of first diagonal branch.  Diagonal branch has 90% ostial stenosis.  Immediately distal to this segment the LAD has a segment of systolic bridging.  The remainder of the LAD appears normal.  2.  Left circumflex appears normal.  3.  RCA has mild luminal irregularity.  No significant stenoses.  4.  Successful PCI LAD/D1 with crush bifurcation stent technique: 2.5 x 15 [place] drug-eluting stent in diagonal and 3.5 x 15 [place] drug-eluting stent in LAD.  Post PCI 0% residual both limbs with KENNY-3 flow.  5.  /24; Ao 164/78 on pullback; mean gradient across the LV outflow tract and  aortic valve 19 mmHg (patient sedated and sleeping during measurement)         I have reviewed and updated the patient's past medical history, allergy list and medication list.          Physical Examination   Vitals: There were no vitals taken for this visit.    BMI= There is no height or weight on file to calculate BMI.    Wt Readings from Last 3 Encounters:   04/11/24 77.1 kg (170 lb)   04/08/24 75.8 kg (167 lb)   02/23/24 76.2 kg (168 lb)       General :   Alert and oriented, in no acute distress.    HEENT:  Normocephalic and atraumatic. .    Neck: No JVP, carotid bruit or obvious thyromegaly.   Lungs:   Respirations unlabored. Clear bilaterally with no rales, rhonchi, or wheezes.     Cardiovascular:   Rhythm is regular. S1 and S2 are normal. No significant murmur is present. Lower extremities demonstrate no significant edema.        Skin: Skin is warm, dry, and otherwise intact.   Neurologic: Gait not assessed. Mood and affect appropriate.           Medical History  Surgical History Family History Social History   Past Medical History:   Diagnosis Date    Guaiac positive stools 06/06/2016    Hyperlipidemia LDL goal <130 03/23/2011    Hypertension     Hypoxia     Major depression 03/23/2011    Nonsenile cataract     Osteoarthritis 03/23/2011    Severe obesity (BMI 35.0-39.9) with comorbidity (H) 08/27/2014    HTN, diabetes    Urinary tract stones     Past Surgical History:   Procedure Laterality Date    CATARACT IOL, RT/LT      CHOLECYSTECTOMY      COLONOSCOPY WITH CO2 INSUFFLATION N/A 02/10/2021    Procedure: COLONOSCOPY, WITH CO2 INSUFFLATION;  Surgeon: Loco Andersen MD;  Location: [facility] OR    CV CORONARY ANGIOGRAM N/A 4/11/2024    Procedure: Coronary Angiogram;  Surgeon: Eliezer Simon MD;  Location: [facility] CV    CV LEFT HEART CATH N/A 4/11/2024    Procedure: Left Heart Catheterization;  Surgeon: Eliezer Simon MD;  Location: [facility] CV    CV PCI STENT DRUG ELUTING N/A 4/11/2024     Procedure: Percutaneous Coronary Intervention Stent;  Surgeon: Eliezer Simon MD;  Location: [facility] CATH LAB CV    GALLBLADDER SURGERY  1986    PHACOEMULSIFICATION WITH STANDARD INTRAOCULAR LENS IMPLANT Right 11/11/2019    Procedure: RIGHT PHACOEMULSIFICATION, CATARACT, WITH STANDARD IOL INSERTION;  Surgeon: Robel Clancy MD;  Location: [facility] OR    PHACOEMULSIFICATION WITH STANDARD INTRAOCULAR LENS IMPLANT Left 11/25/2019    Procedure: LEFT PHACOEMULSIFICATION, CATARACT, WITH STANDARD IOL INSERTION;  Surgeon: Robel Clancy MD;  Location: [facility] OR    DC CYSTO/URETERO W/LITHOTRIPSY &INDWELL STENT INSRT Right 09/29/2017    Procedure: CYSTOSCOPY, RIGHT URETEROSCOPY LASER LITHOTRIPSY STENT INSERTION;  Surgeon: Dominguez Stoner MD;  Location: [facility];  Service: Urology    Family History   Problem Relation Age of Onset    Respiratory Father     Hyperlipidemia Father     Hyperlipidemia Sister     Cancer Brother         throat or esophageal (alcohol)    Hypertension No family hx of     Cerebrovascular Disease No family hx of     Coronary Artery Disease No family hx of     Diabetes No family hx of     Anesthesia Reaction No family hx of     Bleeding Disorder No family hx of     Thrombophilia No family hx of     Asthma Father     Social History     Socioeconomic History    Marital status: Single     Spouse name: Not on file    Number of children: Not on file    Years of education: Not on file    Highest education level: Not on file   Occupational History    Not on file   Tobacco Use    Smoking status: Never    Smokeless tobacco: Never   Substance and Sexual Activity    Alcohol use: No     Alcohol/week: 0.0 standard drinks of alcohol    Drug use: No    Sexual activity: Not Currently   Other Topics Concern    Parent/sibling w/ CABG, MI or angioplasty before 65F 55M? No   Social History Narrative    Not on file     Social Determinants of Health     Financial Resource Strain: Unknown (12/18/2023)     Financial Resource Strain     Within the past 12 months, have you or your family members you live with been unable to get utilities (heat, electricity) when it was really needed?: Patient refused   Food Insecurity: Unknown (12/18/2023)    Food Insecurity     Within the past 12 months, did you worry that your food would run out before you got money to buy more?: Patient refused     Within the past 12 months, did the food you bought just not last and you didn t have money to get more?: Patient refused   Transportation Needs: Unknown (12/18/2023)    Transportation Needs     Within the past 12 months, has lack of transportation kept you from medical appointments, getting your medicines, non-medical meetings or appointments, work, or from getting things that you need?: Patient refused   Physical Activity: Not on file   Stress: Not on file   Social Connections: Not on file   Interpersonal Safety: Low Risk  (12/18/2023)    Interpersonal Safety     Do you feel physically and emotionally safe where you currently live?: Yes     Within the past 12 months, have you been hit, slapped, kicked or otherwise physically hurt by someone?: No     Within the past 12 months, have you been humiliated or emotionally abused in other ways by your partner or ex-partner?: No   Housing Stability: Unknown (12/18/2023)    Housing Stability     Do you have housing? : Patient refused     Are you worried about losing your housing?: Patient refused          Medications  Allergies   Scheduled Meds:  Current Outpatient Medications   Medication Sig Dispense Refill    aspirin 81 MG EC tablet Take 1 tablet (81 mg) by mouth daily Start tomorrow. 30 tablet 3    blood glucose (FREESTYLE TEST STRIPS) test strip 1 strip by In Vitro route daily 100 strip 3    blood glucose (NO BRAND SPECIFIED) lancets standard Use to test blood sugar 1 times daily or as directed. 100 each 3    blood glucose monitoring (ACCU-CHEK FASTCLIX) lancets       blood glucose monitoring  (NO BRAND SPECIFIED) meter device kit Use to test blood sugar 1 times daily or as directed. 1 kit 0    Blood Glucose Monitoring Suppl (FREESTYLE LITE) w/Device KIT USE TO TEST BLOOD SUGAR ONE TIME DAILY OR AS DIRECTED.      CAMZYOS 2.5 MG CAPS TAKE 2.5 MG BY MOUTH DAILY 30 capsule 5    clopidogrel (PLAVIX) 75 MG tablet Take 1 tablet (75 mg) by mouth daily 90 tablet 3    lancets 28G MISC 1 each by Other route      lovastatin (MEVACOR) 40 MG tablet Take 1 tablet (40 mg) by mouth every evening for cholesterol. 90 tablet 1    metFORMIN (GLUCOPHAGE XR) 500 MG 24 hr tablet Take 2 tablets (1,000 mg) by mouth 2 times daily (with meals) for diabetes. 360 tablet 1    metoprolol succinate ER (TOPROL XL) 25 MG 24 hr tablet Take 1 tablet (25 mg) by mouth daily for heart failure (and blood pressure). 90 tablet 1    potassium citrate (UROCIT-K) 10 MEQ (1080 MG) CR tablet Take 3 tablets (30 mEq) by mouth 2 times daily for kidney stone prevention. 540 tablet 3    Allergies   Allergen Reactions    No Clinical Screening - See Comments Rash     Tele patches         Lab Results    Chemistry/lipid CBC Cardiac Enzymes/BNP/TSH/INR   Lab Results   Component Value Date    CHOL 160 01/30/2023    HDL 49 (L) 01/30/2023    TRIG 256 (H) 01/30/2023    BUN 15.4 04/11/2024     04/11/2024    CO2 28 04/11/2024    Lab Results   Component Value Date    WBC 6.9 04/11/2024    HGB 12.2 04/11/2024    HCT 38.4 04/11/2024    MCV 91 04/11/2024     04/11/2024    @RESUFAST(BMP,CBC,BNP,TSH,  INR)@      30 minutes spent reviewing prior records (including documentation, laboratory studies, cardiac testing/imaging), history and physical exam, planning, and subsequent documentation.       This note has been dictated using voice recognition software. Any grammatical, typographical, or context distortions are unintentional and inherent to the software.    Cristy Sainz PA-C, RD  General Cardiology

## 2024-05-02 ENCOUNTER — TELEPHONE (OUTPATIENT)
Dept: CARDIOLOGY | Facility: CLINIC | Age: 74
End: 2024-05-02

## 2024-05-02 ENCOUNTER — APPOINTMENT (OUTPATIENT)
Dept: INTERPRETER SERVICES | Facility: CLINIC | Age: 74
End: 2024-05-02
Payer: MEDICARE

## 2024-05-02 NOTE — TELEPHONE ENCOUNTER
Writer received request from pharmacy for patient and provider to enroll in camzyos program before able to dispensing medication. Message sent to Dr Bentley in previous encounter, and PC to patient's son Lae to discuss. Unable to rech, left VM for CB to discuss, direct contact provided.  -sea

## 2024-05-21 NOTE — TELEPHONE ENCOUNTER
Patient will follow-up with Dr. Bentley on 5/28. Camjenniferyos issue can be addressed at that time. -ejb

## 2024-06-14 ENCOUNTER — TELEPHONE (OUTPATIENT)
Dept: FAMILY MEDICINE | Facility: CLINIC | Age: 74
End: 2024-06-14
Payer: MEDICARE

## 2024-06-14 NOTE — LETTER
[facility]  [address] [facility] [address]  [phone]  Dept: [phone]    June 14, 2024    Amalia Harris  [address]  [place] [facility] [address]    Dear Amalia,    At [facility] we care about your health and are committed to providing quality patient care.     Here is a list of Health Maintenance topics that are due now or due soon:  Health Maintenance Due   Topic Date Due    HF ACTION PLAN  Never done    ZOSTER IMMUNIZATION (1 of 2) Never done    RSV VACCINE (Pregnancy & 60+) (1 - 1-dose 60+ series) Never done    MAMMO SCREENING  01/15/2023    MEDICARE ANNUAL WELLNESS VISIT  01/30/2024    LIPID  01/30/2024    DIABETIC FOOT EXAM  01/30/2024    COVID-19 Vaccine (5 - 2023-24 season) 04/18/2024    A1C  04/30/2024    PHQ-9  06/18/2024        We are recommending that you:  Schedule a WELLNESS (Preventative/Physical) APPOINTMENT with your primary care provider. If you go elsewhere for your wellness appointments then please disregard this reminder    ,   Schedule a MAMMOGRAM which is due.    1 in 8 women will develop invasive breast cancer during her lifetime and it is the most common non-skin cancer in American women.  EARLY detection, new treatments, and a better understanding of the disease have increased survival rates - the 5 year survival rate in the 1960s was 63% and today it is close to 90%.    If you are under/uninsured, we recommend you contact the [facility]. They offer mammograms at no charge or on a sliding fee charge. You can schedule with them at [phone]. Please have them send us the results.      Please disregard this reminder if you have had this exam elsewhere within the last year.  It would be helpful for us to have a copy of your mammogram report in your file so that we can best coordinate your care - please contact us with when your test was done so we can update your record.    ,    Schedule a Diabetes Follow-Up Office  Appointment: You are due to be seen with your primary care provider for a diabetes follow up office appointment. Please schedule this as soon as you are able.    ,   Complete the attached Questionnaire:  You are due to complete the attached PHQ questionnaire. Please complete as soon as you are able.    , and   Schedule a Nurse-Only appointment to update your immunizations: Your records indicate that you are not up to date with your immunizations, please schedule a nurse-only appointment to get these updated or update them at your next office visit. If this is incorrect, please disregard.    To schedule an appointment or discuss this further, you may contact us by phone at the [facility] at [phone] or online through the patient [email or web address] @ [email or web address]    Thank you for trusting [facility] and we appreciate the opportunity to serve you.  We look forward to supporting your healthcare needs in the future.    Your partners in health,      Quality Committee at [facility]

## 2024-06-14 NOTE — TELEPHONE ENCOUNTER
Patient Quality Outreach    Patient is due for the following:   Diabetes -  A1C, LDL (Fasting), Statin, and Foot Exam  Breast Cancer Screening - Mammogram  Depression  -  PHQ-9 needed  Physical Annual Wellness Visit      Topic Date Due    Zoster (Shingles) Vaccine (1 of 2) Never done    COVID-19 Vaccine (5 - 2023-24 season) 04/18/2024       Next Steps:   Schedule a Annual Wellness Visit    Type of outreach:    Sent letter.      Questions for provider review:    None           Karin Saenz MA

## 2024-06-15 DIAGNOSIS — R80.9 TYPE 2 DIABETES MELLITUS WITH MICROALBUMINURIA, WITHOUT LONG-TERM CURRENT USE OF INSULIN (H): ICD-10-CM

## 2024-06-15 DIAGNOSIS — E11.29 TYPE 2 DIABETES MELLITUS WITH MICROALBUMINURIA, WITHOUT LONG-TERM CURRENT USE OF INSULIN (H): ICD-10-CM

## 2024-06-17 RX ORDER — METFORMIN HCL 500 MG
1000 TABLET, EXTENDED RELEASE 24 HR ORAL 2 TIMES DAILY WITH MEALS
Qty: 360 TABLET | Refills: 0 | Status: SHIPPED | OUTPATIENT
Start: 2024-06-17

## 2024-06-17 NOTE — TELEPHONE ENCOUNTER
Medication is being filled for 1 time refill only due to:  Patient needs labs A1C. Patient needs to be seen because due for apt around 6/18/24.    Jazmin Aquino RN, BSN  [facility]

## 2024-07-06 ENCOUNTER — HEALTH MAINTENANCE LETTER (OUTPATIENT)
Age: 74
End: 2024-07-06

## 2024-07-28 ENCOUNTER — PATIENT OUTREACH (OUTPATIENT)
Dept: CARE COORDINATION | Facility: CLINIC | Age: 74
End: 2024-07-28
Payer: MEDICARE

## 2024-08-20 ENCOUNTER — APPOINTMENT (OUTPATIENT)
Dept: CT IMAGING | Facility: HOSPITAL | Age: 74
End: 2024-08-20
Attending: EMERGENCY MEDICINE
Payer: MEDICARE

## 2024-08-20 ENCOUNTER — HOSPITAL ENCOUNTER (EMERGENCY)
Facility: HOSPITAL | Age: 74
Discharge: HOME | End: 2024-08-20
Attending: EMERGENCY MEDICINE | Admitting: EMERGENCY MEDICINE
Payer: MEDICARE

## 2024-08-20 VITALS
HEART RATE: 58 BPM | DIASTOLIC BLOOD PRESSURE: 100 MMHG | OXYGEN SATURATION: 100 % | TEMPERATURE: 98 F | SYSTOLIC BLOOD PRESSURE: 182 MMHG | RESPIRATION RATE: 25 BRPM

## 2024-08-20 DIAGNOSIS — R07.9 CHEST PAIN, UNSPECIFIED TYPE: ICD-10-CM

## 2024-08-20 DIAGNOSIS — R03.0 ELEVATED BLOOD PRESSURE READING: ICD-10-CM

## 2024-08-20 LAB
ANION GAP SERPL CALCULATED.3IONS-SCNC: 12 MMOL/L (ref 7–15)
BUN SERPL-MCNC: 35.6 MG/DL (ref 8–23)
CALCIUM SERPL-MCNC: 9.2 MG/DL (ref 8.8–10.4)
CHLORIDE SERPL-SCNC: 103 MMOL/L (ref 98–107)
CREAT SERPL-MCNC: 1.05 MG/DL (ref 0.51–0.95)
EGFRCR SERPLBLD CKD-EPI 2021: 56 ML/MIN/1.73M2
ERYTHROCYTE [DISTWIDTH] IN BLOOD BY AUTOMATED COUNT: 14.2 % (ref 10–15)
GLUCOSE SERPL-MCNC: 111 MG/DL (ref 70–99)
HCO3 SERPL-SCNC: 25 MMOL/L (ref 22–29)
HCT VFR BLD AUTO: 36.1 % (ref 35–47)
HGB BLD-MCNC: 11.5 G/DL (ref 11.7–15.7)
HOLD SPECIMEN: NORMAL
INR PPP: 0.92 (ref 0.85–1.15)
MAGNESIUM SERPL-MCNC: 1.7 MG/DL (ref 1.7–2.3)
MCH RBC QN AUTO: 28.9 PG (ref 26.5–33)
MCHC RBC AUTO-ENTMCNC: 31.9 G/DL (ref 31.5–36.5)
MCV RBC AUTO: 91 FL (ref 78–100)
PLATELET # BLD AUTO: 329 10E3/UL (ref 150–450)
POTASSIUM SERPL-SCNC: 4.8 MMOL/L (ref 3.4–5.3)
RBC # BLD AUTO: 3.98 10E6/UL (ref 3.8–5.2)
SODIUM SERPL-SCNC: 140 MMOL/L (ref 135–145)
TROPONIN T SERPL HS-MCNC: 14 NG/L
TROPONIN T SERPL HS-MCNC: 17 NG/L
WBC # BLD AUTO: 11.9 10E3/UL (ref 4–11)

## 2024-08-20 PROCEDURE — 93005 ELECTROCARDIOGRAM TRACING: CPT | Performed by: EMERGENCY MEDICINE

## 2024-08-20 PROCEDURE — 99285 EMERGENCY DEPT VISIT HI MDM: CPT | Mod: 25

## 2024-08-20 PROCEDURE — 80048 BASIC METABOLIC PNL TOTAL CA: CPT | Performed by: EMERGENCY MEDICINE

## 2024-08-20 PROCEDURE — 250N000011 HC RX IP 250 OP 636: Performed by: EMERGENCY MEDICINE

## 2024-08-20 PROCEDURE — 74174 CTA ABD&PLVS W/CONTRAST: CPT | Mod: MG

## 2024-08-20 PROCEDURE — 85610 PROTHROMBIN TIME: CPT | Performed by: EMERGENCY MEDICINE

## 2024-08-20 PROCEDURE — 250N000013 HC RX MED GY IP 250 OP 250 PS 637: Performed by: EMERGENCY MEDICINE

## 2024-08-20 PROCEDURE — 36415 COLL VENOUS BLD VENIPUNCTURE: CPT | Performed by: EMERGENCY MEDICINE

## 2024-08-20 PROCEDURE — 85027 COMPLETE CBC AUTOMATED: CPT | Performed by: EMERGENCY MEDICINE

## 2024-08-20 PROCEDURE — 36415 COLL VENOUS BLD VENIPUNCTURE: CPT | Performed by: STUDENT IN AN ORGANIZED HEALTH CARE EDUCATION/TRAINING PROGRAM

## 2024-08-20 PROCEDURE — 84484 ASSAY OF TROPONIN QUANT: CPT | Performed by: EMERGENCY MEDICINE

## 2024-08-20 PROCEDURE — 83735 ASSAY OF MAGNESIUM: CPT | Performed by: EMERGENCY MEDICINE

## 2024-08-20 RX ORDER — NITROGLYCERIN 0.4 MG/1
0.4 TABLET SUBLINGUAL EVERY 5 MIN PRN
Status: DISCONTINUED | OUTPATIENT
Start: 2024-08-20 | End: 2024-08-20 | Stop reason: HOSPADM

## 2024-08-20 RX ORDER — IOPAMIDOL 755 MG/ML
70 INJECTION, SOLUTION INTRAVASCULAR ONCE
Status: COMPLETED | OUTPATIENT
Start: 2024-08-20 | End: 2024-08-20

## 2024-08-20 RX ADMIN — NITROGLYCERIN 0.4 MG: 0.4 TABLET, ORALLY DISINTEGRATING SUBLINGUAL at 02:19

## 2024-08-20 RX ADMIN — IOPAMIDOL 70 ML: 755 INJECTION, SOLUTION INTRAVENOUS at 02:28

## 2024-08-20 ASSESSMENT — COLUMBIA-SUICIDE SEVERITY RATING SCALE - C-SSRS
6. HAVE YOU EVER DONE ANYTHING, STARTED TO DO ANYTHING, OR PREPARED TO DO ANYTHING TO END YOUR LIFE?: NO
1. IN THE PAST MONTH, HAVE YOU WISHED YOU WERE DEAD OR WISHED YOU COULD GO TO SLEEP AND NOT WAKE UP?: NO
2. HAVE YOU ACTUALLY HAD ANY THOUGHTS OF KILLING YOURSELF IN THE PAST MONTH?: NO

## 2024-08-20 ASSESSMENT — ACTIVITIES OF DAILY LIVING (ADL)
ADLS_ACUITY_SCORE: 35

## 2024-08-20 NOTE — ED PROVIDER NOTES
EMERGENCY DEPARTMENT ENCOUNTER      NAME: Amalia Harris  AGE: 74 year old female  YOB: 1950  MRN: [id number]  EVALUATION DATE & TIME: 8/20/2024  1:15 AM    ED PROVIDER: Idania Celis MD    Chief Complaint   Patient presents with    Chest Pain       FINAL IMPRESSION  1. Chest pain, unspecified type    2. Elevated blood pressure reading        MEDICAL DECISION MAKING   Pertinent Labs & Imaging studies reviewed. (See chart for details)    Amalia Harris is a 74 year old female who presents for evaluation of chest pain.  Outside records reviewed.  Patient has a history of coronary artery disease, type 2 diabetes, LVOT upper lower extremity strength, hypertrophic cardiomyopathy, dyslipidemia.  She follows with cardiology and was most recently seen in clinic on 5/1/2024.  At that time, there was no plan to change any of her management.  She presents today with complaints of midsternal chest pain, nausea, clammy feeling, and dyspnea that began about 3 hours prior to arrival.  She was feeling well earlier in the day.  No recent cough, fever, abdominal pain, vomiting, or other new symptoms or concerns.  She has been taking all of her medications as prescribed.     I considered a broad differential including but not limited to ACS/ischemia, unstable angina, CHF, pericarditis, myocarditis, pericardial effusion, PE, viral illness, pneumonia, aortic dissection, pneumothorax, costochondritis, pleurisy, GERD, muscular strain/sprain, anxiety, anemia. No hypoxia, pleuritic pain, tachypnea, or other 2/2 to suggest PE.  Discussed options for workup and management with patient and her significant other.  We have cleared him for any for labs, EKG, CTA chest/abdomen/pelvis, and management of symptoms with aspirin and trial of nitro  As well as IV analgesic/antiemetic.    BMP with creatinine of 1.05, slightly increased from baseline except for mild HAILEY.  No other electrolyte derangement or acidosis.  CBC reassuring. No evidence of  leukocytosis to suggest systemic infectious/inflammatory process. No acute anemia. PLTs wnl.  Initial troponin 17 but EKG without acute ischemic changes.  Given timing, will plan to repeat at 2 hours.  CTA chest/abdomen/pelvis showed no evidence of dissection but did reveal mild cardiomegaly and diverticulosis.    I rechecked the patient multiple times.  She did have elevated blood pressure readings but reported resolution of pain after a single dose of nitro and remained very comfortable here.  Repeat troponin was 14 with this, do have low suspicion for ACS/ischemia.  Patient felt very reassured by results today and was eager to go home.  She was comfortable with plan to follow-up with cardiology team on an outpatient basis.  Recommended that she call the clinic today to see whether I want to see her for follow-up and in the meantime, continue to take all of her medications as prescribed.     At the end of the encounter, we reviewed the results, potential diagnoses, as well as return precautions and recommendations for follow up. I instructed Ms. Harris to return to the emergency department immediately if she develops any new or worsening symptoms and provided additional verbal discharge instructions. Ms. Harris expressed understanding and agreement with this plan of care, her questions were answered, and she was discharged in stable condition.      Considerations in Medical Decision Making  History:  Supplemental history from: Documented in chart and Family Member/Significant Other  External Record(s) reviewed: Documented in chart    Work Up:  Chart documentation includes differential considered and any EKGs or imaging independently interpreted by provider, where specified.  In additional to work up documented, I considered the following work up: Documented in chart, if applicable.    External consultation:  Discussion of management with another provider: Documented in chart, if applicable    Complicating factors:  Care  impacted by chronic illness: Heart Disease, Hyperlipidemia, and Hypertension  Care affected by social determinants of health: Access to Medical Care    Disposition considerations: Discharge. I recommended the patient continue their current prescription strength medication(s): DAPT and all home medications. I considered admission, but discharged the patient after share decision making conversation.      ED COURSE  1:25 AM I met with the patient, obtained history, performed an initial exam, and discussed options and plan for diagnostics and treatment here in the ED.   3:40 AM I updated the patient on their results.  4:39 AM I discussed discharge and the patient is agreeable. Reviewed supportive cares, symptomatic treatment, outpatient follow up, and reasons to return to the Emergency Department. All questions and concerns were addressed. Patient to be discharged by ED RN.         MEDICATIONS GIVEN IN THE ED  Medications   iopamidol (ISOVUE-370) solution 70 mL (70 mLs Intravenous $Given 8/20/24 0228)       NEW PRESCRIPTIONS STARTED AT TODAY'S VISIT  Discharge Medication List as of 8/20/2024  4:34 AM             =================================================================    HPI:    Patient information was obtained from: The patient and her son    Use of : christie  provided by her son      Amalia Harris is a 74 year old female who presents with chest pain.    The patient reports she developed irregular palpitations, accompanied with left-sided chest pain approximately 3 hours prior to arrival as she was at home relaxing. She does feel short of breath as well. She endorses numbness and coldness to her legs, but notes they are improving at this time. She is concerned her symptoms may be related to either a heart attack or a complication with her cardiac stent. She did have irregular palpitation during the time she needed the stent placed, but notes she did not have chest pain. She has been compliant in  taking her medications. No recent travels. No recent exposure to any viral illness.    No complaints of nausea, vomiting, abdominal pain, or any other associated symptoms at this time.    Per chart review,    The patient had a follow-up visit with cardiology on 08/20/2024.     CAD, recent evaluation of dyspnea with stress MRI showed abnormal perfusion. Recent angiogram showed significant LAD and first diagonal disease status post drug-eluting stent x2. Continue with aspirin and lovastatin 40 mg daily.    Hypertrophic cardiomypathy with LVOT outflow tract obsturction. She is now on mavacamten 2.5 mg daily.     MRI 3/26/2024:  1.  Pharmacologic regadenoson stress cardiac MRI is abnormal. There is a medium-sized area of inducible myocardial ischemia in the anteroseptal wall of the left ventricle suggestive of obstructive coronary artery disease in the territory of the left anterior descending artery. Alternatively, this could be due to microvascular ischemia from hypertrophied myocardium (as discussed below). 2.  No evidence of prior myocardial infarction, focal nonvascular myocardial fibrosis or infiltrative disease.  3.  Severe asymmetric hypertrophy of the basal anteroseptal wall of the left ventricle with a maximum wall thickness of 17 mm. There is evidence of left ventricular outflow tract acceleration but no obstruction or systolic anterior motion of the mitral apparatus with a peak resting left ventricular outflow gradient of 8 mmHg. Left ventricular cavity size is small and systolic function is hyperdynamic with a quantified left ventricular ejection fraction is 80%.  4.  Right ventricular chamber size and systolic function are normal.  The quantified right ventricular ejection fraction is 68%. 5.  Moderate left atrial enlargement.  6.  Severe mitral annular calcification and mild mitral leaflet thickening. Visually there may be mild mitral stenosis and valve area by planimetry is 1.8 cm^2. There is mild  central mitral regurgitation likely due to fibrodegenerative changes of the mitral apparatus.  7.  Mild enlargement of the main pulmonary artery which can be seen in pulmonary hypertension.     Coronary Angiogram 4/11/2024:    Prox LAD lesion is 70% stenosed.    1st Diag lesion is 95% stenosed.    Prox RCA lesion is 20% stenosed.    Dist RCA lesion is 30% stenosed.     1.  Moderately severe 70% eccentric proximal LAD plaque, appears ruptured, at takeoff of first diagonal branch.  Diagonal branch has 90% ostial stenosis.  Immediately distal to this segment the LAD has a segment of systolic bridging.  The remainder of the LAD appears normal.  2.  Left circumflex appears normal.  3.  RCA has mild luminal irregularity.  No significant stenoses.  4.  Successful PCI LAD/D1 with crush bifurcation stent technique: 2.5 x 15 Audie drug-eluting stent in diagonal and 3.5 x 15 [place] drug-eluting stent in LAD.  Post PCI 0% residual both limbs with KENNY-3 flow.  5.  /24; Ao 164/78 on pullback; mean gradient across the LV outflow tract and aortic valve 19 mmHg (patient sedated and sleeping during measurement)  RELEVANT HISTORY, MEDICATIONS, & ALLERGIES   Past medical history, surgical history, family history, medications, and allergies reviewed and pertinent noted in HPI.    REVIEW OF SYSTEMS:  A complete review of systems was performed with pertinent positives and negatives noted in the HPI. All other systems negative.     PHYSICAL EXAM:    Vitals: BP (!) 182/100   Pulse 58   Temp 98  F (36.7  C) (Oral)   Resp 25   SpO2 100%    General: Alert and interactive, comfortable appearing.  HENT: Atraumatic. Full AROM of neck. MMM.  Cardiovascular: Regular rate and rhythm.   Chest/Pulmonary: Normal work of breathing. Speaking in complete sentences. Lungs CTAB. No chest wall tenderness or deformities.  Abdomen: Soft, nondistended. Nontender without guarding or rebound.  Extremities: Normal AROM of all major joints.  Skin: Warm and  dry. Normal skin color.   Neuro: Speech clear. CNs grossly intact. Moves all extremities spontaneously.   Psych: Normal affect/mood, cooperative, memory appropriate.      LAB  Labs Ordered and Resulted from Time of ED Arrival to Time of ED Departure   TROPONIN T, HIGH SENSITIVITY - Abnormal       Result Value    Troponin T, High Sensitivity 17 (*)    BASIC METABOLIC PANEL - Abnormal    Sodium 140      Potassium 4.8      Chloride 103      Carbon Dioxide (CO2) 25      Anion Gap 12      Urea Nitrogen 35.6 (*)     Creatinine 1.05 (*)     GFR Estimate 56 (*)     Calcium 9.2      Glucose 111 (*)    CBC WITH PLATELETS - Abnormal    WBC Count 11.9 (*)     RBC Count 3.98      Hemoglobin 11.5 (*)     Hematocrit 36.1      MCV 91      MCH 28.9      MCHC 31.9      RDW 14.2      Platelet Count 329     MAGNESIUM - Normal    Magnesium 1.7     INR - Normal    INR 0.92     TROPONIN T, HIGH SENSITIVITY - Normal    Troponin T, High Sensitivity 14         RADIOLOGY  CTA Chest Abdomen Pelvis w Contrast   Final Result   IMPRESSION:    1.  No evidence of thoracoabdominal aortic aneurysm or dissection.   2.  Mild cardiomegaly and extensive atherosclerotic vascular calcification of the coronary arteries.   3.  Colonic diverticulosis without CT evidence of acute diverticulitis.   4.  A 1-cm thyroid nodule in the isthmus, can be further evaluated with thyroid ultrasound if clinically warranted.          EKG  Performed at: 08/20/2024 at 01:21  Impression: Sinus rhythm.  No acute ischemic changes.  Normal intervals.  Rate: 67 bpm   Rhythm: Sinus rhythm  QRS Interval: 86 ms  QTc Interval: 469 ms  Comparison: Vent. Rate has increased by 22 bpm and criteria for septal infarct are no longer present when compared with ECG of 04/11/2024 at 16:03.    All laboratory and imaging results and EKG's were personally reviewed and interpreted by myself prior to disposition decision.           Kermit SOUZA, am serving as a scribe to document services  personally performed by Dr. Idania Celis based on my observation and the provider's statements to me. I, Idania Celis MD attest that Kermityenifer Conner is acting in a scribe capacity, has observed my performance of the services and has documented them in accordance with my direction.    Idania Celis M.D.  Emergency Medicine  [facility] EMERGENCY DEPARTMENT  [address] [place] [id number] [phone]  Dept: [phone]     Idania Celis MD  08/20/24 0754

## 2024-08-20 NOTE — DISCHARGE INSTRUCTIONS
You were seen in the emergency department for chest pain.     Please return to the Emergency Department immediately if you experience chest pain, difficulty breathing, and/or if your symptoms get worse, do not improve, or with any new concerns. Otherwise, please follow up with your cardiologist within the next 2-3 days for recheck and ongoing management.    Below is some information that you might find informative and useful.     Thank you for choosing [facility]. It was a pleasure taking care of you today!  -Dr. Idania Celis

## 2024-08-20 NOTE — ED TRIAGE NOTES
The patient reports chest pressure and palpitations that started 2 hours ago. She reports that she was sitting and drinking water when the tightness started. She also reports numbness to her face.

## 2024-08-21 LAB
ATRIAL RATE - MUSE: 67 BPM
DIASTOLIC BLOOD PRESSURE - MUSE: 79 MMHG
INTERPRETATION ECG - MUSE: NORMAL
P AXIS - MUSE: 51 DEGREES
PR INTERVAL - MUSE: 166 MS
QRS DURATION - MUSE: 86 MS
QT - MUSE: 444 MS
QTC - MUSE: 469 MS
R AXIS - MUSE: 27 DEGREES
SYSTOLIC BLOOD PRESSURE - MUSE: 143 MMHG
T AXIS - MUSE: 70 DEGREES
VENTRICULAR RATE- MUSE: 67 BPM

## 2024-09-13 DIAGNOSIS — R80.9 TYPE 2 DIABETES MELLITUS WITH MICROALBUMINURIA, WITHOUT LONG-TERM CURRENT USE OF INSULIN (H): ICD-10-CM

## 2024-09-13 DIAGNOSIS — E11.29 TYPE 2 DIABETES MELLITUS WITH MICROALBUMINURIA, WITHOUT LONG-TERM CURRENT USE OF INSULIN (H): ICD-10-CM

## 2024-09-23 RX ORDER — METFORMIN HCL 500 MG
TABLET, EXTENDED RELEASE 24 HR ORAL
Qty: 360 TABLET | Refills: 0 | OUTPATIENT
Start: 2024-09-23

## 2024-11-04 ENCOUNTER — OFFICE VISIT (OUTPATIENT)
Dept: FAMILY MEDICINE | Facility: CLINIC | Age: 74
End: 2024-11-04
Payer: MEDICARE

## 2024-11-04 VITALS
SYSTOLIC BLOOD PRESSURE: 127 MMHG | OXYGEN SATURATION: 96 % | WEIGHT: 172 LBS | HEIGHT: 57 IN | HEART RATE: 91 BPM | DIASTOLIC BLOOD PRESSURE: 77 MMHG | RESPIRATION RATE: 15 BRPM | TEMPERATURE: 97.3 F | BODY MASS INDEX: 37.11 KG/M2

## 2024-11-04 DIAGNOSIS — E78.5 HYPERLIPIDEMIA LDL GOAL <100: ICD-10-CM

## 2024-11-04 DIAGNOSIS — I11.0 BENIGN HYPERTENSIVE HEART DISEASE WITH CONGESTIVE HEART FAILURE (H): ICD-10-CM

## 2024-11-04 DIAGNOSIS — Z23 NEED FOR VACCINATION: ICD-10-CM

## 2024-11-04 DIAGNOSIS — Q24.8 LEFT VENTRICULAR OUTFLOW TRACT OBSTRUCTION: ICD-10-CM

## 2024-11-04 DIAGNOSIS — I25.10 CORONARY ARTERY DISEASE INVOLVING NATIVE CORONARY ARTERY OF NATIVE HEART WITHOUT ANGINA PECTORIS: ICD-10-CM

## 2024-11-04 DIAGNOSIS — Z23 NEED FOR SHINGLES VACCINE: ICD-10-CM

## 2024-11-04 DIAGNOSIS — R80.9 TYPE 2 DIABETES MELLITUS WITH MICROALBUMINURIA, WITHOUT LONG-TERM CURRENT USE OF INSULIN (H): Primary | ICD-10-CM

## 2024-11-04 DIAGNOSIS — R82.991 HYPOCITRATURIA: ICD-10-CM

## 2024-11-04 DIAGNOSIS — Z12.31 BREAST CANCER SCREENING BY MAMMOGRAM: ICD-10-CM

## 2024-11-04 DIAGNOSIS — E11.29 TYPE 2 DIABETES MELLITUS WITH MICROALBUMINURIA, WITHOUT LONG-TERM CURRENT USE OF INSULIN (H): Primary | ICD-10-CM

## 2024-11-04 DIAGNOSIS — Z29.11 NEED FOR VACCINATION AGAINST RESPIRATORY SYNCYTIAL VIRUS: ICD-10-CM

## 2024-11-04 LAB
EST. AVERAGE GLUCOSE BLD GHB EST-MCNC: 131 MG/DL
HBA1C MFR BLD: 6.2 % (ref 0–5.6)

## 2024-11-04 PROCEDURE — 91320 SARSCV2 VAC 30MCG TRS-SUC IM: CPT | Performed by: FAMILY MEDICINE

## 2024-11-04 PROCEDURE — 99214 OFFICE O/P EST MOD 30 MIN: CPT | Mod: 25 | Performed by: FAMILY MEDICINE

## 2024-11-04 PROCEDURE — G0008 ADMIN INFLUENZA VIRUS VAC: HCPCS | Performed by: FAMILY MEDICINE

## 2024-11-04 PROCEDURE — 82043 UR ALBUMIN QUANTITATIVE: CPT | Performed by: FAMILY MEDICINE

## 2024-11-04 PROCEDURE — 80061 LIPID PANEL: CPT | Performed by: FAMILY MEDICINE

## 2024-11-04 PROCEDURE — 82565 ASSAY OF CREATININE: CPT | Performed by: FAMILY MEDICINE

## 2024-11-04 PROCEDURE — 90480 ADMN SARSCOV2 VAC 1/ONLY CMP: CPT | Performed by: FAMILY MEDICINE

## 2024-11-04 PROCEDURE — 84460 ALANINE AMINO (ALT) (SGPT): CPT | Performed by: FAMILY MEDICINE

## 2024-11-04 PROCEDURE — 83036 HEMOGLOBIN GLYCOSYLATED A1C: CPT | Performed by: FAMILY MEDICINE

## 2024-11-04 PROCEDURE — 90662 IIV NO PRSV INCREASED AG IM: CPT | Performed by: FAMILY MEDICINE

## 2024-11-04 PROCEDURE — 36415 COLL VENOUS BLD VENIPUNCTURE: CPT | Performed by: FAMILY MEDICINE

## 2024-11-04 PROCEDURE — 82570 ASSAY OF URINE CREATININE: CPT | Performed by: FAMILY MEDICINE

## 2024-11-04 RX ORDER — LOVASTATIN 40 MG/1
40 TABLET ORAL EVERY EVENING
Qty: 90 TABLET | Refills: 3 | Status: SHIPPED | OUTPATIENT
Start: 2024-11-04

## 2024-11-04 RX ORDER — POTASSIUM CITRATE 10 MEQ/1
30 TABLET, EXTENDED RELEASE ORAL 2 TIMES DAILY
Qty: 540 TABLET | Refills: 3 | Status: SHIPPED | OUTPATIENT
Start: 2024-11-04

## 2024-11-04 RX ORDER — METFORMIN HYDROCHLORIDE 500 MG/1
1000 TABLET, EXTENDED RELEASE ORAL 2 TIMES DAILY WITH MEALS
Qty: 360 TABLET | Refills: 0 | Status: SHIPPED | OUTPATIENT
Start: 2024-11-04

## 2024-11-04 ASSESSMENT — PATIENT HEALTH QUESTIONNAIRE - PHQ9
SUM OF ALL RESPONSES TO PHQ QUESTIONS 1-9: 2
SUM OF ALL RESPONSES TO PHQ QUESTIONS 1-9: 2
10. IF YOU CHECKED OFF ANY PROBLEMS, HOW DIFFICULT HAVE THESE PROBLEMS MADE IT FOR YOU TO DO YOUR WORK, TAKE CARE OF THINGS AT HOME, OR GET ALONG WITH OTHER PEOPLE: NOT DIFFICULT AT ALL

## 2024-11-04 ASSESSMENT — PAIN SCALES - GENERAL: PAINLEVEL_OUTOF10: NO PAIN (0)

## 2024-11-04 NOTE — PROGRESS NOTES
Assessment & Plan     (E11.29,  R80.9) Type 2 diabetes mellitus with microalbuminuria, without long-term current use of insulin (H)  (primary encounter diagnosis)  Comment: well controlled   Plan: metFORMIN (GLUCOPHAGE XR) 500 MG 24 hr tablet,         lovastatin (MEVACOR) 40 MG tablet        Return in about 6 months (around 5/4/2025) for Medicare annual wellness exam, diabetes.      (E78.5) Hyperlipidemia LDL goal <100  (I25.10) Coronary artery disease involving native coronary artery of native heart without angina pectoris  Comment: LDL historically at goal. She is on a moderate-intensity statin and had a RICHARD placed 4/11/24.   Plan: lovastatin (MEVACOR) 40 MG tablet        Recheck lipids in 6-12 months. Continue on anti-platelet medications per Cardiology.    (Q24.8) Left ventricular outflow tract obstruction  (I11.0) Benign hypertensive heart disease with congestive heart failure (H)  Comment: She was due for a follow up with her Cardiologist in June, and they had referred her to cardiac rehab. The patient declined these, as she has a limited income, still has some medical bills to pay, and is uncertain about what further OOP expenses she would incur.  Plan: I provided her with contact info for when she decides she can go ahead with those appointments.    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  Plan: MA Screening Bilateral w/ Edi        I advised her that I think it is unlikely that there is an OOP  expense to have a mammo done.          Angel Holland is a 74 year old, presenting for the following health issues:  Hypertension, Lipids, and Diabetes        11/4/2024     2:39 PM   Additional Questions   Roomed by Vicki   Accompanied by self     History of Present Illness       Diabetes:   She presents for follow up of diabetes.  She is checking home blood glucose two times daily.   She checks blood glucose before meals and at bedtime.  Blood glucose is never over 200 and never under 70. She is aware of hypoglycemia symptoms including shakiness.    She has no concerns regarding her diabetes at this time.   She is not experiencing numbness or burning in feet, excessive thirst, blurry vision, weight changes or redness, sores or blisters on feet.           Hyperlipidemia:  She presents for follow up of hyperlipidemia.   She is taking medication to lower cholesterol. She is not having myalgia or other side effects to statin medications.    Hypertension: She presents for follow up of hypertension.  She does check blood pressure  regularly outside of the clinic. Outside blood pressures have been over 140/90. She follows a low salt diet.     She eats 2-3 servings of fruits and vegetables daily.She consumes 0 sweetened beverage(s) daily.She exercises with enough effort to increase her heart rate 9 or less minutes per day.  She exercises with enough effort to increase her heart rate 3 or less days per week.   She is taking medications regularly.     PATIENT DECLINED MEDICARE WELLNESS AT THIS TIME    Patient reports that she checks her blood sugar sometimes 3/day if she isn't feeling as good. But when she is feeling energized and can walk well she only checks it twice a day. Her blood sugar is never over 130. Her typical blood pressure at home are in the 120s.                  Objective    /77 (BP Location: Left arm, Patient Position: Sitting, Cuff Size: Adult Regular)   Pulse 91   Temp 97.3  F (36.3  C)  "(Temporal)   Resp 15   Ht 1.448 m (4' 9.01\")   Wt 78 kg (172 lb)   SpO2 96%   BMI 37.21 kg/m    Body mass index is 37.21 kg/m .  Physical Exam   GENERAL: healthy, alert and no distress  EYES: Eyes grossly normal to inspection, PERRL, EOMI, sclerae white and conjunctivae normal  RESP: lungs clear to auscultation - no crackles or wheezes, no areas of dullness, no tachypnea  CV: Heart regular rate and rhythm without murmur, click or rub. No peripheral edema and peripheral pulses strong  MS: no gross musculoskeletal defects noted, no edema  SKIN: no suspicious lesions or rashes to visible skin  NEURO: Normal strength and tone, sensory exam grossly normal, mentation intact, oriented times 3 and cranial nerves 2-12 intact  PSYCH: mentation appears normal, affect normal/bright     Lab Results   Component Value Date    A1C 6.2 11/04/2024    A1C 6.6 10/30/2023    A1C 6.6 01/30/2023    A1C 6.1 07/25/2022    A1C 6.4 08/02/2021    A1C 6.4 11/30/2020    A1C 7.4 03/09/2020    A1C 6.3 09/05/2019    A1C 6.8 12/17/2018    A1C 6.4 05/25/2016         This document serves as a record of the services and decisions personally performed and made by Dr. Sanchez. It was created on his behalf by Kavitha Almonte, a trained medical scribe. The creation of this document is based the provider's statements to the medical scribe.  Kavitha Almonte, 4:55 PM         Signed Electronically by: Terry Sanchez MD    "

## 2024-11-04 NOTE — PATIENT INSTRUCTIONS
At [facility], we strive to deliver an exceptional experience to you, every time we see you. If you receive a survey, please let us know what we are doing well and/or what we could improve upon, as we do value your feedback.  If you have MyChart, you can expect to receive results automatically within 24 hours of their completion.  Your provider will send a note interpreting your results as well.   If you do not have MyChart, you should receive your results in about a week by mail.    Your care team:                            Family Medicine Internal Medicine   MD Gonzalo Palomino, MD Lien Dickinson, MD Albert Chauhan, MD Mariluz Ramos, PASajanC    Presley Perrin, MD Pediatrics   Hyun Woodall, MD Jesi Tompkins, MD Cata Ayala, APRN CNP Chelsea Parra APRN CNP   Pau Abraham, MD Kiersten Bell, MD Sylvie Crowe, CNP     Nikhil Gifford, CNP Same-Day Provider (No follow-up visits)   BRII Fall, DNP Cassie Ivory, BRII Horton, FNP, BC HEMALATHA BullC     Clinic hours: Monday - Thursday 7 am-6 pm; Fridays 7 am-5 pm.   Urgent care: Monday - Friday 10 am- 8 pm; Saturday and Sunday 9 am-5 pm.    Clinic: [phone]       [facility]: Monday - Thursday 8 am - 7 pm; Friday 8 am - 6 pm  [facility]: [phone]      To schedule a follow up with your cardiologist, Dr. Bentley, please call:    [phone]    If you have questions, concerns, or new concerning symptoms prior to  your next appointment, please contact the Cardiology Nursing team at  [phone].      Your cardiologist has referred you to Cardiac Rehab. Please call [phone]. To schedule with them.      Please call [facility]: [phone] ([place] or [place]) or [facility]: [phone] ([place], [facility]) to schedule your mammogram.

## 2024-11-05 ENCOUNTER — PATIENT OUTREACH (OUTPATIENT)
Dept: CARE COORDINATION | Facility: CLINIC | Age: 74
End: 2024-11-05
Payer: MEDICARE

## 2024-11-05 LAB
ALT SERPL W P-5'-P-CCNC: 20 U/L (ref 0–50)
CHOLEST SERPL-MCNC: 166 MG/DL
CREAT SERPL-MCNC: 0.79 MG/DL (ref 0.51–0.95)
CREAT UR-MCNC: 34.9 MG/DL
EGFRCR SERPLBLD CKD-EPI 2021: 79 ML/MIN/1.73M2
FASTING STATUS PATIENT QL REPORTED: NO
HDLC SERPL-MCNC: 57 MG/DL
LDLC SERPL CALC-MCNC: 89 MG/DL
MICROALBUMIN UR-MCNC: <12 MG/L
MICROALBUMIN/CREAT UR: NORMAL MG/G{CREAT}
NONHDLC SERPL-MCNC: 109 MG/DL
TRIGL SERPL-MCNC: 101 MG/DL

## 2024-11-05 NOTE — LETTER
[facility] CARE COORDINATION  [facility] [place] [address]    November 6, 2024    Amalia Harris  [address]  [facility] [address]      Dear Amalia,    I am a clinic community health worker who works with Terry Sanchez MD with the [facility]. I have been trying to reach you recently to introduce Clinic Care Coordination. Below is a description of clinic care coordination and how I can further assist you.       The clinic care coordination team is made up of a registered nurse, , financial resource worker and community health worker who understand the health care system. The goal of clinic care coordination is to help you manage your health and improve access to the health care system. Our team works alongside your provider to assist you in determining your health and social needs. We can help you obtain health care and community resources, providing you with necessary information and education. We can work with you through any barriers and develop a care plan that helps coordinate and strengthen the communication between you and your care team.  Our services are voluntary and are offered without charge to you personally.    Please feel free to contact me with any questions or concerns regarding care coordination and what we can offer.      We are focused on providing you with the highest-quality healthcare experience possible.    Sincerely,      Heather Anthony, JERMAINE  [place], [place], [place], [place] and [facility]  [phone]         John Cowarty,    Kuv yog ib tugtxhim tsa eva saib xyuas ntawm qhov chaw bert mob uas ua hauj lwm nrog Dr Sanchez albino [facility].Kuv abby ntawv tuaj qhia koj paub txog peb txoj darshan num uas yog txhim tsa eva saib xyuas [place] hauv qhov chaw bert mob thiab peb tseem pab txhawb nqa koj tau kom ncav cuag koj alfred nitin phiaj ntawm eva bert mob.    Peb pab pawg txhim tsa eva saib xyuas [place]ilton qhov chaw bert mob  muaj neeg xws li ib tug nais maum txawj, ib tug kws pab pej xeem, thiab ib tug neeg bert mob hauv zej zog. Lub nitin phiaj hamilton peb pab pawg no yog pab koj noj qab nyob zoo thiab pab kom thiaj yooj zoë [place] koj txais tau eva bert mob. Peb pab pawg yuav pab koj ncav cuag koj alfred nitin phiaj ntawm eva bert mob dhau ntawm muab ntaub ntawv ntawm eva noj qab nyob zoo hamilton koj, txhim tsa eva pab, ntxiv zog hamilton eva tiv tauj ntawm koj thiab koj tus kws bert mob, thiab txhawb nqa koj yog koj yuav tsum muaj dab tsi.    Thov tiv tauj tuaj hamilton Heather dang [phone]  yog koj muaj duarte nug los sis eva txhawj xeeb dab tsi. Peb ua tib zoo siv zog muab eva bert mob zoo tshaj plaws hamilton koj. Peb xav pab koj ncav cuag thiab ua neej nyob raws li koj alfred nitin phiaj ntawm eva bert mob.    Jennifer npe,

## 2024-11-05 NOTE — PROGRESS NOTES
[facility]/Voicemail    Clinical Data: Care Coordinator Outreach    Outreach Documentation Number of Outreach Attempt   11/5/2024   2:47 PM 1       Left message on patient's voicemail with call back information and requested return call.      Plan:   Care Coordinator will try to reach patient again in 1-2 business days.    JERMAINE Chavez, [place], [place] and [facility]  [phone]

## 2024-11-06 NOTE — PROGRESS NOTES
[facility]/Voicemail    Clinical Data: Care Coordinator Outreach    Outreach Documentation Number of Outreach Attempt   11/5/2024   2:47 PM 1   11/6/2024   2:20 PM 2       Left message on patient's voicemail with call back information and requested return call.      Plan: Care Coordinator will send care coordination introduction letter with care coordinator contact information and explanation of care coordination services via [employer]. Care Coordinator will do no further outreaches at this time.    JERMAINE Chavez, Amy Covington, [facility]  [phone]

## 2024-11-08 ENCOUNTER — APPOINTMENT (OUTPATIENT)
Dept: INTERPRETER SERVICES | Facility: CLINIC | Age: 74
End: 2024-11-08
Payer: MEDICARE

## 2025-05-11 ENCOUNTER — HEALTH MAINTENANCE LETTER (OUTPATIENT)
Age: 75
End: 2025-05-11

## 2025-09-02 DIAGNOSIS — R80.9 TYPE 2 DIABETES MELLITUS WITH MICROALBUMINURIA, WITHOUT LONG-TERM CURRENT USE OF INSULIN (H): ICD-10-CM

## 2025-09-02 DIAGNOSIS — E11.29 TYPE 2 DIABETES MELLITUS WITH MICROALBUMINURIA, WITHOUT LONG-TERM CURRENT USE OF INSULIN (H): ICD-10-CM

## 2025-09-04 RX ORDER — METFORMIN HYDROCHLORIDE 500 MG/1
TABLET, EXTENDED RELEASE ORAL
Qty: 120 TABLET | Refills: 0 | Status: SHIPPED | OUTPATIENT
Start: 2025-09-04

## (undated) DEVICE — SOL WATER IRRIG 1000ML BOTTLE 07139-09

## (undated) DEVICE — CATH DIAGNOSTIC RADIAL 5FR TIG 4.0

## (undated) DEVICE — CATH BALLOON EMERGE 2.0X12MM H7493918912200

## (undated) DEVICE — SU ETHILON 10-0 TG140-6 12" 9003G

## (undated) DEVICE — VALVE HEMOSTASIS .096" COPILOT MECH 1003331

## (undated) DEVICE — ELECTRODE DEFIB CADENCE 22550R

## (undated) DEVICE — GLOVE PROTEXIS W/NEU-THERA 7.0  2D73TE70

## (undated) DEVICE — INTRO MICRO MINI STICK 4FR STIFF NITINOL 45-753

## (undated) DEVICE — SHTH INTRO 0.021IN ID 6FR DIA

## (undated) DEVICE — CATH RX TAKERU PTCA BALLOON 1.5X6MM DC-RY1506UA1

## (undated) DEVICE — PREP POVIDONE IODINE SWABS X3

## (undated) DEVICE — INTRO SHEATH 6FRX10CM PINNACLE RSS602

## (undated) DEVICE — DEVICE INFLATION SYR W/ HEMOSTASIS VALVE 12IN EXT IN4904

## (undated) DEVICE — CATH ANGIO INFINITI JL5 4FRX100CM 538422

## (undated) DEVICE — EYE PACK CUSTOM CATARACT AS12127-01

## (undated) DEVICE — CATH BALLOON NC EMERGE 2.00X12MM H7493926712200

## (undated) DEVICE — CUSTOM PACK CORONARY SAN5BCRHEA

## (undated) DEVICE — PREP CHLORAPREP 26ML TINTED ORANGE  260815

## (undated) DEVICE — CATH ANGIO INFINITI 3DRC 4FRX100CM 538476

## (undated) DEVICE — MANIFOLD KIT ANGIO AUTOMATED 014613

## (undated) DEVICE — KIT HAND CONTROL ACIST 014644 AR-P54

## (undated) DEVICE — CATH DIAG 4FR ANG PIG 538453S

## (undated) DEVICE — INTRO SHEATH 4FRX10CM PINNACLE RSS402

## (undated) DEVICE — CATH BALLOON NC EMERGE 3.50X12MM H7493926712350

## (undated) DEVICE — GUIDEWIRE FORTE FLOPPY J TOP 34949-05J

## (undated) DEVICE — WIRE GUIDE HI-TRQ  WHISPER MS JTIP 0.014"X190CM 1005357HJ

## (undated) DEVICE — SYR ANGIOGRAPHY MULTIUSE KIT ACIST 014612

## (undated) DEVICE — CATH LAUNCHER 6FR EBU 3.5 LA6EBU35

## (undated) RX ORDER — TETRACAINE HYDROCHLORIDE 5 MG/ML
SOLUTION OPHTHALMIC
Status: DISPENSED
Start: 2019-11-25

## (undated) RX ORDER — MOXIFLOXACIN 5 MG/ML
SOLUTION/ DROPS OPHTHALMIC
Status: DISPENSED
Start: 2019-11-11

## (undated) RX ORDER — CLOPIDOGREL 300 MG/1
TABLET, FILM COATED ORAL
Status: DISPENSED
Start: 2024-04-11

## (undated) RX ORDER — TETRACAINE HYDROCHLORIDE 5 MG/ML
SOLUTION OPHTHALMIC
Status: DISPENSED
Start: 2019-11-11

## (undated) RX ORDER — LIDOCAINE HYDROCHLORIDE 20 MG/ML
INJECTION, SOLUTION EPIDURAL; INFILTRATION; INTRACAUDAL; PERINEURAL
Status: DISPENSED
Start: 2019-11-11

## (undated) RX ORDER — CYCLOPENTOLATE HYDROCHLORIDE 10 MG/ML
SOLUTION/ DROPS OPHTHALMIC
Status: DISPENSED
Start: 2019-11-11

## (undated) RX ORDER — FENTANYL CITRATE 50 UG/ML
INJECTION, SOLUTION INTRAMUSCULAR; INTRAVENOUS
Status: DISPENSED
Start: 2019-11-11

## (undated) RX ORDER — FENTANYL CITRATE 50 UG/ML
INJECTION, SOLUTION INTRAMUSCULAR; INTRAVENOUS
Status: DISPENSED
Start: 2019-11-25

## (undated) RX ORDER — FENTANYL CITRATE 50 UG/ML
INJECTION, SOLUTION INTRAMUSCULAR; INTRAVENOUS
Status: DISPENSED
Start: 2021-02-10

## (undated) RX ORDER — SIMETHICONE 40MG/0.6ML
SUSPENSION, DROPS(FINAL DOSAGE FORM)(ML) ORAL
Status: DISPENSED
Start: 2021-02-10

## (undated) RX ORDER — FENTANYL CITRATE 50 UG/ML
INJECTION, SOLUTION INTRAMUSCULAR; INTRAVENOUS
Status: DISPENSED
Start: 2024-04-11

## (undated) RX ORDER — LIDOCAINE HYDROCHLORIDE 20 MG/ML
INJECTION, SOLUTION EPIDURAL; INFILTRATION; INTRACAUDAL; PERINEURAL
Status: DISPENSED
Start: 2019-11-25

## (undated) RX ORDER — HYDRALAZINE HYDROCHLORIDE 20 MG/ML
INJECTION INTRAMUSCULAR; INTRAVENOUS
Status: DISPENSED
Start: 2024-04-11

## (undated) RX ORDER — EPINEPHRINE 1 MG/ML
INJECTION, SOLUTION, CONCENTRATE INTRAVENOUS
Status: DISPENSED
Start: 2019-11-25

## (undated) RX ORDER — TROPICAMIDE 10 MG/ML
SOLUTION/ DROPS OPHTHALMIC
Status: DISPENSED
Start: 2019-11-11

## (undated) RX ORDER — ONDANSETRON 2 MG/ML
INJECTION INTRAMUSCULAR; INTRAVENOUS
Status: DISPENSED
Start: 2024-04-11

## (undated) RX ORDER — ASPIRIN 81 MG/1
TABLET, CHEWABLE ORAL
Status: DISPENSED
Start: 2024-04-11

## (undated) RX ORDER — MOXIFLOXACIN 5 MG/ML
SOLUTION/ DROPS OPHTHALMIC
Status: DISPENSED
Start: 2019-11-25

## (undated) RX ORDER — BALANCED SALT SOLUTION 6.4; .75; .48; .3; 3.9; 1.7 MG/ML; MG/ML; MG/ML; MG/ML; MG/ML; MG/ML
SOLUTION OPHTHALMIC
Status: DISPENSED
Start: 2019-11-25

## (undated) RX ORDER — EPINEPHRINE 1 MG/ML
INJECTION, SOLUTION INTRAMUSCULAR; SUBCUTANEOUS
Status: DISPENSED
Start: 2019-11-11

## (undated) RX ORDER — DIAZEPAM 5 MG
TABLET ORAL
Status: DISPENSED
Start: 2024-04-11

## (undated) RX ORDER — BALANCED SALT SOLUTION 6.4; .75; .48; .3; 3.9; 1.7 MG/ML; MG/ML; MG/ML; MG/ML; MG/ML; MG/ML
SOLUTION OPHTHALMIC
Status: DISPENSED
Start: 2019-11-11

## (undated) RX ORDER — EPINEPHRINE 1 MG/ML
INJECTION, SOLUTION, CONCENTRATE INTRAVENOUS
Status: DISPENSED
Start: 2019-11-11